# Patient Record
Sex: MALE | Race: WHITE | HISPANIC OR LATINO | Employment: FULL TIME | URBAN - METROPOLITAN AREA
[De-identification: names, ages, dates, MRNs, and addresses within clinical notes are randomized per-mention and may not be internally consistent; named-entity substitution may affect disease eponyms.]

---

## 2018-03-12 ENCOUNTER — HOSPITAL ENCOUNTER (OUTPATIENT)
Facility: HOSPITAL | Age: 33
Setting detail: OBSERVATION
Discharge: HOME | End: 2018-03-12
Attending: EMERGENCY MEDICINE | Admitting: INTERNAL MEDICINE
Payer: COMMERCIAL

## 2018-03-12 ENCOUNTER — APPOINTMENT (EMERGENCY)
Dept: RADIOLOGY | Facility: HOSPITAL | Age: 33
End: 2018-03-12
Payer: COMMERCIAL

## 2018-03-12 VITALS
HEART RATE: 80 BPM | RESPIRATION RATE: 19 BRPM | DIASTOLIC BLOOD PRESSURE: 60 MMHG | WEIGHT: 165 LBS | SYSTOLIC BLOOD PRESSURE: 115 MMHG | BODY MASS INDEX: 24.44 KG/M2 | TEMPERATURE: 98.2 F | HEIGHT: 69 IN | OXYGEN SATURATION: 98 %

## 2018-03-12 DIAGNOSIS — R07.9 CHEST PAIN: Primary | ICD-10-CM

## 2018-03-12 DIAGNOSIS — F19.10 DRUG ABUSE (HCC): ICD-10-CM

## 2018-03-12 DIAGNOSIS — D62 ACUTE BLOOD LOSS ANEMIA: ICD-10-CM

## 2018-03-12 DIAGNOSIS — K92.1 MELENA: ICD-10-CM

## 2018-03-12 DIAGNOSIS — R79.89 ELEVATED SERUM CREATININE: ICD-10-CM

## 2018-03-12 DIAGNOSIS — R06.02 SHORTNESS OF BREATH: ICD-10-CM

## 2018-03-12 DIAGNOSIS — R94.31 ABNORMAL EKG: ICD-10-CM

## 2018-03-12 PROBLEM — F15.10 METHAMPHETAMINE ABUSE (HCC): Status: ACTIVE | Noted: 2018-03-12

## 2018-03-12 PROBLEM — F11.10 HEROIN ABUSE (HCC): Status: ACTIVE | Noted: 2018-03-12

## 2018-03-12 PROBLEM — F41.9 ANXIETY: Status: ACTIVE | Noted: 2018-03-12

## 2018-03-12 PROBLEM — N17.9 ACUTE KIDNEY INJURY (HCC): Status: ACTIVE | Noted: 2018-03-12

## 2018-03-12 LAB
ALBUMIN SERPL BCP-MCNC: 4.4 G/DL (ref 3.5–5)
ALP SERPL-CCNC: 83 U/L (ref 46–116)
ALT SERPL W P-5'-P-CCNC: 39 U/L (ref 12–78)
AMPHETAMINES SERPL QL SCN: POSITIVE
ANION GAP SERPL CALCULATED.3IONS-SCNC: 7 MMOL/L (ref 4–13)
AST SERPL W P-5'-P-CCNC: 40 U/L (ref 5–45)
ATRIAL RATE: 93 BPM
ATRIAL RATE: 97 BPM
BARBITURATES UR QL: NEGATIVE
BASOPHILS # BLD AUTO: 0.02 THOUSANDS/ΜL (ref 0–0.1)
BASOPHILS NFR BLD AUTO: 0 % (ref 0–1)
BENZODIAZ UR QL: POSITIVE
BILIRUB SERPL-MCNC: 1.04 MG/DL (ref 0.2–1)
BUN SERPL-MCNC: 13 MG/DL (ref 5–25)
CALCIUM SERPL-MCNC: 10 MG/DL (ref 8.3–10.1)
CHLORIDE SERPL-SCNC: 97 MMOL/L (ref 100–108)
CHOLEST SERPL-MCNC: 205 MG/DL (ref 50–200)
CO2 SERPL-SCNC: 32 MMOL/L (ref 21–32)
COCAINE UR QL: POSITIVE
CREAT SERPL-MCNC: 1.31 MG/DL (ref 0.6–1.3)
EOSINOPHIL # BLD AUTO: 0.08 THOUSAND/ΜL (ref 0–0.61)
EOSINOPHIL NFR BLD AUTO: 1 % (ref 0–6)
ERYTHROCYTE [DISTWIDTH] IN BLOOD BY AUTOMATED COUNT: 12.5 % (ref 11.6–15.1)
EST. AVERAGE GLUCOSE BLD GHB EST-MCNC: 105 MG/DL
GFR SERPL CREATININE-BSD FRML MDRD: 72 ML/MIN/1.73SQ M
GLUCOSE SERPL-MCNC: 87 MG/DL (ref 65–140)
HBA1C MFR BLD: 5.3 % (ref 4.2–6.3)
HCT VFR BLD AUTO: 40.8 % (ref 36.5–49.3)
HDLC SERPL-MCNC: 37 MG/DL (ref 40–60)
HGB BLD-MCNC: 14.2 G/DL (ref 12–17)
LDLC SERPL CALC-MCNC: 142 MG/DL (ref 0–100)
LYMPHOCYTES # BLD AUTO: 1.38 THOUSANDS/ΜL (ref 0.6–4.47)
LYMPHOCYTES NFR BLD AUTO: 18 % (ref 14–44)
MCH RBC QN AUTO: 31.8 PG (ref 26.8–34.3)
MCHC RBC AUTO-ENTMCNC: 34.8 G/DL (ref 31.4–37.4)
MCV RBC AUTO: 91 FL (ref 82–98)
METHADONE UR QL: NEGATIVE
MONOCYTES # BLD AUTO: 0.82 THOUSAND/ΜL (ref 0.17–1.22)
MONOCYTES NFR BLD AUTO: 10 % (ref 4–12)
NEUTROPHILS # BLD AUTO: 5.57 THOUSANDS/ΜL (ref 1.85–7.62)
NEUTS SEG NFR BLD AUTO: 71 % (ref 43–75)
NRBC BLD AUTO-RTO: 0 /100 WBCS
OPIATES UR QL SCN: POSITIVE
P AXIS: 65 DEGREES
P AXIS: 69 DEGREES
PCP UR QL: NEGATIVE
PLATELET # BLD AUTO: 314 THOUSANDS/UL (ref 149–390)
PLATELET # BLD AUTO: 332 THOUSANDS/UL (ref 149–390)
PMV BLD AUTO: 8.8 FL (ref 8.9–12.7)
PMV BLD AUTO: 9.2 FL (ref 8.9–12.7)
POTASSIUM SERPL-SCNC: 4 MMOL/L (ref 3.5–5.3)
PR INTERVAL: 138 MS
PR INTERVAL: 146 MS
PROT SERPL-MCNC: 8.6 G/DL (ref 6.4–8.2)
QRS AXIS: 64 DEGREES
QRS AXIS: 72 DEGREES
QRSD INTERVAL: 92 MS
QRSD INTERVAL: 92 MS
QT INTERVAL: 322 MS
QT INTERVAL: 324 MS
QTC INTERVAL: 402 MS
QTC INTERVAL: 408 MS
RBC # BLD AUTO: 4.47 MILLION/UL (ref 3.88–5.62)
SODIUM SERPL-SCNC: 136 MMOL/L (ref 136–145)
T WAVE AXIS: -20 DEGREES
T WAVE AXIS: -3 DEGREES
THC UR QL: NEGATIVE
TRIGL SERPL-MCNC: 131 MG/DL
TROPONIN I SERPL-MCNC: <0.02 NG/ML
VENTRICULAR RATE: 93 BPM
VENTRICULAR RATE: 97 BPM
WBC # BLD AUTO: 7.89 THOUSAND/UL (ref 4.31–10.16)

## 2018-03-12 PROCEDURE — 83036 HEMOGLOBIN GLYCOSYLATED A1C: CPT | Performed by: INTERNAL MEDICINE

## 2018-03-12 PROCEDURE — 71046 X-RAY EXAM CHEST 2 VIEWS: CPT

## 2018-03-12 PROCEDURE — 93005 ELECTROCARDIOGRAM TRACING: CPT

## 2018-03-12 PROCEDURE — 99285 EMERGENCY DEPT VISIT HI MDM: CPT

## 2018-03-12 PROCEDURE — 80307 DRUG TEST PRSMV CHEM ANLYZR: CPT | Performed by: INTERNAL MEDICINE

## 2018-03-12 PROCEDURE — 80061 LIPID PANEL: CPT | Performed by: INTERNAL MEDICINE

## 2018-03-12 PROCEDURE — 93010 ELECTROCARDIOGRAM REPORT: CPT | Performed by: INTERNAL MEDICINE

## 2018-03-12 PROCEDURE — 85049 AUTOMATED PLATELET COUNT: CPT | Performed by: INTERNAL MEDICINE

## 2018-03-12 PROCEDURE — 80053 COMPREHEN METABOLIC PANEL: CPT | Performed by: EMERGENCY MEDICINE

## 2018-03-12 PROCEDURE — 84484 ASSAY OF TROPONIN QUANT: CPT | Performed by: INTERNAL MEDICINE

## 2018-03-12 PROCEDURE — 36415 COLL VENOUS BLD VENIPUNCTURE: CPT | Performed by: EMERGENCY MEDICINE

## 2018-03-12 PROCEDURE — 84484 ASSAY OF TROPONIN QUANT: CPT | Performed by: EMERGENCY MEDICINE

## 2018-03-12 PROCEDURE — 85025 COMPLETE CBC W/AUTO DIFF WBC: CPT | Performed by: EMERGENCY MEDICINE

## 2018-03-12 RX ORDER — CLONIDINE HYDROCHLORIDE 0.1 MG/1
0.1 TABLET ORAL DAILY
Qty: 10 TABLET | Refills: 0 | Status: SHIPPED | OUTPATIENT
Start: 2018-03-12 | End: 2018-03-12

## 2018-03-12 RX ORDER — LORAZEPAM 2 MG/ML
0.5 INJECTION INTRAMUSCULAR ONCE
Status: COMPLETED | OUTPATIENT
Start: 2018-03-12 | End: 2018-03-12

## 2018-03-12 RX ORDER — IBUPROFEN 400 MG/1
400 TABLET ORAL ONCE
Status: COMPLETED | OUTPATIENT
Start: 2018-03-12 | End: 2018-03-12

## 2018-03-12 RX ORDER — HEPARIN SODIUM 5000 [USP'U]/ML
5000 INJECTION, SOLUTION INTRAVENOUS; SUBCUTANEOUS EVERY 8 HOURS SCHEDULED
Status: DISCONTINUED | OUTPATIENT
Start: 2018-03-12 | End: 2018-03-12 | Stop reason: HOSPADM

## 2018-03-12 RX ORDER — CLONIDINE HYDROCHLORIDE 0.1 MG/1
0.1 TABLET ORAL EVERY 6 HOURS PRN
Status: DISCONTINUED | OUTPATIENT
Start: 2018-03-12 | End: 2018-03-12 | Stop reason: HOSPADM

## 2018-03-12 RX ORDER — ASPIRIN 81 MG/1
324 TABLET, CHEWABLE ORAL ONCE
Status: COMPLETED | OUTPATIENT
Start: 2018-03-12 | End: 2018-03-12

## 2018-03-12 RX ORDER — SODIUM CHLORIDE 9 MG/ML
100 INJECTION, SOLUTION INTRAVENOUS CONTINUOUS
Status: DISCONTINUED | OUTPATIENT
Start: 2018-03-12 | End: 2018-03-12

## 2018-03-12 RX ORDER — ASPIRIN 81 MG/1
324 TABLET, CHEWABLE ORAL ONCE
Status: DISCONTINUED | OUTPATIENT
Start: 2018-03-12 | End: 2018-03-12

## 2018-03-12 RX ORDER — CLONIDINE HYDROCHLORIDE 0.1 MG/1
0.1 TABLET ORAL DAILY
Qty: 7 TABLET | Refills: 0 | Status: SHIPPED | OUTPATIENT
Start: 2018-03-12 | End: 2018-03-19

## 2018-03-12 RX ORDER — LORAZEPAM 2 MG/ML
0.5 INJECTION INTRAMUSCULAR EVERY 8 HOURS PRN
Status: DISCONTINUED | OUTPATIENT
Start: 2018-03-12 | End: 2018-03-12 | Stop reason: HOSPADM

## 2018-03-12 RX ADMIN — CLONIDINE HYDROCHLORIDE 0.1 MG: 0.1 TABLET ORAL at 09:48

## 2018-03-12 RX ADMIN — SODIUM CHLORIDE 1000 ML: 0.9 INJECTION, SOLUTION INTRAVENOUS at 05:37

## 2018-03-12 RX ADMIN — LORAZEPAM 0.5 MG: 2 INJECTION INTRAMUSCULAR; INTRAVENOUS at 09:10

## 2018-03-12 RX ADMIN — ASPIRIN 81 MG 324 MG: 81 TABLET ORAL at 05:10

## 2018-03-12 RX ADMIN — IBUPROFEN 400 MG: 400 TABLET, FILM COATED ORAL at 12:00

## 2018-03-12 RX ADMIN — LORAZEPAM 0.5 MG: 2 INJECTION INTRAMUSCULAR; INTRAVENOUS at 06:39

## 2018-03-12 RX ADMIN — HEPARIN SODIUM 5000 UNITS: 5000 INJECTION, SOLUTION INTRAVENOUS; SUBCUTANEOUS at 09:10

## 2018-03-12 RX ADMIN — SODIUM CHLORIDE 100 ML/HR: 0.9 INJECTION, SOLUTION INTRAVENOUS at 09:13

## 2018-03-12 NOTE — DISCHARGE SUMMARY
[facility] Discharge Summary - Medical Lucie Lazo 33 y o  male MRN: [id number]    [address] [facility] [address] [facility] 7 Room / Bed: [facility] 731/[facility] 731-01 Encounter: [id number]    BRIEF OVERVIEW    Admitting Provider: Alma Erazo MD  Discharge Provider: Alma Erazo MD  Primary Care Physician at Discharge: none    Discharge To:  home    Admission Date: 3/12/2018     Discharge Date: 3/12/2018    Primary Discharge Diagnosis  Principal Problem:    Chest pain  Active Problems:    Acute kidney injury [facility])    Drug abuse    Heroin abuse    Anxiety    Methamphetamine abuse  Resolved Problems:    * No resolved hospital problems  *      Other Problems Addressed: none    Consulting Providers - none       Therapeutic Operative Procedures Performed - none    Diagnostic Procedures Performed - Chest x ray - no acute cardiopulmonary disease    Discharge Disposition: Home/Self Care  Discharged With Lines: no    Test Results Pending at Discharge: none    Outpatient Follow-Up - Patient was advised to schedule outpatient follow up with MARGARETH CHAVIS  [facility]  Follow up with consulting providers- none  Active Issues Requiring Follow-up - none    Code Status: Level 1 - Full Code     Medications   See after visit summary for reconciled discharge medications provided to patient and family      Your Medications   Your Medications     Morning Afternoon Evening Bedtime As Needed    al mag oxide-diphenhydramine-lidocaine viscous 1:1:1 suspension   Commonly known as: MAGIC MOUTHWASH   Swish and spit 10 mL every 6 (six) hours as needed for mouth pain or discomfort   Refills: 0           cloNIDine 0 1 mg tablet   Commonly known as: CATAPRES   Take 1 tablet (0 1 mg total) by mouth daily   Refills: 0   Next Dose Due: 3/13 8am               Allergies  Allergies   Allergen Reactions    Penicillins      Discharge Diet: regular diet  Activity restrictions: none    [address] Taasera is a 33 y o  male with history of heroin use who presented with chest pain  Patient reported using 3 bags of heroin including methamphetamines  Urine drug analysis was positive for heroin, cocaine and methamphetamines  He reports using marijuana infrequently, he used marijuana too  Patient was admitted for ACS rule out and HOST referral      Patient was admitted to medicine service  His problems were addressed as follows:    Chest pain - Patient has a history of heroin use with chest pain and shortness of breath beginning acutely this evening  EKG showed ST changes in inferior leads  All troponins came back negative  Patient was found to have elevated total cholesterol and LDL, A1C was normal         Acute kidney injury - Patient had a creatinine elevated to 1 31 with a BUN of 13  He was started on iv fluids hydration with good response      Heroin abuse - Patient reports using roughly 2-3 bags of heroin per day, denies any iv drug use  Case management referred patient for HOST program  Patient was prescribed clonidine 0 1mg daily for 7 days for withdrawal symptoms upon discharge  Methamphetamine abuse - Patient reports current methamphetamine use  Anxiety - Patient reports feelings of anxiety  Ativan 0 5mg prn for anxiety was ordered      Mouth/tongue pain - unknown etiology, normal physical exam  Patient was prescribed magic mouthwash upon discharge  Presenting Problem/History of Present Illness  Principal Problem:    Chest pain  Active Problems:    Acute kidney injury ([address] )    Drug abuse    Heroin abuse    Anxiety    Methamphetamine abuse  Resolved Problems:    * No resolved hospital problems  *      Other Pertinent Test Results     Discharge Condition: good    Discharge  Statement   I spent 30 minutes minutes discharging the patient  This time was spent on the day of discharge  I had direct contact with the patient on the day of discharge   Additional documentation is required if more than 30 minutes were spent on discharge  The care was coordinated with case management and nursing staff

## 2018-03-12 NOTE — SOCIAL WORK
Received a call from Dr Jaden Pike who states pt is medically stable for dc and requesting CM see to offer HOST  Met with pt to discuss the role of CM and to discuss any help pt may need prior to dc  Pt lives with his parents in a 3 story home with a 1st floor setup; 2 [address]  Pt performed ADL's indptly pta, no use of DME  No hx of C  Pt's pharmacy preference is [facility] [place]  CM offered HOST  Pt is agreeable to speaking with HOST  Pt is requesting HOST contact him at [phone]  HOST referral made  CM spoke to [facility] who states they're unable to visit pt tonight but they will contact pt via phone to conduct interview and discuss treatment  Dr Jaden Pike aware of same  CM was provided with scripts to check cost as pt does not have insurance coverage--Clonidine $5 58 and Magic Mouthwash $ 8 10 per Tony Mederos at [facility]; discount card used  Met with pt to discuss same  Pt is agreeable to cost and states she will fill scripts at his pharmacy as he does not have money with him  CM also provided pt with a discount card to take to his pharmacy  CM notified Dr Jaden Pike of same

## 2018-03-12 NOTE — ED NOTES
Pt admits to snorting 3bags of heroin daily and requests something if withdrawing in the hospital      Robert Candelaria, RN  03/12/18 0757

## 2018-03-12 NOTE — PLAN OF CARE
DISCHARGE PLANNING     Discharge to home or other facility with appropriate resources Progressing        Knowledge Deficit     Patient/family/caregiver demonstrates understanding of disease process, treatment plan, medications, and discharge instructions Progressing        SAFETY ADULT     Patient will remain free of falls Progressing     Maintain or return to baseline ADL function Progressing     Maintain or return mobility status to optimal level Progressing

## 2018-03-12 NOTE — PLAN OF CARE
DISCHARGE PLANNING     Discharge to home or other facility with appropriate resources Adequate for Discharge        Knowledge Deficit     Patient/family/caregiver demonstrates understanding of disease process, treatment plan, medications, and discharge instructions Adequate for Discharge        SAFETY ADULT     Patient will remain free of falls Adequate for Discharge     Maintain or return to baseline ADL function Adequate for Discharge     Maintain or return mobility status to optimal level Adequate for Discharge

## 2018-03-12 NOTE — PLAN OF CARE
Problem: DISCHARGE PLANNING - CARE MANAGEMENT  Goal: Discharge to post-acute care or home with appropriate resources  INTERVENTIONS:  - Conduct assessment to determine patient/family and health care team treatment goals, and need for post-acute services based on payer coverage, community resources, and patient preferences, and barriers to discharge  - Address psychosocial, clinical, and financial barriers to discharge as identified in assessment in conjunction with the patient/family and health care team  - Arrange appropriate level of post-acute services according to patient's   needs and preference and payer coverage in collaboration with the physician and health care team  - Communicate with and update the patient/family, physician, and health care team regarding progress on the discharge plan  - Arrange appropriate transportation to post-acute venues  - Plan for discharge to home    Outcome: Progressing

## 2018-03-12 NOTE — ED ATTENDING ATTESTATION
Hema Zheng MD, saw and evaluated the patient  I have discussed the patient with the resident/non-physician practitioner and agree with the resident's/non-physician practitioner's findings, Plan of Care, and MDM as documented in the resident's/non-physician practitioner's note, except where noted  All available labs and Radiology studies were reviewed  At this point I agree with the current assessment done in the Emergency Department  I have conducted an independent evaluation of this patient including a focused history of:    Emergency Department Note- Bob Chakraborty 33 y o  male MRN: [id number]    Unit/Bed#: [facility] 731-01 Encounter: [id number]    Bob Chakraborty is a 33 y o  male who presents with   Chief Complaint   Patient presents with    Chest Pain     pt c/o CP after using heroin  States he also used meth yesterday  Pt c/o SOB for EMS   Recreational Drug Use         History of Present Illness   HPI:  Bob Chakraborty is a 33 y o  male who presents for evaluation of:  Chest pain after snorting heroin earlier this evening  The patient also noted some dyspnea  The patient has no history of any cardiac disease  His chest pain has resolved prior to arrival in the emergency department  The patient denies associated fevers and chills  Review of Systems   Constitutional: Negative for fatigue and fever  Respiratory: Positive for chest tightness and shortness of breath  Cardiovascular: Positive for chest pain  Negative for palpitations  Neurological: Negative for weakness and headaches  All other systems reviewed and are negative  Historical Information   History reviewed  No pertinent past medical history  History reviewed  No pertinent surgical history    Social History   History   Alcohol Use    Yes     Comment: social     History   Drug Use    Types: Heroin, Methamphetamines     History   Smoking Status    Current Some Day Smoker   Smokeless Tobacco    Never Used     Family History: non-contributory    Meds/Allergies   all medications and allergies reviewed  Allergies   Allergen Reactions    Penicillins        Objective   First Vitals:   Blood Pressure: 146/90 (03/12/18 0406)  Pulse: (!) 107 (03/12/18 0406)  Temperature: (!) 97 4 °F (36 3 °C) (03/12/18 0406)  Temp Source: Tympanic (03/12/18 0406)  Respirations: 16 (03/12/18 0406)  Height: 5' 9" (175 3 cm) (03/12/18 0406)  Weight - Scale: 74 8 kg (165 lb) (03/12/18 0406)  SpO2: 97 % (03/12/18 0406)    Current Vitals:   Blood Pressure: 110/67 (03/12/18 1216)  Pulse: 73 (03/12/18 1216)  Temperature: 98 1 °F (36 7 °C) (03/12/18 1216)  Temp Source: Oral (03/12/18 1216)  Respirations: 18 (03/12/18 1216)  Height: 5' 9" (175 3 cm) (03/12/18 0406)  Weight - Scale: 74 8 kg (165 lb) (03/12/18 0406)  SpO2: 95 % (03/12/18 1216)      Intake/Output Summary (Last 24 hours) at 03/12/18 1439  Last data filed at 03/12/18 1219   Gross per 24 hour   Intake              120 ml   Output                0 ml   Net              120 ml       Invasive Devices     Peripheral Intravenous Line            Peripheral IV 03/12/18 Left;Upper Arm less than 1 day                Physical Exam   Constitutional: He is oriented to person, place, and time  He appears well-developed and well-nourished  HENT:   Head: Normocephalic and atraumatic  Eyes: Conjunctivae are normal  Pupils are equal, round, and reactive to light  Cardiovascular: Normal rate and regular rhythm  Pulmonary/Chest: Effort normal and breath sounds normal    The patient is noted to have multiple excoriations over his anterior chest wall   Abdominal: Soft  Bowel sounds are normal    Musculoskeletal: Normal range of motion  He exhibits no deformity  Neurological: He is alert and oriented to person, place, and time  Skin: Skin is warm and dry  Psychiatric: He has a normal mood and affect   His behavior is normal  Judgment and thought content normal    Nursing note and vitals reviewed  Medical Decision Makin  Acute chest pain:  Plan to obtain ECG and troponin to rule out acute coronary syndrome  To obtain chest x-ray to rule out pneumonia      Recent Results (from the past 36 hour(s))   ECG 12 lead    Collection Time: 18  4:26 AM   Result Value Ref Range    Ventricular Rate 97 BPM    Atrial Rate 97 BPM    GA Interval 146 ms    QRSD Interval 92 ms    QT Interval 322 ms    QTC Interval 408 ms    P Axis 65 degrees    QRS Axis 64 degrees    T Wave Axis -20 degrees   Comprehensive metabolic panel    Collection Time: 18  4:37 AM   Result Value Ref Range    Sodium 136 136 - 145 mmol/L    Potassium 4 0 3 5 - 5 3 mmol/L    Chloride 97 (L) 100 - 108 mmol/L    CO2 32 21 - 32 mmol/L    Anion Gap 7 4 - 13 mmol/L    BUN 13 5 - 25 mg/dL    Creatinine 1 31 (H) 0 60 - 1 30 mg/dL    Glucose 87 65 - 140 mg/dL    Calcium 10 0 8 3 - 10 1 mg/dL    AST 40 5 - 45 U/L    ALT 39 12 - 78 U/L    Alkaline Phosphatase 83 46 - 116 U/L    Total Protein 8 6 (H) 6 4 - 8 2 g/dL    Albumin 4 4 3 5 - 5 0 g/dL    Total Bilirubin 1 04 (H) 0 20 - 1 00 mg/dL    eGFR 72 ml/min/1 73sq m   CBC and differential    Collection Time: 18  4:37 AM   Result Value Ref Range    WBC 7 89 4 31 - 10 16 Thousand/uL    RBC 4 47 3 88 - 5 62 Million/uL    Hemoglobin 14 2 12 0 - 17 0 g/dL    Hematocrit 40 8 36 5 - 49 3 %    MCV 91 82 - 98 fL    MCH 31 8 26 8 - 34 3 pg    MCHC 34 8 31 4 - 37 4 g/dL    RDW 12 5 11 6 - 15 1 %    MPV 9 2 8 9 - 12 7 fL    Platelets 833 966 - 892 Thousands/uL    nRBC 0 /100 WBCs    Neutrophils Relative 71 43 - 75 %    Lymphocytes Relative 18 14 - 44 %    Monocytes Relative 10 4 - 12 %    Eosinophils Relative 1 0 - 6 %    Basophils Relative 0 0 - 1 %    Neutrophils Absolute 5 57 1 85 - 7 62 Thousands/µL    Lymphocytes Absolute 1 38 0 60 - 4 47 Thousands/µL    Monocytes Absolute 0 82 0 17 - 1 22 Thousand/µL    Eosinophils Absolute 0 08 0 00 - 0 61 Thousand/µL    Basophils Absolute 0 02 0 00 - 0 10 Thousands/µL   Troponin I    Collection Time: 03/12/18  4:37 AM   Result Value Ref Range    Troponin I <0 02 <=0 04 ng/mL   ECG 12 lead    Collection Time: 03/12/18  5:21 AM   Result Value Ref Range    Ventricular Rate 93 BPM    Atrial Rate 93 BPM    PA Interval 138 ms    QRSD Interval 92 ms    QT Interval 324 ms    QTC Interval 402 ms    P [place] 69 degrees    QRS Axis 72 degrees    T Wave Axis -3 degrees   Platelet count    Collection Time: 03/12/18  9:17 AM   Result Value Ref Range    Platelets 314 732 - 613 Thousands/uL    MPV 8 8 (L) 8 9 - 12 7 fL   Lipid Panel with Direct LDL reflex    Collection Time: 03/12/18  9:17 AM   Result Value Ref Range    Cholesterol 205 (H) 50 - 200 mg/dL    Triglycerides 131 <=150 mg/dL    HDL, Direct 37 (L) 40 - 60 mg/dL    LDL Calculated 142 (H) 0 - 100 mg/dL   Troponin I    Collection Time: 03/12/18  9:17 AM   Result Value Ref Range    Troponin I <0 02 <=0 04 ng/mL   Hemoglobin A1C w/ EAG Estimation    Collection Time: 03/12/18  9:17 AM   Result Value Ref Range    Hemoglobin A1C 5 3 4 2 - 6 3 %     mg/dl   Rapid drug screen, urine    Collection Time: 03/12/18  9:31 AM   Result Value Ref Range    Amph/Meth UR Positive (A) Negative    Barbiturate Ur Negative Negative    Benzodiazepine Urine Positive (A) Negative    Cocaine Urine Positive (A) Negative    Methadone Urine Negative Negative    Opiate Urine Positive (A) Negative    PCP Ur Negative Negative    THC Urine Negative Negative   Troponin I    Collection Time: 03/12/18 12:50 PM   Result Value Ref Range    Troponin I <0 02 <=0 04 ng/mL     XR chest 2 views   ED Interpretation   Interpreted by myself: no acute abn      Final Result      No acute cardiopulmonary disease  Workstation performed: [id number]               Portions of the record may have been created with voice recognition software   Occasional wrong word or "sound a like" substitutions may have occurred due to the inherent limitations of voice recognition software  Read the chart carefully and recognize, using context, where substitutions have occurred

## 2018-03-12 NOTE — H&P
H&P Exam - Hay Arauz 33 y o  male MRN: [id number]    Unit/Bed#: ED 09 Encounter: [id number]      Assessment/Plan     Assessment:    Chest pain  Patient has a history of heroin use with chest pain and shortness of breath beginning acutely this evening  Patient's 1st troponin was negative  His EKG was significant for inverted T-waves with no previous to compare  Chest x-ray was normal   There are no abnormalities in patient's electrolytes or CBC  Patient's heart score is 1   -trend troponins  -observation with telemetry monitoring  -lipid panel for risk stratification  -continue aspirin  -glucose was within normal limits will not order A1c  -urine drug screen    Heroin abuse  Patient reports using roughly 2-3 packs per day he denies any injection  Patient denies history of overdose he has never received Narcan before this   -clonidine patch for withdrawal symptoms  -Host referral  -cessation counseling    Acute kidney injury  Patient had a creatinine elevated to 1 31 with a BUN of 13  Denies any dysuria, hematuria  Patient does report dribbling postvoid  -urinalysis  -continue normal saline at 100 per hour    Anxiety  Patient reports feeling on culture upon anxious  Patient will receive 1 time dose of Ativan   -Ativan p r n  0 5 mg Q8    Methamphetamine abuse  Patient reports history of use  -HOST      History of Present Illness     HPI:  Hay Arauz is a 33 y o  male with history of heroin use who presents with chest pain  Patient reports using 2 bags of her when this morning and 1 bag this evening at roughly 11:00 p m  Ana Rm Patient developed chest pain after his evening heroin use  He describes this as a crushing chest pain which was 10/10 and nonradiating  Pain was located in the center of his chest that was not reproducible  He denies any recent trauma  This chest pain was accompanied by shortness of breath  Patient denied any nausea, vomiting, lightheadedness, blurry vision    Patient has never had an episode of this before  Patient did have some chest pain 1 night prior to admission but he was able to sleep through it  Patient uses roughly 2-3 bags of heroin per day, he denies injection  Patient also reports marijuana use yesterday reporting 2 hits  He reports using marijuana infrequently  Patient denies any significant past medical history  Does have a family history of diabetes  Patient had no other complaints besides decreased p o  intake for the last few days and a nosebleed which started this evening  The nosebleed has resolved  Patient does report dribbling after he voids which has been going on for years  He denies any dysuria, hematuria, frequency  Patient also reports anxiety  Patient denies any out alcohol use, he reports 1 cigarette per day smoking history for few months  Patient denies any SI or Hi  Patient also has a history of methamphetamine abuse  Emergency department patient's labs were significant for creatinine of 1 31, there is no baseline to compare this to  His troponin was negative  His EKG was significant for T-wave inversions there is no prior EKG to compare this to  Patient's chest x-ray was negative for pulmonary disease  Patient received Narcan by EMS as well as aspirin  Patient did not receive nitroglycerin  Patient received 1 L bolus in ED  Review of Systems   Constitutional: Positive for activity change, appetite change and fatigue  Negative for chills, diaphoresis, fever and unexpected weight change  Eyes: Negative for photophobia and visual disturbance  Respiratory: Positive for chest tightness and shortness of breath  Negative for apnea, cough, choking and wheezing  Cardiovascular: Positive for chest pain  Negative for palpitations and leg swelling  Gastrointestinal: Negative for abdominal distention, abdominal pain, blood in stool, constipation, diarrhea, nausea, rectal pain and vomiting     Genitourinary: Negative for difficulty urinating, dysuria and hematuria  Musculoskeletal: Negative for arthralgias, back pain, joint swelling and myalgias  Skin: Negative for color change  Neurological: Positive for weakness  Negative for dizziness, tremors, seizures, syncope, speech difficulty, light-headedness, numbness and headaches  Historical Information   History reviewed  No pertinent past medical history  History reviewed  No pertinent surgical history  Social History   History   Alcohol Use    Yes     Comment: social     History   Drug Use    Types: Heroin, Methamphetamines     History   Smoking Status    Current Some Day Smoker   Smokeless Tobacco    Never Used     Family History:   Family History   Problem Relation Age of Onset    Diabetes Brother     Heart disease Maternal Grandmother        Meds/Allergies   PTA meds:   None     Allergies   Allergen Reactions    Penicillins        Objective   Vitals: Blood pressure 114/65, pulse 96, temperature (!) 97 4 °F (36 3 °C), temperature source Tympanic, resp  rate 19, height 5' 9" (1 753 m), weight 74 8 kg (165 lb), SpO2 99 %  No intake or output data in the 24 hours ending 03/12/18 0624    Invasive Devices     Peripheral Intravenous Line            Peripheral IV 03/12/18 Left Antecubital less than 1 day                Physical Exam   Constitutional: He is oriented to person, place, and time  He appears well-developed and well-nourished  HENT:   Head: Normocephalic and atraumatic  Mouth/Throat: Oropharynx is clear and moist  No oropharyngeal exudate  Eyes: EOM are normal  Pupils are equal, round, and reactive to light  Right eye exhibits no discharge  Left eye exhibits no discharge  Right conjunctiva is injected  Left conjunctiva is injected  Neck: Normal range of motion  Neck supple  Cardiovascular: Normal rate, regular rhythm and normal heart sounds  Exam reveals no gallop and no friction rub  No murmur heard    Pulmonary/Chest: Breath sounds normal  No respiratory distress  He has no wheezes  He has no rales  He exhibits no tenderness  Abdominal: Soft  Bowel sounds are normal  He exhibits no distension  There is no tenderness  There is no rebound  Musculoskeletal: Normal range of motion  He exhibits no edema, tenderness or deformity  Neurological: He is alert and oriented to person, place, and time  He has normal strength  No cranial nerve deficit or sensory deficit  Skin: Skin is warm and dry  No rash noted  No erythema  Lab Results: I have personally reviewed pertinent reports  Imaging: I have personally reviewed pertinent reports  EKG, Pathology, and Other Studies: I have personally reviewed pertinent reports  Code Status: Level 1 - Full Code  Advance Directive and Living Will:      Power of :    POLST:      Counseling / Coordination of Care  Total floor / unit time spent today 30 minutes  Greater than 50% of total time was spent with the patient and / or family counseling and / or coordination of care  A description of the counseling / coordination of care: Dara Soulier

## 2018-03-12 NOTE — PROGRESS NOTES
[facility] Admission Note   Unit/Bed # @[facility]INK ([facility] [place])@ Encounter: [id number]  SOD Team A          Braden Parkinson 33 y o  male [id number]       Patient seen and examined  Reviewed H&P per Dr Tate Mediate  Agree with the assessment and plan as outlined in his H&P       Assessment/Plan:   Principal Problem:    Chest pain  Active Problems:    Acute kidney injury ([address] )    Drug abuse    Heroin abuse    Anxiety    Methamphetamine abuse         Disposition:  OBSERVATION    Expected LOS: <2 [address], DO  Internal Medicine, PGY-2

## 2018-03-12 NOTE — ED PROVIDER NOTES
History  Chief Complaint   Patient presents with    Chest Pain     pt c/o CP after using heroin  States he also used meth yesterday  Pt c/o SOB for EMS   Recreational Drug Use       80-year-old male presenting for evaluation of chest pain and shortness of breath  Patient reports that he snorted 1 bag of heroin around 11:00 p m  last night  He reports that he normally uses multiple bags of heroin daily  Patient reports that around 4:00 a m  this morning he had acute onset of chest pain and shortness of breath  He reports that he was talking on the phone with a friend at the onset of symptoms  He is unable to describe the chest pain, pain is localized to left side of his chest, does not radiate, no a/e factors  He describes the shortness of breath as difficulty catching his breath  Patient called EMS and symptoms gradually resolved without any intervention  Patient is not in any symptoms at this time  He has never had similar symptoms in the past   Patient denies any recent illness, fevers, chills, headache, cough, URI symptoms, abdominal pain, nausea vomiting, changes in stool  Current tobacco use  Denies alcohol use  Denies other drug use besides heroin  No significant family history for cardiac disease  A/P:  80-year-old male with CP/SOB, will get EKG to assess for ischemic changes/arrhythmia, CXR to rule out PTX/pulmonary edema, troponin to evaluate for ischemia, CBC to assess for leukocytosis/anemia, BMP to assess renal function/electrolytes            None       History reviewed  No pertinent past medical history  History reviewed  No pertinent surgical history  Family History   Problem Relation Age of Onset    Diabetes Brother     Heart disease Maternal Grandmother      I have reviewed and agree with the history as documented      Social History   Substance Use Topics    Smoking status: Current Some Day Smoker    Smokeless tobacco: Never Used    Alcohol use Yes      Comment: social Review of Systems   Constitutional: Negative for chills and fever  HENT: Positive for nosebleeds  Negative for ear pain, rhinorrhea and sore throat  Respiratory: Positive for shortness of breath  Negative for cough  Cardiovascular: Positive for chest pain  Negative for leg swelling  Gastrointestinal: Negative for abdominal pain, constipation, diarrhea, nausea and vomiting  Genitourinary: Negative for dysuria, frequency, hematuria and urgency  Musculoskeletal: Negative for back pain, myalgias and neck pain  Skin: Negative for color change and rash  Allergic/Immunologic: Negative for immunocompromised state  Neurological: Negative for dizziness, weakness, light-headedness, numbness and headaches  All other systems reviewed and are negative  Physical Exam  ED Triage Vitals [03/12/18 0406]   Temperature Pulse Respirations Blood Pressure SpO2   (!) 97 4 °F (36 3 °C) (!) 107 16 146/90 97 %      Temp Source Heart Rate Source Patient Position - Orthostatic VS BP Location FiO2 (%)   Tympanic Monitor Sitting Right arm --      Pain Score       5           Orthostatic Vital Signs  Vitals:    03/12/18 0939 03/12/18 1045 03/12/18 1216 03/12/18 1540   BP: 134/80 98/59 110/67 115/60   Pulse: 91 95 73 80   Patient Position - Orthostatic VS: Sitting  Lying Lying       Physical Exam   Constitutional: He is oriented to person, place, and time  He appears well-developed and well-nourished  HENT:   Head: Normocephalic and atraumatic  Mouth/Throat: Oropharynx is clear and moist    Eyes: Conjunctivae and EOM are normal    Neck: Normal range of motion  Neck supple  Cardiovascular: Normal rate, regular rhythm, normal heart sounds and intact distal pulses  Pulmonary/Chest: Effort normal and breath sounds normal  No respiratory distress  He has no wheezes  He has no rales  He exhibits no tenderness  Abdominal: Soft  He exhibits no distension  There is no tenderness  There is no rebound and no guarding  Musculoskeletal: He exhibits no edema or deformity  Neurological: He is alert and oriented to person, place, and time  He exhibits normal muscle tone  Coordination normal    Skin: Skin is warm and dry  No rash noted  Psychiatric: He has a normal mood and affect  Thought content normal    Nursing note and vitals reviewed  ED Medications  Medications   aspirin chewable tablet 324 mg (324 mg Oral Given 3/12/18 0510)   sodium chloride 0 9 % bolus 1,000 mL (0 mL Intravenous Stopped 3/12/18 0715)   LORazepam (ATIVAN) 2 mg/mL injection 0 5 mg (0 5 mg Intravenous Given 3/12/18 0639)   ibuprofen (MOTRIN) tablet 400 mg (400 mg Oral Given 3/12/18 1200)       Diagnostic Studies  Results Reviewed     Procedure Component Value Units Date/Time    Troponin I [[id number]]  (Normal) Collected:  03/12/18 1250    Lab Status:  Final result Specimen:  Blood from Arm, Right Updated:  03/12/18 1338     Troponin I <0 02 ng/mL     Narrative:         Siemens Chemistry analyzer 99% cutoff is > 0 04 ng/mL in network labs    o cTnI 99% cutoff is useful only when applied to patients in the clinical setting of myocardial ischemia  o cTnI 99% cutoff should be interpreted in the context of clinical history, ECG findings and possibly cardiac imaging to establish correct diagnosis  o cTnI 99% cutoff may be suggestive but clearly not indicative of a coronary event without the clinical setting of myocardial ischemia  Rapid drug screen, urine [[id number]]  (Abnormal) Collected:  03/12/18 0931    Lab Status:  Final result Specimen:  Urine from Urine, Other Updated:  03/12/18 1005     Amph/Meth UR Positive (A)     Barbiturate Ur Negative     Benzodiazepine Urine Positive (A)     Cocaine Urine Positive (A)     Methadone Urine Negative     Opiate Urine Positive (A)     PCP Ur Negative     THC Urine Negative    Narrative:         Presumptive report  If requested, specimen will be sent to reference lab for confirmation  FOR MEDICAL PURPOSES ONLY  IF CONFIRMATION NEEDED PLEASE CONTACT THE LAB WITHIN 5 DAYS  Drug Screen Cutoff Levels:  AMPHETAMINE/METHAMPHETAMINES  1000 ng/mL  BARBITURATES     200 ng/mL  BENZODIAZEPINES     200 ng/mL  COCAINE      300 ng/mL  METHADONE      300 ng/mL  OPIATES      300 ng/mL  PHENCYCLIDINE     25 ng/mL  THC       50 ng/mL    Hemoglobin A1C w/ EAG Estimation [[id number]]  (Normal) Collected:  03/12/18 0917    Lab Status:  Final result Specimen:  Blood from Arm, Left Updated:  03/12/18 0953     Hemoglobin A1C 5 3 %       mg/dl     Lipid Panel with Direct LDL reflex [[id number]]  (Abnormal) Collected:  03/12/18 0917    Lab Status:  Final result Specimen:  Blood from Arm, Left Updated:  03/12/18 0947     Cholesterol 205 (H) mg/dL      Triglycerides 131 mg/dL      HDL, Direct 37 (L) mg/dL      LDL Calculated 142 (H) mg/dL     Narrative:         Triglyceride:        Normal               <150 mg/dl        Borderline High     150-199 mg/dl        High               200-499 mg/dl        Very High           >499 mg/dl      Troponin I [[id number]]  (Normal) Collected:  03/12/18 0917    Lab Status:  Final result Specimen:  Blood from Arm, Left Updated:  03/12/18 0947     Troponin I <0 02 ng/mL     Narrative:         Siemens Chemistry analyzer 99% cutoff is > 0 04 ng/mL in network labs    o cTnI 99% cutoff is useful only when applied to patients in the clinical setting of myocardial ischemia  o cTnI 99% cutoff should be interpreted in the context of clinical history, ECG findings and possibly cardiac imaging to establish correct diagnosis  o cTnI 99% cutoff may be suggestive but clearly not indicative of a coronary event without the clinical setting of myocardial ischemia      Platelet count [[id number]]  (Abnormal) Collected:  03/12/18 0917    Lab Status:  Final result Specimen:  Blood from Arm, Left Updated:  03/12/18 0931     Platelets 097 Thousands/uL      MPV 8 8 (L) fL     Comprehensive metabolic panel [[id number]]  (Abnormal) Collected:  03/12/18 0437    Lab Status:  Final result Specimen:  Blood from Arm, Left Updated:  03/12/18 0514     Sodium 136 mmol/L      Potassium 4 0 mmol/L      Chloride 97 (L) mmol/L      CO2 32 mmol/L      Anion Gap 7 mmol/L      BUN 13 mg/dL      Creatinine 1 31 (H) mg/dL      Glucose 87 mg/dL      Calcium 10 0 mg/dL      AST 40 U/L      ALT 39 U/L      Alkaline Phosphatase 83 U/L      Total Protein 8 6 (H) g/dL      Albumin 4 4 g/dL      Total Bilirubin 1 04 (H) mg/dL      eGFR 72 ml/min/1 73sq m     Narrative:         National Kidney Disease Education Program recommendations are as follows:  GFR calculation is accurate only with a steady state creatinine  Chronic Kidney disease less than 60 ml/min/1 73 sq  meters  Kidney failure less than 15 ml/min/1 73 sq  meters  Troponin I [[id number]]  (Normal) Collected:  03/12/18 0437    Lab Status:  Final result Specimen:  Blood from Arm, Left Updated:  03/12/18 0512     Troponin I <0 02 ng/mL     Narrative:         Siemens Chemistry analyzer 99% cutoff is > 0 04 ng/mL in network labs    o cTnI 99% cutoff is useful only when applied to patients in the clinical setting of myocardial ischemia  o cTnI 99% cutoff should be interpreted in the context of clinical history, ECG findings and possibly cardiac imaging to establish correct diagnosis  o cTnI 99% cutoff may be suggestive but clearly not indicative of a coronary event without the clinical setting of myocardial ischemia      CBC and differential [[id number]]  (Normal) Collected:  03/12/18 0437    Lab Status:  Final result Specimen:  Blood from Arm, Left Updated:  03/12/18 0453     WBC 7 89 Thousand/uL      RBC 4 47 Million/uL      Hemoglobin 14 2 g/dL      Hematocrit 40 8 %      MCV 91 fL      MCH 31 8 pg      MCHC 34 8 g/dL      RDW 12 5 %      MPV 9 2 fL      Platelets 291 Thousands/uL      nRBC 0 /100 WBCs      Neutrophils Relative 71 %      Lymphocytes Relative 18 %      Monocytes Relative 10 %      Eosinophils Relative 1 %      Basophils Relative 0 %      Neutrophils Absolute 5 57 Thousands/µL      Lymphocytes Absolute 1 38 Thousands/µL      Monocytes Absolute 0 82 Thousand/µL      Eosinophils Absolute 0 08 Thousand/µL      Basophils Absolute 0 02 Thousands/µL                  XR chest 2 views   ED Interpretation by Kimber Morales DO (03/12 1253)   Interpreted by myself: no acute abn      Final Result by Myrna Graham MD (03/12 1345)      No acute cardiopulmonary disease  Workstation performed: [id number]               Procedures  ECG 12 Lead Documentation  Date/Time: 3/12/2018 4:55 AM  Performed by: Fouzia Marc  Authorized by: Giuseppe CHILDS     Indications / Diagnosis:  CP  ECG reviewed by me, the ED Provider: yes    Patient location:  ED  Previous ECG:     Previous ECG:  Unavailable  Rate:     ECG rate:  97  Rhythm:     Rhythm: sinus rhythm    Ectopy:     Ectopy: none    QRS:     QRS axis:  Normal  Conduction:     Conduction: normal    ST segments:     ST segments:  Non-specific    Depression:  AVF  T waves:     T waves: inverted      Inverted:  III and aVF          Phone Consults  ED Phone Contact    ED Course  ED Course as of Mar 13 0139   Mon Mar 12, 2018   0513 Paged SOD for admission    0516 No previous Creatinine: (!) 1 31   0523 Patient having return of CP, repeat EKG: NSR @ 93   T wave inversions III, avF, no change from previous                                MDM  Number of Diagnoses or Management Options  Abnormal EKG:   Acute blood loss anemia:   Chest pain:   Drug abuse:   Elevated serum creatinine:   Melena:   Shortness of breath:   Diagnosis management comments: 34 yo M with CP/SOB, found to have EKG changes and elevated creatinine, admitted to [facility] for further workup/management       Amount and/or Complexity of Data Reviewed  Clinical lab tests: ordered and reviewed  Tests in the radiology section of CPT®: ordered and reviewed  Tests in the medicine section of CPT®: ordered and reviewed      CritCare Time    Disposition  Final diagnoses:   Chest pain   Shortness of breath   Abnormal EKG   Elevated serum creatinine   Melena   Acute blood loss anemia   Drug abuse     Time reflects when diagnosis was documented in both MDM as applicable and the Disposition within this note     Time User Action Codes Description Comment    3/12/2018  5:22 AM Lurena Fray A Add [R07 9] Chest pain     3/12/2018  5:22 AM Lurena Fray A Add [R06 02] Shortness of breath     3/12/2018  5:23 AM Lurena Fray A Add [R94 31] Abnormal EKG     3/12/2018  5:23 AM Lurena Fray A Add [R79 89] Elevated serum creatinine     3/12/2018  1:50 PM Cephus Lat Add [K92 1] Melena     3/12/2018  1:50 PM Cephus Lat Add [D62] Acute blood loss anemia     3/12/2018  3:41 PM Cephus Lat Add [F19 10] Drug abuse       ED Disposition     ED Disposition Condition Comment    Admit  Case was discussed with SOD and the patient's admission status was agreed to be Admission Status: observation status to the service of Dr Arjun Ballard   Follow-up Information     Follow up With Specialties Details Why Contact Info    Robert Gooden PA-C Internal Medicine, Physician Assistant Schedule an appointment as soon as possible for a visit in 1 week(s)  [address] [place], [place] [address] [facility]  [phone]          Discharge Medication List as of 3/12/2018  4:00 PM      START taking these medications    Details   al mag oxide-diphenhydramine-lidocaine viscous (MAGIC MOUTHWASH) 1:1:1 suspension Swish and spit 10 mL every 6 (six) hours as needed for mouth pain or discomfort, Starting Mon 3/12/2018, Print         CONTINUE these medications which have CHANGED    Details   cloNIDine (CATAPRES) 0 1 mg tablet Take 1 tablet (0 1 mg total) by mouth daily, Starting Mon 3/12/2018, Print           No discharge procedures on file  ED Provider  Attending physically available and evaluated Tyrellleland Taborkenneth ALLEN managed the patient along with the ED Attending      Electronically Signed by         Jo-Ann Francis DO  03/13/18 8812

## 2018-03-19 ENCOUNTER — HOSPITAL ENCOUNTER (OUTPATIENT)
Facility: HOSPITAL | Age: 33
Setting detail: OBSERVATION
Discharge: HOME | End: 2018-03-19
Attending: INTERNAL MEDICINE | Admitting: INTERNAL MEDICINE
Payer: COMMERCIAL

## 2018-03-19 ENCOUNTER — APPOINTMENT (EMERGENCY)
Dept: RADIOLOGY | Facility: HOSPITAL | Age: 33
End: 2018-03-19
Payer: COMMERCIAL

## 2018-03-19 ENCOUNTER — HOSPITAL ENCOUNTER (OUTPATIENT)
Facility: HOSPITAL | Age: 33
Setting detail: OBSERVATION
Discharge: LEFT BEFORE DISCHARGE | End: 2018-03-19
Attending: EMERGENCY MEDICINE | Admitting: INTERNAL MEDICINE
Payer: COMMERCIAL

## 2018-03-19 VITALS
WEIGHT: 170 LBS | TEMPERATURE: 96.5 F | OXYGEN SATURATION: 97 % | DIASTOLIC BLOOD PRESSURE: 75 MMHG | RESPIRATION RATE: 20 BRPM | SYSTOLIC BLOOD PRESSURE: 138 MMHG | HEART RATE: 84 BPM | BODY MASS INDEX: 25.18 KG/M2 | HEIGHT: 69 IN

## 2018-03-19 VITALS
HEART RATE: 73 BPM | BODY MASS INDEX: 25.18 KG/M2 | WEIGHT: 170 LBS | SYSTOLIC BLOOD PRESSURE: 125 MMHG | HEIGHT: 69 IN | TEMPERATURE: 97.6 F | RESPIRATION RATE: 16 BRPM | DIASTOLIC BLOOD PRESSURE: 60 MMHG | OXYGEN SATURATION: 97 %

## 2018-03-19 DIAGNOSIS — R07.9 CHEST PAIN: Primary | ICD-10-CM

## 2018-03-19 DIAGNOSIS — F19.10 DRUG ABUSE (HCC): ICD-10-CM

## 2018-03-19 PROBLEM — E87.1 HYPONATREMIA: Status: ACTIVE | Noted: 2018-03-19

## 2018-03-19 PROBLEM — F17.200 TOBACCO DEPENDENCE: Chronic | Status: ACTIVE | Noted: 2018-03-19

## 2018-03-19 LAB
ALBUMIN SERPL BCP-MCNC: 4.4 G/DL (ref 3.5–5)
ALP SERPL-CCNC: 86 U/L (ref 46–116)
ALT SERPL W P-5'-P-CCNC: 32 U/L (ref 12–78)
ANION GAP SERPL CALCULATED.3IONS-SCNC: 4 MMOL/L (ref 4–13)
AST SERPL W P-5'-P-CCNC: 28 U/L (ref 5–45)
ATRIAL RATE: 72 BPM
ATRIAL RATE: 81 BPM
BASOPHILS # BLD AUTO: 0.02 THOUSANDS/ΜL (ref 0–0.1)
BASOPHILS NFR BLD AUTO: 0 % (ref 0–1)
BILIRUB SERPL-MCNC: 0.84 MG/DL (ref 0.2–1)
BUN SERPL-MCNC: 11 MG/DL (ref 5–25)
CALCIUM SERPL-MCNC: 9.6 MG/DL (ref 8.3–10.1)
CHLORIDE SERPL-SCNC: 99 MMOL/L (ref 100–108)
CO2 SERPL-SCNC: 32 MMOL/L (ref 21–32)
CREAT SERPL-MCNC: 1.05 MG/DL (ref 0.6–1.3)
EOSINOPHIL # BLD AUTO: 0.04 THOUSAND/ΜL (ref 0–0.61)
EOSINOPHIL NFR BLD AUTO: 0 % (ref 0–6)
ERYTHROCYTE [DISTWIDTH] IN BLOOD BY AUTOMATED COUNT: 12.2 % (ref 11.6–15.1)
GFR SERPL CREATININE-BSD FRML MDRD: 94 ML/MIN/1.73SQ M
GLUCOSE SERPL-MCNC: 105 MG/DL (ref 65–140)
HCT VFR BLD AUTO: 38.6 % (ref 36.5–49.3)
HGB BLD-MCNC: 13.5 G/DL (ref 12–17)
HIV 1+2 AB+HIV1 P24 AG SERPL QL IA: NORMAL
HIV1 P24 AG SER QL: NORMAL
LYMPHOCYTES # BLD AUTO: 0.81 THOUSANDS/ΜL (ref 0.6–4.47)
LYMPHOCYTES NFR BLD AUTO: 8 % (ref 14–44)
MCH RBC QN AUTO: 31.3 PG (ref 26.8–34.3)
MCHC RBC AUTO-ENTMCNC: 35 G/DL (ref 31.4–37.4)
MCV RBC AUTO: 89 FL (ref 82–98)
MONOCYTES # BLD AUTO: 0.65 THOUSAND/ΜL (ref 0.17–1.22)
MONOCYTES NFR BLD AUTO: 7 % (ref 4–12)
NEUTROPHILS # BLD AUTO: 8.15 THOUSANDS/ΜL (ref 1.85–7.62)
NEUTS SEG NFR BLD AUTO: 85 % (ref 43–75)
NRBC BLD AUTO-RTO: 0 /100 WBCS
P AXIS: 72 DEGREES
P AXIS: 74 DEGREES
PLATELET # BLD AUTO: 331 THOUSANDS/UL (ref 149–390)
PMV BLD AUTO: 8.9 FL (ref 8.9–12.7)
POTASSIUM SERPL-SCNC: 3.5 MMOL/L (ref 3.5–5.3)
PR INTERVAL: 136 MS
PR INTERVAL: 144 MS
PROT SERPL-MCNC: 8.1 G/DL (ref 6.4–8.2)
QRS AXIS: 52 DEGREES
QRS AXIS: 56 DEGREES
QRSD INTERVAL: 92 MS
QRSD INTERVAL: 94 MS
QT INTERVAL: 348 MS
QT INTERVAL: 374 MS
QTC INTERVAL: 404 MS
QTC INTERVAL: 409 MS
RBC # BLD AUTO: 4.32 MILLION/UL (ref 3.88–5.62)
SODIUM SERPL-SCNC: 135 MMOL/L (ref 136–145)
T WAVE AXIS: -14 DEGREES
T WAVE AXIS: 1 DEGREES
TROPONIN I SERPL-MCNC: <0.02 NG/ML
VENTRICULAR RATE: 72 BPM
VENTRICULAR RATE: 81 BPM
WBC # BLD AUTO: 9.69 THOUSAND/UL (ref 4.31–10.16)

## 2018-03-19 PROCEDURE — 87806 HIV AG W/HIV1&2 ANTB W/OPTIC: CPT | Performed by: INTERNAL MEDICINE

## 2018-03-19 PROCEDURE — 84484 ASSAY OF TROPONIN QUANT: CPT | Performed by: EMERGENCY MEDICINE

## 2018-03-19 PROCEDURE — 93010 ELECTROCARDIOGRAM REPORT: CPT | Performed by: INTERNAL MEDICINE

## 2018-03-19 PROCEDURE — 80053 COMPREHEN METABOLIC PANEL: CPT | Performed by: EMERGENCY MEDICINE

## 2018-03-19 PROCEDURE — 99235 HOSP IP/OBS SAME DATE MOD 70: CPT | Performed by: STUDENT IN AN ORGANIZED HEALTH CARE EDUCATION/TRAINING PROGRAM

## 2018-03-19 PROCEDURE — 71046 X-RAY EXAM CHEST 2 VIEWS: CPT

## 2018-03-19 PROCEDURE — 99285 EMERGENCY DEPT VISIT HI MDM: CPT

## 2018-03-19 PROCEDURE — 86704 HEP B CORE ANTIBODY TOTAL: CPT | Performed by: INTERNAL MEDICINE

## 2018-03-19 PROCEDURE — 93005 ELECTROCARDIOGRAM TRACING: CPT

## 2018-03-19 PROCEDURE — 86803 HEPATITIS C AB TEST: CPT | Performed by: INTERNAL MEDICINE

## 2018-03-19 PROCEDURE — 86705 HEP B CORE ANTIBODY IGM: CPT | Performed by: INTERNAL MEDICINE

## 2018-03-19 PROCEDURE — 85025 COMPLETE CBC W/AUTO DIFF WBC: CPT | Performed by: EMERGENCY MEDICINE

## 2018-03-19 PROCEDURE — 36415 COLL VENOUS BLD VENIPUNCTURE: CPT | Performed by: EMERGENCY MEDICINE

## 2018-03-19 PROCEDURE — 84484 ASSAY OF TROPONIN QUANT: CPT | Performed by: INTERNAL MEDICINE

## 2018-03-19 PROCEDURE — 99236 HOSP IP/OBS SAME DATE HI 85: CPT | Performed by: INTERNAL MEDICINE

## 2018-03-19 PROCEDURE — 87340 HEPATITIS B SURFACE AG IA: CPT | Performed by: INTERNAL MEDICINE

## 2018-03-19 RX ORDER — CLONIDINE HYDROCHLORIDE 0.1 MG/1
0.1 TABLET ORAL DAILY
Status: DISCONTINUED | OUTPATIENT
Start: 2018-03-19 | End: 2018-03-19

## 2018-03-19 RX ORDER — ACETAMINOPHEN 325 MG/1
650 TABLET ORAL ONCE
Status: COMPLETED | OUTPATIENT
Start: 2018-03-19 | End: 2018-03-19

## 2018-03-19 RX ORDER — ASPIRIN 81 MG/1
324 TABLET, CHEWABLE ORAL ONCE
Status: COMPLETED | OUTPATIENT
Start: 2018-03-19 | End: 2018-03-19

## 2018-03-19 RX ORDER — POTASSIUM CHLORIDE 20 MEQ/1
40 TABLET, EXTENDED RELEASE ORAL ONCE
Status: COMPLETED | OUTPATIENT
Start: 2018-03-19 | End: 2018-03-19

## 2018-03-19 RX ORDER — ACETAMINOPHEN 325 MG/1
650 TABLET ORAL EVERY 6 HOURS PRN
Status: DISCONTINUED | OUTPATIENT
Start: 2018-03-19 | End: 2018-03-19 | Stop reason: HOSPADM

## 2018-03-19 RX ORDER — CLONIDINE HYDROCHLORIDE 0.1 MG/1
0.1 TABLET ORAL DAILY
Qty: 10 TABLET | Refills: 0 | Status: SHIPPED | OUTPATIENT
Start: 2018-03-20 | End: 2019-04-03

## 2018-03-19 RX ORDER — ASPIRIN 81 MG/1
324 TABLET, CHEWABLE ORAL ONCE
Status: DISCONTINUED | OUTPATIENT
Start: 2018-03-19 | End: 2018-03-19

## 2018-03-19 RX ORDER — POTASSIUM CHLORIDE 20 MEQ/1
40 TABLET, EXTENDED RELEASE ORAL ONCE
Status: DISCONTINUED | OUTPATIENT
Start: 2018-03-19 | End: 2018-03-19 | Stop reason: HOSPADM

## 2018-03-19 RX ORDER — CLONIDINE HYDROCHLORIDE 0.1 MG/1
0.1 TABLET ORAL DAILY
Status: DISCONTINUED | OUTPATIENT
Start: 2018-03-19 | End: 2018-03-19 | Stop reason: HOSPADM

## 2018-03-19 RX ADMIN — POTASSIUM CHLORIDE 40 MEQ: 1500 TABLET, EXTENDED RELEASE ORAL at 07:09

## 2018-03-19 RX ADMIN — ASPIRIN 81 MG 324 MG: 81 TABLET ORAL at 04:16

## 2018-03-19 RX ADMIN — ACETAMINOPHEN 650 MG: 325 TABLET, FILM COATED ORAL at 06:29

## 2018-03-19 RX ADMIN — CLONIDINE HYDROCHLORIDE 0.1 MG: 0.1 TABLET ORAL at 08:42

## 2018-03-19 NOTE — SOCIAL WORK
CM met with patient and explained cm role  Pt alert and oriented  Pt reports he lives in 3 story home w/his parents w/2 jane  Pt denies DME, VNA, and rehab  Pt reports being independent PTA, reports support from family, he does not drive, and will transport home via public transportation or a ride if he can obtain one from friends  Pts pharmacy is [facility] in [place]  Pt reports recent (last night) hx of drugs/etoh  CM offered pt Host and patient refused, stating he doesn't need it and they won't help  CM reviewed d/c planning process including the following: identifying help at home, patient preference for d/c planning needs, Discharge Lounge, [facility] Meds to [facility] program, availability of treatment team to discuss questions or concerns patient and/or family may have regarding understanding medications and recognizing signs and symptoms once discharged  CM also encouraged patient to follow up with all recommended appointments after discharge  Patient advised of importance for patient and family to participate in managing patients medical well being

## 2018-03-19 NOTE — H&P
Pt has returned to the ED with chest pain  Pt recently left against medical advice at 5: 15 am  Please see H&P from previous encounter for more details

## 2018-03-19 NOTE — DISCHARGE SUMMARY
[facility] Discharge Summary - Medical Vikas Form 33 y o  male MRN: [id number]    [address] [facility]  Room / Bed: CRB/CRB Encounter: [id number]    BRIEF OVERVIEW    Admitting Provider: Rhiannon Isaac MD  Discharge Provider: Frederick Pritchard   Primary Care Physician at Discharge: Dr Lis Mares     Discharge To: Home     Admission Date: 3/19/2018     Discharge Date: 3/19/2018  5:19 AM    Primary Discharge Diagnosis  Principal Problem:    Chest pain  Active Problems:    Drug abuse    Heroin abuse    Anxiety    Methamphetamine abuse    Hyponatremia    Tobacco dependence  Resolved Problems:    * No resolved hospital problems  *      Other Problems Addressed: none     Consulting Providers: none       Therapeutic Operative Procedures Performed: none     Diagnostic Procedures Performed: none     Discharge Disposition: Left against medical advice or discontinued care  Discharged With Lines: no    Test Results Pending at Discharge: None     Outpatient Follow-Up  Please establish care at [facility]  Follow up with consulting providers: none   Active Issues Requiring Follow-up   Polysubstance abuse     Code Status: Level 1 - Full Code  Advance Directive and Living Will: <no information>  Power of :    POLST:      Medications   Clonidine     Allergies  Allergies   Allergen Reactions    Penicillins      Discharge Diet: regular diet  Activity restrictions: none    [address] [facility] [place] [facility]  This is a 20-year-old male with polysubstance dependence who was recently discharged from the hospital for chest pain  Patient reported back to the ED today around with complaint of chest pain  Patient reports that the chest pain started around 1:00 a m  this morning, he states the last time he used methamphetamine was 10 hours ago, he states that he has not used heroin since his prior admission    Patient reports that he was feeling anxiety and has chest pain in his left upper pac, nonradiating, continuous, without any associated symptoms like shortness of breath, diaphoresis, nausea, vomiting  He reports that he does get anxious at times, he reported being compliant with the medications he was discharged with  Patient reports that he has not gotten in touch with the host program but he is planning on making an appointment this morning  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  Patient reported that he was feeling anxious and that he would like something for anxiety, however on exam patient was not in distress, looked calm, heart rate was in 80s, did not appear anxious  It was explained to the patient that he is already getting clonidine and that he can get another medication called hydroxyzine, pt asked if he could get ativan since that is what he got last time and I explained to the patient that unfortunately right now I could not give him ativan for the anxiety  After this conversation pt reported that he would like to leave, I explained to the patient that he is already admitted to the hospital and he would be leaving against medical advice  I discussed with the patient that he did have some changes on his EKG and that it is safer for him if he stays for now and leave later in the morning after the work up is done but he reported that he still wanted to leave  AMA form was printed and risks including but not limited to death, sepsis, infection, arrhthymias, syncope, seizure were discussed, MARION CHAN was present at bedside as well  Pt reported that he will take his chances and signed the form  IV was removed before discharge  Dr Chris Hyatt and Dr Pal Orellana aware of this  Presenting Problem/History of Present Illness  Principal Problem:    Chest pain  Active Problems:    Drug abuse    Heroin abuse    Anxiety    Methamphetamine abuse    Hyponatremia    Tobacco dependence  Resolved Problems:    * No resolved hospital problems   *      Other Pertinent Test Results: none     Discharge Condition: stable    Discharge  Statement   I spent 30 minutes minutes discharging the patient  This time was spent on the day of discharge  I had direct contact with the patient on the day of discharge  Additional documentation is required if more than 30 minutes were spent on discharge

## 2018-03-19 NOTE — ED NOTES
Pt reported "I was in the hospital for a heroin Od, and was discharged about 3 days ago, I have had chest pains for a while now, and anxiety makes it worse, and rest makes it better"  Pt denied any use/abuse of drugs for the past 3 days     Imelda Hameed RN  03/19/18 4943

## 2018-03-19 NOTE — DISCHARGE SUMMARY
[facility] Discharge Summary - Medical Catherine Milian 33 y o  male MRN: [id number]    [address] [facility] Room / Bed: [place] [address]/[facility] 213-02 Encounter: [id number]    BRIEF OVERVIEW    Admitting Provider: Dara Singh MD  Discharge Provider: Dara Singh MD  Primary Care Physician at [address] [facility]    Admission Date: 3/19/2018     Discharge Date: 3/19/2018    [address] [place] [address] Ange Jimenez is a 33years old male who uses multiple recreational drugs and was recently admitted for similar presentation about a week ago  Patient states today he used methamphetamines about 10 hours prior to presentation and tonight had difficulty sleeping due to anxiety a persistent left-sided chest pain  Patient was admitted for ACS rule out of chest pain  Patient's EKG showed some nonspecific changes - flattening of T waves in leads V2,3, all troponins were negative  Telemetry monitoring without any abnormalities  Patient was admitted to Medicine service  His problems were addressed as follows:    1  Chest pain  - left-sided chest pain described as pressure without radiation and is associated with shortness of breath and anxiety  Patient states this is similar to pain he presented with 1 week ago but is actually less severe this time  All troponins negative, EKG with flattening of T-waves in leads V2 and V3, telemetry monitoring  Etiology of patient's chest pain is due to amphetamine use  Patient was advised on amphetamine cessation  2  Anxiety - patient states he has been anxious might since using meth and has been unable to sleep  Patient was started on clonidine 0 1mg daily for withdrawal symptoms  3  Methamphetamine use - patient states that the above-described symptoms only occurred once before that being during his prior the presentation 1 week ago which followed methamphetamine use  Counseling regarding drug cessation was provided      4  Maculopapular rash - unknown etiology, most likely self induced by scratching 2/2 to patient's anxiety  Patient with maculopapular rash with significant areas of excoriation across his chest        Presenting Problem/History of Present Illness  Principal Problem:    Chest pain  Active Problems:    Drug abuse    Heroin abuse    Anxiety    Methamphetamine abuse    Hyponatremia    Tobacco dependence  Resolved Problems:    * No resolved hospital problems  *      Diagnostic Procedures Performed  Imaging Studies: Chest x ray - no active cardiopulmonary disease    Therapeutic Procedures Performed - none    Test Results Pending at Discharge: - none     Medications     Medication List to be Continued at Discharge  Current Discharge Medication List        Current Discharge Medication List        Current Discharge Medication List      STOP taking these medications       cloNIDine (CATAPRES) 0 1 mg tablet Comments:   Reason for Stopping:               Allergies  Allergies   Allergen Reactions    Penicillins      Discharge Diet: regular diet  Activity restrictions: none  Discharge Condition: good  Discharged With Lines: no    Discharge Disposition: home    Outpatient Follow-Up - Patient was advised to schedule follow up appointment with [address] [place] [address]  Code Status: Level 1 - Full Code    Discharge  Statement   I spent 45 minutes minutes discharging the patient  This time was spent on the day of discharge  I had direct contact with the patient on the day of discharge  Additional documentation is required if more than 30 minutes were spent on discharge  Care was coordinated with nursing staff and case management

## 2018-03-19 NOTE — Clinical Note
Case was discussed with sod and the patient's admission status was agreed to be Admission Status: observation status to the service of Dr Krishna Brooks

## 2018-03-19 NOTE — CASE MANAGEMENT
Initial Clinical Review    Admission: Date/Time/Statement: 03/19/2018 @ 0616  Orders Placed This Encounter   Procedures    Place in Observation     Standing Status:   Standing     Number of Occurrences:   1     Order Specific Question:   Admitting Physician     Answer:   EVIE ROBLEDO [[id number]]     Order Specific Question:   Level of Care     Answer:   Med Surg [[id number]]     ED: Date/Time/Mode of Arrival:   ED Arrival Information     Expected Arrival Acuity Means of Arrival Escorted By Service Admission Type    - 3/19/2018 05:54 Urgent Walk-In Family Member General Medicine Urgent    Arrival Complaint    Chest Pain      Pt has returned to the ED with chest pain  Pt recently left against medical advice at 5: 15 am  Please see H&P from previous encounter for more details  Chief Complaint:   Chief Complaint   Patient presents with    Chest Pain     Pt just left [facility]a after being admitted for chest pain  Pt returned with chest pain and not feeling well  History of Illness:   Michael Casanova is a 33 y o  male who uses multiple recreational drugs and was recently admitted for similar presentation to today a week ago  Patient states today he used methamphetamines about 10 hours prior to presentation and tonight had difficulty sleeping due to anxiety a persistent left-sided chest pain  He says that the chest pain is a pressure, without radiation, and with the associated symptom of shortness of breath  Otherwise he denies abdominal pain, nausea, vomiting, or diarrhea  He says the symptoms of anxiety with chest pain and shortness of breath are similar to what he felt prior to his last presentation a week ago, but actually less severe today  He admits to using methamphetamines earlier today and states this is only the 2nd time he has used them, the 1st time being just prior to his admission a week ago for similar symptoms    He also has an erythematous maculopapular rash with eruptions in areas of excoriation across his chest   He says that this 1st appeared approximately 1 month ago, suddenly, and is nonpainful and not itchy  He is unsure exactly as to how, Y, or when it started  He says that at times the rub shins have bled  He notes no similar lesions elsewhere on his body  He cannot think of any triggers such as contacts, or other new detergents or washes  He states that he does not use injection drugs       ED Vital Signs:   ED Triage Vitals   Temperature Pulse Respirations Blood Pressure SpO2   18 0559 18 0559 18 0559 18 0559 18 0559   (!) 96 5 °F (35 8 °C) 90 18 136/73 99 %      Temp Source Heart Rate Source Patient Position - Orthostatic VS BP Location FiO2 (%)   18 0559 18 0721 18 0644 18 0644 --   Tympanic Monitor Sitting Right arm       Pain Score       18 0559       Worst Possible Pain        Wt Readings from Last 1 Encounters:   18 77 1 kg (170 lb)     Abnormal Labs/Diagnostic Test Results:   WBC Thousand/uL 9 69   RBC Million/uL 4 32   HEMOGLOBIN g/dL 13 5   HEMATOCRIT % 38 6     SODIUM mmol/L 135*   POTASSIUM mmol/L 3 5   CHLORIDE mmol/L 99*   CO2 mmol/L 32   ANION GAP mmol/L 4   BUN mg/dL 11   CREATININE mg/dL 1 05   GLUCOSE RANDOM mg/dL 105   CALCIUM mg/dL 9 6   AST U/L 28   ALT U/L 32   ALK PHOS U/L 86   TOTAL PROTEIN g/dL 8 1   BILIRUBIN TOTAL mg/dL 0 84   EGFR ml/min/1 73sq m 94     TROPONIN I ng/mL <0 02     Troponin I <0 02 ng/mL       Chest X:  No acute cardiopulmonary disease  EC, NSR, inverted T waved V2, V3    ED Treatment:   Medication Administration from 2018 0554 to 2018 6404       Date/Time Order Dose Route Action Action by Comments     2018 0629 acetaminophen (TYLENOL) tablet 650 mg 650 mg Oral Given Denise Marcus RN           Past Medical/Surgical History:    Active Ambulatory Problems     Diagnosis Date Noted    Acute kidney injury ([address] ) 2018    Chest pain 2018    Drug abuse 2018  Heroin abuse 03/12/2018    Anxiety 03/12/2018    Methamphetamine abuse 03/12/2018    Melena 03/12/2018    Acute blood loss anemia 03/12/2018    Hyponatremia 03/19/2018    Tobacco dependence 03/19/2018     No Additional Past Medical History       Admitting Diagnosis: Chest pain [R07 9]    Age/Sex: 33 y o  male    Assessment/Plan:   1  Chest pain              - left-sided chest pain described as pressure without radiation and is associated with shortness of breath and anxiety  Patient states this is similar to pain he presented with 1 week ago but is actually less severe this time  - initial troponin negative, will trend              - EKG with flattening of T-waves in leads V2 and V3, otherwise unremarkable              - aspirin              - will observe, follow troponins, repeat EKG  2  Anxiety              - patient states he has been anxious might since using meth and has been unable to sleep  - will give clonidine  3  Methamphetamine use              - patient states that the above-described symptoms only occurred once before that being during his prior the presentation 1 week ago which followed methamphetamine use  These are the only 2 times patient states he has ever used methamphetamines  He states that his friends use them regularly and do not have these symptoms so he does not understand why it happens to him              - encouraged patient to discontinue methamphetamine use as otherwise these events and hospital visits are likely to continue  4   Maculopapular rash              - patient with maculopapular rash with significant areas of excoriation across his chest   He states it appeared 1 month ago but does not hurt or itch, although appearance suggests he has been scratching it frequently              - unclear etiology, may be related to patient's multiple recreational drug use     VTE Pharmacologic Prophylaxis: Sequential compression device (Venodyne)    VTE Mechanical Prophylaxis: sequential compression device  Admission Status: OBSERVATION     Admission Orders:  Scheduled Meds:   Current Facility-Administered Medications:  acetaminophen 650 mg Oral Q6H PRN   cloNIDine 0 1 mg Oral Daily   nicotine 1 patch Transdermal Daily     Urine drug screen  TEL[facility]

## 2018-03-19 NOTE — ED NOTES
Pt  Decided to leave AMA  PIV was removed and pt  Discussed the risks with the admitting physician        Carissa Richards, MARION  03/19/18 8468

## 2018-03-19 NOTE — ED ATTENDING ATTESTATION
IBob DO, saw and evaluated the patient  I have discussed the patient with the resident/non-physician practitioner and agree with the resident's/non-physician practitioner's findings, Plan of Care, and MDM as documented in the resident's/non-physician practitioner's note, except where noted  All available labs and Radiology studies were reviewed  At this point I agree with the current assessment done in the Emergency Department  I have conducted an independent evaluation of this patient a history and physical is as follows:    21-year-old male presents with chest pain  Patient states it is left-sided chest pressure associated with shortness of breath and sweating at times  Was recently admitted for chest pain  Admits to using heroin but not recently  Used meth yesterday  On exam-no acute distress, heart regular, lungs clear, abdomen soft nontender, no lower extremity edema, no calf tenderness  Erythematous maculopapular rash on his chest with some areas of excoriation    Plan-cardiac evaluation, EKG shows T-wave inversion and flattening compared to old so will require admission    Critical Care Time  CritCare Time    Procedures

## 2018-03-19 NOTE — ED PROVIDER NOTES
History  Chief Complaint   Patient presents with    Chest Pain     Pt just left [facility] after being admitted for chest pain  Pt returned with chest pain and not feeling well  HPI  This is a 81-year-old male that presents today with chest pain  Patient was just evaluated for chest pain and admitted to SOD service for ACS rule out  Patient decided to leave AMA because he was not receiving any Ativan  While waiting in the waiting room patient decided to sign himself in because he was having chest pain again  He does understand he will not be receiving any benzos  Patient denies any new symptoms  Denies any new trauma  A repeat EKG was done which showed no change  Will admit patient obs  None       History reviewed  No pertinent past medical history  History reviewed  No pertinent surgical history  Family History   Problem Relation Age of Onset    Diabetes Brother     Heart disease Maternal Grandmother      I have reviewed and agree with the history as documented      Social History   Substance Use Topics    Smoking status: Current Some Day Smoker    Smokeless tobacco: Never Used    Alcohol use No      Comment: social        Review of Systems  REVIEW OF SYSTEMS  Constitutional:  Denies fever or chills   Eyes:  Denies change in visual acuity   HENT:  Denies nasal congestion or sore throat   Respiratory:  Denies cough or shortness of breath   Cardiovascular:  +chest pain  GI:  Denies abdominal pain, nausea, vomiting, bloody stools or diarrhea   :  Denies dysuria   Musculoskeletal:  Denies back pain or joint pain   Integument:  Denies rash   Neurologic:  Denies headache, focal weakness or sensory changes   Endocrine:  Denies polyuria or polydipsia   Lymphatic:  Denies swollen glands   Psychiatric:  Denies depression or anxiety     Physical Exam  ED Triage Vitals [03/19/18 0559]   Temperature Pulse Respirations Blood Pressure SpO2   (!) 96 5 °F (35 8 °C) 90 18 136/73 99 %      Temp Source Heart Rate Source Patient Position - Orthostatic VS BP Location FiO2 (%)   Tympanic -- -- -- --      Pain Score       Worst Possible Pain           Orthostatic Vital Signs  Vitals:    03/19/18 0559   BP: 136/73   Pulse: 90       Physical Exam  PHYSICAL EXAM    Constitutional:  Well developed, well nourished, no acute distress, non-toxic appearance    HEENT:  Atraumatic, PERRL, conjunctiva normal  Oropharynx moist, no pharyngeal exudates  Neck- normal range of motion, no tenderness, supple   Respiratory:  No respiratory distress, normal breath sounds, no rales, no wheezing   Cardiovascular:  Normal rate, normal rhythm, no murmurs, no gallops, no rubs   GI:  Soft, nondistended, normal bowel sounds, nontender, no organomegaly, no mass, no rebound, no guarding   :  No costovertebral angle tenderness   Musculoskeletal:  No edema, no tenderness, no deformities  Back- no tenderness  Integument:  Well hydrated, no rash   Lymphatic:  No lymphadenopathy noted   Neurologic:  Alert & oriented x 3, CN 2-12 normal, normal motor function, normal sensory function, no focal deficits noted   Psychiatric:  Speech and behavior appropriate     ED Medications  Medications   cloNIDine (CATAPRES) tablet 0 1 mg (not administered)   nicotine (NICODERM CQ) 7 mg/24hr TD 24 hr patch 1 patch (not administered)   acetaminophen (TYLENOL) tablet 650 mg (not administered)   potassium chloride (K-DUR,KLOR-CON) CR tablet 40 mEq (not administered)   acetaminophen (TYLENOL) tablet 650 mg (650 mg Oral Given 3/19/18 0629)       Diagnostic Studies  Results Reviewed     Procedure Component Value Units Date/Time    Chronic Hepatitis Panel [[id number]] Collected:  03/19/18 7129    Lab Status:   In process Specimen:  Blood from Arm, Right Updated:  03/19/18 0632    Troponin I [[id number]] Collected:  03/19/18 0627    Lab Status:  No result Specimen:  Blood from Arm, Right     Rapid HIV 1/2 AB-AG Combo [[id number]] Collected:  03/19/18 0608    Lab Status:  No result Specimen: Blood from Arm, Right     Rapid drug screen, urine [[id number]]     Lab Status:  No result Specimen:  Urine                  No orders to display         Procedures  Procedures      Phone Consults  ED Phone Contact    ED Course  ED Course                                MDM  CritCare Time    Disposition  Final diagnoses:   Chest pain     Time reflects when diagnosis was documented in both MDM as applicable and the Disposition within this note     Time User Action Codes Description Comment    3/19/2018  6:14 AM Laurence Cleveland U  8  [R07 9] Chest pain       ED Disposition     ED Disposition Condition Comment    Admit  Case was discussed with tawnya and the patient's admission status was agreed to be Admission Status: observation status to the service of Dr [facility]rb[facility]   Follow-up Information    None       Patient's Medications   Discharge Prescriptions    No medications on file     No discharge procedures on file  ED Provider  Attending physically available and evaluated Catherine Milian I managed the patient along with the ED Attending      Electronically Signed by         Yola Gann MD  03/21/18 7214

## 2018-03-19 NOTE — DISCHARGE INSTRUCTIONS
Amphetamine/Dextroamphetamine (By mouth)   Treats ADHD  Also treats narcolepsy  Brand Name(s): Adderall, Adderall XR   There may be other brand names for this medicine  When This Medicine Should Not Be Used: This medicine is not right for everyone  Do not use it if you had an allergic reaction to amphetamine, dextroamphetamine, or similar medicines, or if you have glaucoma, an overactive thyroid, or a history of drug abuse  How to Use This Medicine:   Long Acting Capsule, Tablet  · Take your medicine as directed  Your dose may need to be changed several times to find what works best for you  · Extended-release capsule:   ¨ Take the capsule in the morning  You may have trouble falling asleep at night if you take it in the afternoon or evening  ¨ Swallow the capsule whole  Do not crush, break, or chew it  ¨ If you cannot swallow the capsule, you may open it and sprinkle the contents over a spoonful of applesauce  Swallow the mixture right away without chewing  · Tablet: Take the tablet in the morning and early afternoon  You may have trouble falling asleep if you take it at night  · This medicine should come with a Medication Guide  Ask your pharmacist for a copy if you do not have one  · Missed dose: Take a dose as soon as you remember  If it is almost time for your next dose, wait until then and take a regular dose  Do not take extra medicine to make up for a missed dose  · Store the medicine in a closed container at room temperature, away from heat, moisture, and direct light  Drugs and Foods to Avoid:   Ask your doctor or pharmacist before using any other medicine, including over-the-counter medicines, vitamins, and herbal products  · Do not use this medicine if you are using or you have used an MAOI within the past 14 days  · Some foods and medicines can affect how this medicine works   Tell your doctor if you are using any of the following:   ¨ Acetazolamide, ammonium chloride, antacids, buspirone, chlorpromazine, ethosuximide, fentanyl, glutamic acid, guanethidine, haloperidol, hydrochlorothiazide, lithium, meperidine, methenamine, omeprazole, phenobarbital, phenytoin, propoxyphene, quinidine, reserpine, ritonavir, sodium acid phosphate, sodium bicarbonate, Ivana's wort, tramadol, or tryptophan supplement  ¨ Allergy medicine  ¨ Blood pressure medicine  ¨ Medicine to treat depression (including desipramine, fluoxetine, paroxetine, protriptyline)  ¨ Triptan medicine to treat migraine headache  · Fruit juice and vitamin C can affect how your body absorbs this medicine  Warnings While Using This Medicine:   · Tell your doctor if you are pregnant or breastfeeding, or if you have heart or blood vessel disease (such as arteriosclerosis), heart rhythm problems, high blood pressure, a history of heart attack, stroke, or seizures, or Tourette syndrome  Tell your doctor if you or anyone in your family has a history of depression, mental health problems, or drug or alcohol addiction  · This medicine may cause the following problems:   ¨ Serious heart or blood vessel problems, including heart attack and stroke  ¨ Unusual changes in behavior or thoughts  ¨ Peripheral vasculopathy (a blood circulation problem)  ¨ Delayed growth in children  ¨ Serotonin syndrome (when used with certain medicines)  · This medicine can be habit-forming  Do not use more than your prescribed dose  Call your doctor if you think your medicine is not working  · This medicine may make you dizzy or cause blurred vision  Do not drive or do anything that could be dangerous until you know how this medicine affects you  · Tell any doctor or dentist who treats you that you are using this medicine  This medicine may affect certain medical test results  · Keep all medicine out of the reach of children  Never share your medicine with anyone    Possible Side Effects While Using This Medicine:   Call your doctor right away if you notice any of these side effects:  · Allergic reaction: Itching or hives, swelling in your face or hands, swelling or tingling in your mouth or throat, chest tightness, trouble breathing  · Anxiety, fever, sweating, muscle spasms, twitching, nausea, vomiting, diarrhea, seeing or hearing things that are not there  · Blurred vision or vision changes  · Chest pain, trouble breathing, fainting  · Extreme energy or restlessness, confusion, agitation, unusual behavior  · Fast, pounding, or uneven heartbeat  · Numb, cold, pale, or painful fingers or toes, unexplained wounds on fingers or toes  · Seeing, hearing, or feeling things that are not there  · Seizures  If you notice these less serious side effects, talk with your doctor:   · Dry mouth, stomach pain  · Headache, dizziness  · Loss of appetite, weight loss  · Trouble sleeping  If you notice other side effects that you think are caused by this medicine, tell your doctor  Call your doctor for medical advice about side effects  You may report side effects to FDA at 3-774-FDA-2762  © 2017 [employer]  Information is for End User's use only and may not be sold, redistributed or otherwise used for commercial purposes  The above information is an  only  It is not intended as medical advice for individual conditions or treatments  Talk to your doctor, nurse or pharmacist before following any medical regimen to see if it is safe and effective for you

## 2018-03-19 NOTE — ED NOTES
ECG completed at 0250  Viewed and signed by Dr Berny Carranza, copy on chart       Nena Willis  03/19/18 0257

## 2018-03-19 NOTE — H&P
INTERNAL MEDICINE HISTORY AND PHYSICAL  CRB SOD Team A    NAME: Jaison Oliveros  AGE: 33 y o  SEX: male  : 1985   MRN: [id number]  ENCOUNTER: [id number]    DATE: 3/19/2018  TIME: 4:20 AM    Primary Care Physician: No primary care provider on file  Admitting Provider: Mae Sue MD    Chief complaint:  Anxiety    History of Present Illness     Meme Vora is a 33 y o  male who uses multiple recreational drugs and was recently admitted for similar presentation to today a week ago  Patient states today he used methamphetamines about 10 hours prior to presentation and tonight had difficulty sleeping due to anxiety a persistent left-sided chest pain  He says that the chest pain is a pressure, without radiation, and with the associated symptom of shortness of breath  Otherwise he denies abdominal pain, nausea, vomiting, or diarrhea  He says the symptoms of anxiety with chest pain and shortness of breath are similar to what he felt prior to his last presentation a week ago, but actually less severe today  He admits to using methamphetamines earlier today and states this is only the 2nd time he has used them, the 1st time being just prior to his admission a week ago for similar symptoms  He also has an erythematous maculopapular rash with eruptions in areas of excoriation across his chest   He says that this 1st appeared approximately 1 month ago, suddenly, and is nonpainful and not itchy  He is unsure exactly as to how, Y, or when it started  He says that at times the rub shins have bled  He notes no similar lesions elsewhere on his body  He cannot think of any triggers such as contacts, or other new detergents or washes  He states that he does not use injection drugs  Review of Systems   Review of Systems   Constitutional: Negative  HENT: Negative  Respiratory: Positive for shortness of breath  Cardiovascular: Positive for chest pain  Gastrointestinal: Negative  Genitourinary: Negative  Musculoskeletal: Negative  Skin:        Rash on his chest which appeared approximately 1 month ago  It is not painful or pruritic  Neurological: Negative  Psychiatric/Behavioral: The patient is nervous/anxious  All other systems reviewed and are negative  Past Medical History   No past medical history on file  Past Surgical History   No past surgical history on file  Social History     History   Alcohol Use No     Comment: social     History   Drug Use    Types: Methamphetamines     Comment: Pt states he recently quit heroin, used meth a few hours ago  History   Smoking Status    Current Some Day Smoker   Smokeless Tobacco    Never Used       Family History     Family History   Problem Relation Age of Onset    Diabetes Brother     Heart disease Maternal Grandmother        Medications Prior to Admission     Prior to Admission medications    Medication Sig Start Date End Date Taking? Authorizing Provider   leslie Vance oxide-diphenhydramine-lidocaine viscous (MAGIC MOUTHWASH) 1:1:1 suspension Swish and spit 10 mL every 6 (six) hours as needed for mouth pain or discomfort 3/12/18   Elijah Kowlaski MD   cloNIDine (CATAPRES) 0 1 mg tablet Take 1 tablet (0 1 mg total) by mouth daily 3/12/18   Elijah Kowalski MD       Allergies     Allergies   Allergen Reactions    Penicillins        Objective     Vitals:    03/19/18 0221 03/19/18 0223 03/19/18 0330   BP:  142/68 138/75   BP Location:   Left arm   Pulse: 75  84   Resp: 16  20   Temp: (!) 96 5 °F (35 8 °C)     TempSrc: Tympanic     SpO2: 98%  97%   Weight: 77 1 kg (170 lb)     Height: 5' 9" (1 753 m)       Body mass index is 25 1 kg/m²  No intake or output data in the 24 hours ending 03/19/18 0420  Invasive Devices     Peripheral Intravenous Line            Peripheral IV 03/19/18 Right Antecubital less than 1 day                Physical Exam  GENERAL: Appears well-developed and well-nourished    Appears in no acute distress   HEENT: Normocephalic and atraumatic  CARDIOVASCULAR: S1 and S2 are present  Regular rate and rhythm  No murmurs, rubs, or gallops  RESPIRATORY: CTA B/L, no rales, rhonci or wheezes  Normal respiratory expansion  ABDOMINAL: Bowel sounds present in all 4 quadrants, non-tender, soft, non-distended  No organomegaly, rebound, or guarding  EXTREMITIES: 2+ DP and PT pulses bilaterally; no cyanosis, clubbing, edema  ROM intact  SALAS x4   MUSCULOSKELETAL: No joint tenderness, deformity or swelling, full range of motion without pain  NEUROLOGIC: Patient is awake and alert  He answers questions and follows commands appropriately  He moves all extremities spontaneously  SKIN: Maculopapular chest rash with excoriation   PSYCHIATRIC: Anxious     Lab Results: I have personally reviewed pertinent reports      CBC:   Results from last 7 days  Lab Units 03/19/18  0317   WBC Thousand/uL 9 69   RBC Million/uL 4 32   HEMOGLOBIN g/dL 13 5   HEMATOCRIT % 38 6   MCV fL 89   MCH pg 31 3   MCHC g/dL 35 0   RDW % 12 2   MPV fL 8 9   PLATELETS Thousands/uL 331   NRBC AUTO /100 WBCs 0   NEUTROS PCT % 85*   LYMPHS PCT % 8*   MONOS PCT % 7   EOS PCT % 0   BASOS PCT % 0   NEUTROS ABS Thousands/µL 8 15*   LYMPHS ABS Thousands/µL 0 81   MONOS ABS Thousand/µL 0 65   EOS ABS Thousand/µL 0 04   , Chemistry Profile:   Results from last 7 days  Lab Units 03/19/18  0317   SODIUM mmol/L 135*   POTASSIUM mmol/L 3 5   CHLORIDE mmol/L 99*   CO2 mmol/L 32   ANION GAP mmol/L 4   BUN mg/dL 11   CREATININE mg/dL 1 05   GLUCOSE RANDOM mg/dL 105   CALCIUM mg/dL 9 6   AST U/L 28   ALT U/L 32   ALK PHOS U/L 86   TOTAL PROTEIN g/dL 8 1   BILIRUBIN TOTAL mg/dL 0 84   EGFR ml/min/1 73sq m 94   , Cardiac Studies:   Results from last 7 days  Lab Units 03/19/18  0317   TROPONIN I ng/mL <0 02       Imaging: I have personally reviewed pertinent films in PACS  Xr Chest 2 Views    Result Date: 3/12/2018  Narrative: CHEST INDICATION:   CP   Shortness of breath COMPARISON:  01/28/2007 EXAM PERFORMED/VIEWS:  XR CHEST PA & LATERAL  The frontal view was performed utilizing dual energy radiographic technique  Images: 4 FINDINGS: Cardiomediastinal silhouette appears unremarkable  The pulmonary vessels are normal  The lungs are clear  No pneumothorax or pleural effusion  Osseous structures appear within normal limits for patient age  Impression: No acute cardiopulmonary disease  Workstation performed: [id number]       Microbiology: cultures obtained in emergency department include none  Urinalysis:       Invalid input(s): URIBILINOGEN     Urine Micro:        EKG, Pathology, and Other Studies: I have personally reviewed pertinent reports  Medications given in Emergency Department     Medication Administration - last 24 hours from 03/18/2018 0420 to 03/19/2018 0420       Date/Time Order Dose Route Action Action by     03/19/2018 0416 aspirin chewable tablet 324 mg 324 mg Oral Given Goran Golden RN          Assessment and Plan   1  Chest pain   - left-sided chest pain described as pressure without radiation and is associated with shortness of breath and anxiety  Patient states this is similar to pain he presented with 1 week ago but is actually less severe this time  - initial troponin negative, will trend   - EKG with flattening of T-waves in leads V2 and V3, otherwise unremarkable   - aspirin   - will observe, follow troponins, repeat EKG  2  Anxiety   - patient states he has been anxious might since using meth and has been unable to sleep  - will give clonidine  3  Methamphetamine use   - patient states that the above-described symptoms only occurred once before that being during his prior the presentation 1 week ago which followed methamphetamine use  These are the only 2 times patient states he has ever used methamphetamines    He states that his friends use them regularly and do not have these symptoms so he does not understand why it happens to him   - encouraged patient to discontinue methamphetamine use as otherwise these events and hospital visits are likely to continue  4  Maculopapular rash   - patient with maculopapular rash with significant areas of excoriation across his chest   He states it appeared 1 month ago but does not hurt or itch, although appearance suggests he has been scratching it frequently   - unclear etiology, may be related to patient's multiple recreational drug use    Code Status: Level 1 - Full Code  VTE Pharmacologic Prophylaxis: Sequential compression device (Venodyne)    VTE Mechanical Prophylaxis: sequential compression device  Admission Status: OBSERVATION    Admission Time  I spent 1 hour admitting the patient  This involved direct patient contact where I performed a full history and physical, reviewing previous records, and reviewing laboratory and other diagnostic studies      Mariely Bustillos MD  Internal Medicine  PGY-1

## 2018-03-19 NOTE — ED ATTENDING ATTESTATION
I, [address], DO, saw and evaluated the patient  I have discussed the patient with the resident/non-physician practitioner and agree with the resident's/non-physician practitioner's findings, Plan of Care, and MDM as documented in the resident's/non-physician practitioner's note, except where noted  All available labs and Radiology studies were reviewed  At this point I agree with the current assessment done in the Emergency Department  I have conducted an independent evaluation of this patient a history and physical is as follows:    58-year-old male presents with chest pain  Patient just left the emergency department against medical advice returned secondary to continuing pain  Patient states he is willing to stay at this time  On exam-no acute distress, heart regular, no respiratory distress    Plan-EKG, consult SOD medicine for admission    Critical Care Time  CritCare Time    Procedures

## 2018-03-19 NOTE — ED PROVIDER NOTES
History  Chief Complaint   Patient presents with    Chest Pain     chest pain and trouble breathing for a couple days     This is a 78-year-old male that presents today with chest pain  Patient states today he has been having some chest pressure along with some shortness of breath  States he also has some anxiety  Patient was recently admitted here for chest pain which he had severe troponins and EKGs and was sent home  Patient states he took some methamphetamines yesterday  Denies any heroin use  Denies any cocaine years  Denies any radiation of the pain  No nausea vomiting  No fevers or chills  No chest trauma  78-year-old male that presents today with chest pain  EKG shows some flipped T-waves in V2 and V3    will admit patient for ACS workup  None       No past medical history on file  No past surgical history on file  Family History   Problem Relation Age of Onset    Diabetes Brother     Heart disease Maternal Grandmother      I have reviewed and agree with the history as documented  Social History   Substance Use Topics    Smoking status: Current Some Day Smoker    Smokeless tobacco: Never Used    Alcohol use No      Comment: social        Review of Systems   Constitutional: Negative  Negative for diaphoresis and fever  HENT: Negative  Respiratory: Positive for shortness of breath  Negative for cough and wheezing  Cardiovascular: Positive for chest pain  Negative for palpitations and leg swelling  Gastrointestinal: Negative for abdominal distention, abdominal pain, nausea and vomiting  Genitourinary: Negative  Musculoskeletal: Negative  Skin: Negative  Neurological: Negative  Psychiatric/Behavioral: Negative  All other systems reviewed and are negative        Physical Exam  ED Triage Vitals   Temperature Pulse Respirations Blood Pressure SpO2   03/19/18 0221 03/19/18 0221 03/19/18 0221 03/19/18 0223 03/19/18 0221   (!) 96 5 °F (35 8 °C) 75 16 142/68 98 %      Temp Source Heart Rate Source Patient Position - Orthostatic VS BP Location FiO2 (%)   03/19/18 0221 03/19/18 0330 03/19/18 0330 03/19/18 0330 --   Tympanic Monitor Sitting Left arm       Pain Score       03/19/18 0221       9           Orthostatic Vital Signs  Vitals:    03/19/18 0221 03/19/18 0223 03/19/18 0330   BP:  142/68 138/75   Pulse: 75  84   Patient Position - Orthostatic VS:   Sitting       Physical Exam   Constitutional: He is oriented to person, place, and time  He appears well-developed and well-nourished  No distress  HENT:   Head: Normocephalic and atraumatic  Nose: Nose normal    Mouth/Throat: Oropharynx is clear and moist    Eyes: Conjunctivae and EOM are normal  Pupils are equal, round, and reactive to light  Neck: Normal range of motion  Neck supple  Cardiovascular: Normal rate, regular rhythm and normal heart sounds  No murmur heard  Pulmonary/Chest: Effort normal and breath sounds normal  No respiratory distress  He has no wheezes  He has no rales  Abdominal: Soft  Bowel sounds are normal  He exhibits no distension  There is no tenderness  There is no rebound and no guarding  Musculoskeletal: Normal range of motion  He exhibits no edema, tenderness or deformity  Neurological: He is alert and oriented to person, place, and time  No cranial nerve deficit  Skin: Skin is warm and dry  Rash noted  He is not diaphoretic  No pallor  Rash on chest chronic   Psychiatric: He has a normal mood and affect  Vitals reviewed        ED Medications  Medications   aspirin chewable tablet 324 mg (324 mg Oral Given 3/19/18 0416)       Diagnostic Studies  Results Reviewed     Procedure Component Value Units Date/Time    Comprehensive metabolic panel [[id number]]  (Abnormal) Collected:  03/19/18 0317    Lab Status:  Final result Specimen:  Blood from Arm, Right Updated:  03/19/18 0346     Sodium 135 (L) mmol/L      Potassium 3 5 mmol/L      Chloride 99 (L) mmol/L      CO2 32 mmol/L Anion Gap 4 mmol/L      BUN 11 mg/dL      Creatinine 1 05 mg/dL      Glucose 105 mg/dL      Calcium 9 6 mg/dL      AST 28 U/L      ALT 32 U/L      Alkaline Phosphatase 86 U/L      Total Protein 8 1 g/dL      Albumin 4 4 g/dL      Total Bilirubin 0 84 mg/dL      eGFR 94 ml/min/1 73sq m     Narrative:         National Kidney Disease Education Program recommendations are as follows:  GFR calculation is accurate only with a steady state creatinine  Chronic Kidney disease less than 60 ml/min/1 73 sq  meters  Kidney failure less than 15 ml/min/1 73 sq  meters  Troponin I [[id number]]  (Normal) Collected:  03/19/18 0317    Lab Status:  Final result Specimen:  Blood from Arm, Right Updated:  03/19/18 0346     Troponin I <0 02 ng/mL     Narrative:         [employer] Chemistry analyzer 99% cutoff is > 0 04 ng/mL in network labs    o cTnI 99% cutoff is useful only when applied to patients in the clinical setting of myocardial ischemia  o cTnI 99% cutoff should be interpreted in the context of clinical history, ECG findings and possibly cardiac imaging to establish correct diagnosis  o cTnI 99% cutoff may be suggestive but clearly not indicative of a coronary event without the clinical setting of myocardial ischemia      CBC and differential [[id number]]  (Abnormal) Collected:  03/19/18 0317    Lab Status:  Final result Specimen:  Blood from Arm, Right Updated:  03/19/18 0327     WBC 9 69 Thousand/uL      RBC 4 32 Million/uL      Hemoglobin 13 5 g/dL      Hematocrit 38 6 %      MCV 89 fL      MCH 31 3 pg      MCHC 35 0 g/dL      RDW 12 2 %      MPV 8 9 fL      Platelets 380 Thousands/uL      nRBC 0 /100 WBCs      Neutrophils Relative 85 (H) %      Lymphocytes Relative 8 (L) %      Monocytes Relative 7 %      Eosinophils Relative 0 %      Basophils Relative 0 %      Neutrophils Absolute 8 15 (H) Thousands/µL      Lymphocytes Absolute 0 81 Thousands/µL      Monocytes Absolute 0 65 Thousand/µL      Eosinophils Absolute 0 04 Thousand/µL      Basophils Absolute 0 02 Thousands/µL                  XR chest 2 views   Final Result by Hawa Barrios MD (03/19 1007)      No acute cardiopulmonary disease  Workstation performed: [id number]               Procedures  ECG 12 Lead Documentation  Date/Time: 3/19/2018 5:00 AM  Performed by: Kari Borjas by: Sherly Ren     Indications / Diagnosis:  Chest pain  Patient location:  ED  Interpretation:     Interpretation: abnormal    Rate:     ECG rate:  84    ECG rate assessment: normal    Rhythm:     Rhythm: sinus rhythm    Ectopy:     Ectopy: none    QRS:     QRS axis:  Normal  Conduction:     Conduction: normal    ST segments:     ST segments:  Normal  T waves:     T waves: inverted      Inverted:  V2 and V3          Phone Consults  ED Phone Contact    ED Course  ED Course                                MDM  CritCare Time    Disposition  Final diagnoses:   Chest pain     Time reflects when diagnosis was documented in both MDM as applicable and the Disposition within this note     Time User Action Codes Description Comment    3/19/2018  3:51 AM Mel Plummer Add [R07 9] Chest pain       ED Disposition     ED Disposition Condition Comment    AMA  Case was discussed with sod and the patient's admission status was agreed to be Admission Status: observation status to the service of Dr Emiliano Clemons   Follow-up Information     Follow up With Specialties Details Why Contact Info    Lo Sanders PA-C Internal Medicine, Physician Assistant Follow up Please call and make an appointment at [place] [id number]   [address] [facility]  [phone]          Discharge Medication List as of 3/19/2018  5:19 AM      CONTINUE these medications which have NOT CHANGED    Details   al mag oxide-diphenhydramine-lidocaine viscous (MAGIC MOUTHWASH) 1:1:1 suspension Swish and spit 10 mL every 6 (six) hours as needed for mouth pain or discomfort, Starting Mon 3/12/2018, Print cloNIDine (CATAPRES) 0 1 mg tablet Take 1 tablet (0 1 mg total) by mouth daily, Starting Mon 3/12/2018, Print           No discharge procedures on file  ED Provider  Attending physically available and evaluated Vanessa Theojuliane ALLEN managed the patient along with the ED Attending      Electronically Signed by         Ember Friedman MD  03/21/18 7209

## 2018-03-19 NOTE — PROGRESS NOTES
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  I advised the patient that he can likely be discharged before that time If everything remains wnl since his last troponin is due at 9 am  Pt asked if he could have something for anxiety, pt was already suppose to be getting clonidine and I told the patient that yes we can also give him hydroxyzine for anxiety  Pt then asked if he can have ativan and I advised the patient and unfortunately I could not give him ativan for the anxiety  On exam patient did not look anxious, his HR was in 80's and he appeared calm and not in distress  After this conversation pt reported that he would like to leave now  I advised the patient that he has already been admitted to the hospital and that it would be medically safer for him to stay and complete the work up  I discussed with the patient that he did have some changes on his EKG and that it is safer for him if he stays for now and leaves later in the morning after the work up is done but he reported that he still wanted to leave  AMA form was printed and risks including but not limited to death, sepsis, infection, arrhthymias, syncope, seizure were discussed, RN DUSTIN was present  Pt reported that he will take his chances and signed the form  IV was removed before discharge  Dr Elyse Jennings and Dr Rachna Velasco aware of this

## 2018-03-19 NOTE — PROGRESS NOTES
[facility] Senior Admission Note   Unit/Bed # @DBLINK (MAKENNA,10325)@ Encounter: [id number]  SOD Team A          Manuel Lal 33 y o  male [id number]       Patient seen and examined  Reviewed H&P per Dr Aida Mitchell  Agree with the assessment and plan unless otherwise noted  Assessment/Plan: Principal Problem:    Chest pain  Active Problems:    Drug abuse    Heroin abuse    Anxiety    Methamphetamine abuse    Hyponatremia    Tobacco dependence     Plan:   -Admit observation   -Trend Troponin x 3  -Morning BMP for hyponatremia   -Telemetry monitoring   -EKG in am  -UDS   -Clonidine   -Nicotine patch   -Cessation counseling for drugs and tobacco dependence   -Potassium PO for hypokalemia   -Regular diet   -Will check HIV and Hep panel   -Consult case management for HOST referral     Disposition: Pt likely to be discharged later today if troponin remain negative  Pt is likely to return to the ED and be admitted again in <30 days 2/2 polysubstance abuse       Disposition:  OBSERVATION    Expected LOS: <2 9 Chary Barba DO

## 2018-03-20 LAB
HBV CORE AB SER QL: NORMAL
HBV CORE IGM SER QL: NORMAL
HBV SURFACE AG SER QL: NORMAL
HCV AB SER QL: NORMAL

## 2018-03-20 NOTE — CASE MANAGEMENT
Initial Clinical Review    Admission: Date/Time/Statement: 3/19/2018    Orders Placed This Encounter   Procedures    Place in Observation (expected length of stay for this patient is less than two midnights)     Standing Status:   Standing     Number of Occurrences:   1     Order Specific Question:   Admitting Physician     Answer:   EVIE ROBLEDO [[id number]]     Order Specific Question:   Level of Care     Answer:   Med Surg [[id number]]         ED: Date/Time/Mode of Arrival:   ED Arrival Information     Expected Arrival Acuity Means of Arrival Escorted By Service Admission Type    - 3/19/2018 02:15 Urgent Walk-In Self General Medicine Urgent    Arrival Complaint    Trouble Breathing          Chief Complaint:   Chief Complaint   Patient presents with    Chest Pain     chest pain and trouble breathing for a couple days       History of Illness: Pt has returned to the ED with chest pain  Pt recently left against medical advice at 5: 15 am  Please see H&P from previous encounter for more details  33 y o  male who uses multiple recreational drugs and was recently admitted for similar presentation to today a week ago  Patient states today he used methamphetamines about 10 hours prior to presentation and tonight had difficulty sleeping due to anxiety a persistent left-sided chest pain  He says that the chest pain is a pressure, without radiation, and with the associated symptom of shortness of breath  Otherwise he denies abdominal pain, nausea, vomiting, or diarrhea  He says the symptoms of anxiety with chest pain and shortness of breath are similar to what he felt prior to his last presentation a week ago, but actually less severe today    He admits to using methamphetamines earlier today and states this is only the 2nd time he has used them, the 1st time being just prior to his admission a week ago for similar symptoms      He also has an erythematous maculopapular rash with eruptions in areas of excoriation across his chest  He says that this 1st appeared approximately 1 month ago, suddenly, and is nonpainful and not itchy  He is unsure exactly as to how, Y, or when it started  He says that at times the rub shins have bled  He notes no similar lesions elsewhere on his body  He cannot think of any triggers such as contacts, or other new detergents or washes  He states that he does not use injection drugs      ED Vital Signs:   ED Triage Vitals   Temperature Pulse Respirations Blood Pressure SpO2   03/19/18 0221 03/19/18 0221 03/19/18 0221 03/19/18 0223 03/19/18 0221   (!) 96 5 °F (35 8 °C) 75 16 142/68 98 %      Temp Source Heart Rate Source Patient Position - Orthostatic VS BP Location FiO2 (%)   03/19/18 0221 03/19/18 0330 03/19/18 0330 03/19/18 0330 --   Tympanic Monitor Sitting Left arm       Pain Score       03/19/18 0221       9        Wt Readings from Last 1 Encounters:   03/19/18 77 1 kg (170 lb)       Vital Signs (abnormal):   /19/18 0221   96 5 °F (35 8 °C)  75  16  --  98 %          Abnormal Labs/Diagnostic Test Results:   Sodium 135 (L) 136 - 145 mmol/L      Potassium 3 5 3 5 - 5 3 mmol/L     Chloride 99 (L) 100 - 108 mmol/L     CO2 32 21 - 32 mmol/L          ED Treatment:   Medication Administration from 03/19/2018 0215 to 03/20/2018 1704       Date/Time Order Dose Route Action Action by Comments     03/19/2018 0416 aspirin chewable tablet 324 mg 324 mg Oral Given Alicia Brar RN           Past Medical/Surgical History: Active Ambulatory Problems     Diagnosis Date Noted    Acute kidney injury ([facility] [address] ) 03/12/2018    Chest pain 03/12/2018    Drug abuse 03/12/2018    Heroin abuse 03/12/2018    Anxiety 03/12/2018    Methamphetamine abuse 03/12/2018    Melena 03/12/2018    Acute blood loss anemia 03/12/2018     Resolved Ambulatory Problems     Diagnosis Date Noted    No Resolved Ambulatory Problems     No Additional Past Medical History       Admitting Diagnosis: Chest pain [R07 9]    Age/Sex: 33 y o  male    Assessment/Plan: Principle problem(s) necessitating todays visit:  Chest pain secondary to methamphetamine use/anxiety  Secondary diagnoses and management, as detailed in resident A&P, reviewed and accepted  Pertinent lab studies/radiographic results reviewed and discussed with resident  Serial EKGs without acute abnormalities save for persistent flattening of T-waves in leads V2 and V3  Initial cardiac enzymes negative  Key findings excoriated macular papular rash across the chest  Patient has recent admission for substance abuse with heroin  1  Chest pain              - left-sided chest pain described as pressure without radiation and is associated with shortness of breath and anxiety  Patient states this is similar to pain he presented with 1 week ago but is actually less severe this time  - initial troponin negative, will trend              - EKG with flattening of T-waves in leads V2 and V3, otherwise unremarkable              - aspirin              - will observe, follow troponins, repeat EKG  2  Anxiety              - patient states he has been anxious might since using meth and has been unable to sleep  - will give clonidine  3  Methamphetamine use              - patient states that the above-described symptoms only occurred once before that being during his prior the presentation 1 week ago which followed methamphetamine use  These are the only 2 times patient states he has ever used methamphetamines  He states that his friends use them regularly and do not have these symptoms so he does not understand why it happens to him              - encouraged patient to discontinue methamphetamine use as otherwise these events and hospital visits are likely to continue  4   Maculopapular rash              - patient with maculopapular rash with significant areas of excoriation across his chest   He states it appeared 1 month ago but does not hurt or itch, although appearance suggests he has been scratching it frequently              - unclear etiology, may be related to patient's multiple recreational drug use     Code Status: Level 1 - Full Code  VTE Pharmacologic Prophylaxis: Sequential compression device (Venodyne)    VTE Mechanical Prophylaxis: sequential compression device  Admission Status: OBSERVATION     Admission Time  I spent 1 hour admitting the patient  This involved direct patient contact where I performed a full history and physical, reviewing previous records, and reviewing laboratory and other diagnostic studies      Admission Orders:  Scheduled Meds:   Continuous Infusions:   No current facility-administered medications for this encounter     PRN Meds:

## 2018-12-21 ENCOUNTER — HOSPITAL ENCOUNTER (OUTPATIENT)
Facility: HOSPITAL | Age: 33
Setting detail: OBSERVATION
Discharge: HOME | End: 2018-12-22
Attending: EMERGENCY MEDICINE | Admitting: HOSPITALIST
Payer: COMMERCIAL

## 2018-12-21 DIAGNOSIS — T50.901A DRUG OVERDOSE: Primary | ICD-10-CM

## 2018-12-21 PROCEDURE — 99285 EMERGENCY DEPT VISIT HI MDM: CPT

## 2018-12-22 ENCOUNTER — APPOINTMENT (EMERGENCY)
Dept: RADIOLOGY | Facility: HOSPITAL | Age: 33
End: 2018-12-22
Payer: COMMERCIAL

## 2018-12-22 VITALS
HEART RATE: 78 BPM | DIASTOLIC BLOOD PRESSURE: 72 MMHG | HEIGHT: 69 IN | RESPIRATION RATE: 18 BRPM | TEMPERATURE: 98.6 F | BODY MASS INDEX: 24.44 KG/M2 | WEIGHT: 165 LBS | SYSTOLIC BLOOD PRESSURE: 111 MMHG | OXYGEN SATURATION: 100 %

## 2018-12-22 PROBLEM — D72.829 LEUKOCYTOSIS: Status: ACTIVE | Noted: 2018-12-22
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
## 2018-12-22 PROBLEM — T50.901A ACCIDENTAL DRUG OVERDOSE: Status: ACTIVE | Noted: 2018-12-22
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
## 2018-12-22 PROBLEM — T50.901A ACCIDENTAL DRUG OVERDOSE: Status: RESOLVED | Noted: 2018-12-22 | Resolved: 2018-12-22

## 2018-12-22 LAB
ALBUMIN SERPL BCP-MCNC: 3.9 G/DL (ref 3.5–5)
ALP SERPL-CCNC: 80 U/L (ref 46–116)
ALT SERPL W P-5'-P-CCNC: 32 U/L (ref 12–78)
AMPHETAMINES SERPL QL SCN: POSITIVE
ANION GAP SERPL CALCULATED.3IONS-SCNC: 6 MMOL/L (ref 4–13)
APAP SERPL-MCNC: <2 UG/ML (ref 10–30)
AST SERPL W P-5'-P-CCNC: 26 U/L (ref 5–45)
ATRIAL RATE: 68 BPM
BARBITURATES UR QL: NEGATIVE
BASOPHILS # BLD AUTO: 0.03 THOUSANDS/ΜL (ref 0–0.1)
BASOPHILS NFR BLD AUTO: 0 % (ref 0–1)
BENZODIAZ UR QL: NEGATIVE
BILIRUB SERPL-MCNC: 0.36 MG/DL (ref 0.2–1)
BUN SERPL-MCNC: 10 MG/DL (ref 5–25)
CALCIUM SERPL-MCNC: 9.1 MG/DL (ref 8.3–10.1)
CHLORIDE SERPL-SCNC: 102 MMOL/L (ref 100–108)
CO2 SERPL-SCNC: 32 MMOL/L (ref 21–32)
COCAINE UR QL: NEGATIVE
CREAT SERPL-MCNC: 1.13 MG/DL (ref 0.6–1.3)
EOSINOPHIL # BLD AUTO: 0.03 THOUSAND/ΜL (ref 0–0.61)
EOSINOPHIL NFR BLD AUTO: 0 % (ref 0–6)
ERYTHROCYTE [DISTWIDTH] IN BLOOD BY AUTOMATED COUNT: 12.2 % (ref 11.6–15.1)
ETHANOL EXG-MCNC: 0 MG/DL
ETHANOL SERPL-MCNC: <3 MG/DL (ref 0–3)
GFR SERPL CREATININE-BSD FRML MDRD: 86 ML/MIN/1.73SQ M
GLUCOSE P FAST SERPL-MCNC: 91 MG/DL (ref 65–99)
GLUCOSE SERPL-MCNC: 91 MG/DL (ref 65–140)
HCT VFR BLD AUTO: 36.7 % (ref 36.5–49.3)
HGB BLD-MCNC: 12.2 G/DL (ref 12–17)
IMM GRANULOCYTES # BLD AUTO: 0.04 THOUSAND/UL (ref 0–0.2)
IMM GRANULOCYTES NFR BLD AUTO: 0 % (ref 0–2)
LYMPHOCYTES # BLD AUTO: 1.04 THOUSANDS/ΜL (ref 0.6–4.47)
LYMPHOCYTES NFR BLD AUTO: 8 % (ref 14–44)
MAGNESIUM SERPL-MCNC: 2.9 MG/DL (ref 1.6–2.6)
MCH RBC QN AUTO: 30.8 PG (ref 26.8–34.3)
MCHC RBC AUTO-ENTMCNC: 33.2 G/DL (ref 31.4–37.4)
MCV RBC AUTO: 93 FL (ref 82–98)
METHADONE UR QL: NEGATIVE
MONOCYTES # BLD AUTO: 0.89 THOUSAND/ΜL (ref 0.17–1.22)
MONOCYTES NFR BLD AUTO: 7 % (ref 4–12)
NEUTROPHILS # BLD AUTO: 11.76 THOUSANDS/ΜL (ref 1.85–7.62)
NEUTS SEG NFR BLD AUTO: 85 % (ref 43–75)
NRBC BLD AUTO-RTO: 0 /100 WBCS
OPIATES UR QL SCN: POSITIVE
P AXIS: 73 DEGREES
PCP UR QL: NEGATIVE
PHOSPHATE SERPL-MCNC: 4.6 MG/DL (ref 2.7–4.5)
PLATELET # BLD AUTO: 401 THOUSANDS/UL (ref 149–390)
PMV BLD AUTO: 8.7 FL (ref 8.9–12.7)
POTASSIUM SERPL-SCNC: 3.6 MMOL/L (ref 3.5–5.3)
PR INTERVAL: 140 MS
PROT SERPL-MCNC: 7.4 G/DL (ref 6.4–8.2)
QRS AXIS: 62 DEGREES
QRSD INTERVAL: 86 MS
QT INTERVAL: 396 MS
QTC INTERVAL: 421 MS
RBC # BLD AUTO: 3.96 MILLION/UL (ref 3.88–5.62)
SALICYLATES SERPL-MCNC: <3 MG/DL (ref 3–20)
SODIUM SERPL-SCNC: 140 MMOL/L (ref 136–145)
T WAVE AXIS: 18 DEGREES
THC UR QL: NEGATIVE
VENTRICULAR RATE: 68 BPM
WBC # BLD AUTO: 13.79 THOUSAND/UL (ref 4.31–10.16)

## 2018-12-22 PROCEDURE — 82075 ASSAY OF BREATH ETHANOL: CPT | Performed by: EMERGENCY MEDICINE

## 2018-12-22 PROCEDURE — 72125 CT NECK SPINE W/O DYE: CPT

## 2018-12-22 PROCEDURE — 93005 ELECTROCARDIOGRAM TRACING: CPT

## 2018-12-22 PROCEDURE — 84100 ASSAY OF PHOSPHORUS: CPT | Performed by: INTERNAL MEDICINE

## 2018-12-22 PROCEDURE — 85025 COMPLETE CBC W/AUTO DIFF WBC: CPT | Performed by: INTERNAL MEDICINE

## 2018-12-22 PROCEDURE — 70450 CT HEAD/BRAIN W/O DYE: CPT

## 2018-12-22 PROCEDURE — 80307 DRUG TEST PRSMV CHEM ANLYZR: CPT | Performed by: INTERNAL MEDICINE

## 2018-12-22 PROCEDURE — 99220 PR INITIAL OBSERVATION CARE/DAY 70 MINUTES: CPT | Performed by: HOSPITALIST

## 2018-12-22 PROCEDURE — 80329 ANALGESICS NON-OPIOID 1 OR 2: CPT | Performed by: EMERGENCY MEDICINE

## 2018-12-22 PROCEDURE — 71046 X-RAY EXAM CHEST 2 VIEWS: CPT

## 2018-12-22 PROCEDURE — 80320 DRUG SCREEN QUANTALCOHOLS: CPT | Performed by: EMERGENCY MEDICINE

## 2018-12-22 PROCEDURE — 83735 ASSAY OF MAGNESIUM: CPT | Performed by: INTERNAL MEDICINE

## 2018-12-22 PROCEDURE — 80053 COMPREHEN METABOLIC PANEL: CPT | Performed by: INTERNAL MEDICINE

## 2018-12-22 PROCEDURE — 36415 COLL VENOUS BLD VENIPUNCTURE: CPT | Performed by: EMERGENCY MEDICINE

## 2018-12-22 PROCEDURE — 70486 CT MAXILLOFACIAL W/O DYE: CPT

## 2018-12-22 RX ORDER — SODIUM CHLORIDE 9 MG/ML
125 INJECTION, SOLUTION INTRAVENOUS CONTINUOUS
Status: DISCONTINUED | OUTPATIENT
Start: 2018-12-22 | End: 2018-12-22

## 2018-12-22 RX ORDER — NICOTINE 21 MG/24HR
1 PATCH, TRANSDERMAL 24 HOURS TRANSDERMAL DAILY
Status: DISCONTINUED | OUTPATIENT
Start: 2018-12-22 | End: 2018-12-22 | Stop reason: HOSPADM

## 2018-12-22 RX ADMIN — SODIUM CHLORIDE 125 ML/HR: 0.9 INJECTION, SOLUTION INTRAVENOUS at 04:00

## 2018-12-22 NOTE — DISCHARGE INSTRUCTIONS
Adult Overdose   WHAT YOU NEED TO KNOW:   An overdose occurs when you take more medicine than is safe to take  An overdose may be mild, or it may be a life-threatening emergency  You may feel drowsy, dizzy, or nauseated, depending on what medicine you took  No specific harm was found to your body as a result of your overdose  Your symptoms have decreased over the last 6 to 12 hours  DISCHARGE INSTRUCTIONS:   Call 911 if you or someone close to you has any of the following symptoms:   · Your face is very pale and clammy to the touch  · Your body is limp or you are unable to speak  · You cannot be awakened  · Your breathing is slower or faster than usual      · Your heart is beating slower than usual     · You feel confused or more tired than usual, or you are sweating more than normal     · Your speech is slurred  · Your fingernails or lips are blue or purple  Return to the emergency department if:   · You have severe nausea and vomiting  · You cannot have a bowel movement or urinate  · Your skin and the whites of your eyes turn yellow  Contact your healthcare provider if:   · You think your medicine is not working  · You have nausea, vomiting, diarrhea, or abdominal cramps  · You have questions or concerns about your medicine  Take your medicine as directed:  Contact your healthcare provider if you think your medicine is not helping or if you have side effects  Do not take more medicine that is prescribed  Keep your medicines in the original containers  Keep a list of the medicines, vitamins, and herbs you take  Include the amounts, and when and why you take them  Do not share your medicine with others  Prevent another overdose:   · Read labels carefully  Read the labels of all the medicines that you take  Never take more than the label says to take  If you have questions, ask your pharmacist or healthcare provider  · Do not drink alcohol    Alcohol increases your risk for another overdose  Alcohol can also hide important symptoms that you need to call your healthcare provider for  · Do not drive or operate machinery  until your healthcare provider says it is okay  These activities may be dangerous after an overdose  · Use caution if you take more than one medicine at a time  Mixing medicines or taking more than one medicine at a time can be dangerous  · Tell your family or friends what medicines you are taking  Talk with them about what to do if you have an overdose  Follow up with your healthcare provider as directed: You may need to see a counselor or psychiatrist  Write down your questions so you remember to ask them during your visits  © 2017 [facility] Information is for End User's use only and may not be sold, redistributed or otherwise used for commercial purposes  All illustrations and images included in CareNotes® are the copyrighted property of [employer]  or [employer]  The above information is an  only  It is not intended as medical advice for individual conditions or treatments  Talk to your doctor, nurse or pharmacist before following any medical regimen to see if it is safe and effective for you
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
## 2018-12-22 NOTE — PROGRESS NOTES
Pt ordered tele which we cannot do here on MS4  Pt access made aware  Trying to find bed for patient on another floor
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
## 2018-12-22 NOTE — PLAN OF CARE
Problem: Potential for Falls  Goal: Patient will remain free of falls  INTERVENTIONS:  - Assess patient frequently for physical needs  -  Identify cognitive and physical deficits and behaviors that affect risk of falls    -  [place] fall precautions as indicated by assessment   - Educate patient/family on patient safety including physical limitations  - Instruct patient to call for assistance with activity based on assessment  - Modify environment to reduce risk of injury  - Consider OT/PT consult to assist with strengthening/mobility   Outcome: Progressing      Problem: DISCHARGE PLANNING - CARE MANAGEMENT  Goal: Discharge to post-acute care or home with appropriate resources  INTERVENTIONS:  - Conduct assessment to determine patient/family and health care team treatment goals, and need for post-acute services based on payer coverage, community resources, and patient preferences, and barriers to discharge  - Address psychosocial, clinical, and financial barriers to discharge as identified in assessment in conjunction with the patient/family and health care team  - Arrange appropriate level of post-acute services according to patients   needs and preference and payer coverage in collaboration with the physician and health care team  - Communicate with and update the patient/family, physician, and health care team regarding progress on the discharge plan  - Arrange appropriate transportation to post-acute venues   Outcome: Progressing      Problem: PAIN - ADULT  Goal: Verbalizes/displays adequate comfort level or baseline comfort level  Interventions:  - Encourage patient to monitor pain and request assistance  - Assess pain using appropriate pain scale  - Administer analgesics based on type and severity of pain and evaluate response  - Implement non-pharmacological measures as appropriate and evaluate response  - Consider cultural and social influences on pain and pain management  - Notify physician/advanced practitioner if interventions unsuccessful or patient reports new pain  Outcome: Progressing      Problem: INFECTION - ADULT  Goal: Absence or prevention of progression during hospitalization  INTERVENTIONS:  - Assess and monitor for signs and symptoms of infection  - Monitor lab/diagnostic results  - Monitor all insertion sites, i e  indwelling lines, tubes, and drains  - Monitor endotracheal (as able) and nasal secretions for changes in amount and color  - [place] appropriate cooling/warming therapies per order  - Administer medications as ordered  - Instruct and encourage patient and family to use good hand hygiene technique  - Identify and instruct in appropriate isolation precautions for identified infection/condition  Outcome: Progressing    Goal: Absence of fever/infection during neutropenic period  INTERVENTIONS:  - Monitor WBC  - Implement neutropenic guidelines  Outcome: Progressing      Problem: SAFETY ADULT  Goal: Maintain or return to baseline ADL function  INTERVENTIONS:  -  Assess patient's ability to carry out ADLs; assess patient's baseline for ADL function and identify physical deficits which impact ability to perform ADLs (bathing, care of mouth/teeth, toileting, grooming, dressing, etc )  - Assess/evaluate cause of self-care deficits   - Assess range of motion  - Assess patient's mobility; develop plan if impaired  - Assess patient's need for assistive devices and provide as appropriate  - Encourage maximum independence but intervene and supervise when necessary  ¯ Involve family in performance of ADLs  ¯ Assess for home care needs following discharge   ¯ Request OT consult to assist with ADL evaluation and planning for discharge  ¯ Provide patient education as appropriate  Outcome: Progressing    Goal: Maintain or return mobility status to optimal level  INTERVENTIONS:  - Assess patient's baseline mobility status (ambulation, transfers, stairs, etc )    - Identify cognitive and physical deficits and behaviors that affect mobility  - Identify mobility aids required to assist with transfers and/or ambulation (gait belt, sit-to-stand, lift, walker, cane, etc )  - [place] fall precautions as indicated by assessment  - Record patient progress and toleration of activity level on Mobility SBAR; progress patient to next Phase/Stage  - Instruct patient to call for assistance with activity based on assessment  - Request Rehabilitation consult to assist with strengthening/weightbearing, etc   Outcome: Progressing      Problem: DISCHARGE PLANNING  Goal: Discharge to home or other facility with appropriate resources  INTERVENTIONS:  - Identify barriers to discharge w/patient and caregiver  - Arrange for needed discharge resources and transportation as appropriate  - Identify discharge learning needs (meds, wound care, etc )  - Arrange for interpretive services to assist at discharge as needed  - Refer to Case Management Department for coordinating discharge planning if the patient needs post-hospital services based on physician/advanced practitioner order or complex needs related to functional status, cognitive ability, or social support system  Outcome: Progressing

## 2018-12-22 NOTE — PROGRESS NOTES
Spoke with SOD-A, patient tolerated lunch and ambulated in [facility] for d/c home
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
## 2018-12-22 NOTE — ED NOTES
BAT = 0 [id number]       Denia Polo  12/22/18 0013
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
## 2019-04-03 ENCOUNTER — HOSPITAL ENCOUNTER (EMERGENCY)
Facility: HOSPITAL | Age: 34
Discharge: HOME | End: 2019-04-03
Attending: EMERGENCY MEDICINE | Admitting: EMERGENCY MEDICINE

## 2019-04-03 ENCOUNTER — APPOINTMENT (EMERGENCY)
Dept: RADIOLOGY | Facility: HOSPITAL | Age: 34
End: 2019-04-03

## 2019-04-03 VITALS
WEIGHT: 163.14 LBS | SYSTOLIC BLOOD PRESSURE: 96 MMHG | DIASTOLIC BLOOD PRESSURE: 56 MMHG | TEMPERATURE: 98.4 F | BODY MASS INDEX: 24.09 KG/M2 | HEART RATE: 61 BPM | OXYGEN SATURATION: 98 % | RESPIRATION RATE: 18 BRPM

## 2019-04-03 DIAGNOSIS — R55 NEAR SYNCOPE: Primary | ICD-10-CM

## 2019-04-03 LAB
ALBUMIN SERPL BCP-MCNC: 3.9 G/DL (ref 3.5–5)
ALP SERPL-CCNC: 67 U/L (ref 46–116)
ALT SERPL W P-5'-P-CCNC: 27 U/L (ref 12–78)
ANION GAP SERPL CALCULATED.3IONS-SCNC: 4 MMOL/L (ref 4–13)
AST SERPL W P-5'-P-CCNC: 22 U/L (ref 5–45)
ATRIAL RATE: 65 BPM
BASOPHILS # BLD AUTO: 0.01 THOUSANDS/ΜL (ref 0–0.1)
BASOPHILS NFR BLD AUTO: 0 % (ref 0–1)
BILIRUB SERPL-MCNC: 0.82 MG/DL (ref 0.2–1)
BUN SERPL-MCNC: 17 MG/DL (ref 5–25)
CALCIUM SERPL-MCNC: 8.8 MG/DL (ref 8.3–10.1)
CHLORIDE SERPL-SCNC: 102 MMOL/L (ref 100–108)
CO2 SERPL-SCNC: 29 MMOL/L (ref 21–32)
CREAT SERPL-MCNC: 1.51 MG/DL (ref 0.6–1.3)
EOSINOPHIL # BLD AUTO: 0 THOUSAND/ΜL (ref 0–0.61)
EOSINOPHIL NFR BLD AUTO: 0 % (ref 0–6)
ERYTHROCYTE [DISTWIDTH] IN BLOOD BY AUTOMATED COUNT: 12.7 % (ref 11.6–15.1)
GFR SERPL CREATININE-BSD FRML MDRD: 60 ML/MIN/1.73SQ M
GLUCOSE SERPL-MCNC: 125 MG/DL (ref 65–140)
HCT VFR BLD AUTO: 39.2 % (ref 36.5–49.3)
HGB BLD-MCNC: 12.7 G/DL (ref 12–17)
IMM GRANULOCYTES # BLD AUTO: 0.03 THOUSAND/UL (ref 0–0.2)
IMM GRANULOCYTES NFR BLD AUTO: 0 % (ref 0–2)
LYMPHOCYTES # BLD AUTO: 0.65 THOUSANDS/ΜL (ref 0.6–4.47)
LYMPHOCYTES NFR BLD AUTO: 8 % (ref 14–44)
MCH RBC QN AUTO: 30.5 PG (ref 26.8–34.3)
MCHC RBC AUTO-ENTMCNC: 32.4 G/DL (ref 31.4–37.4)
MCV RBC AUTO: 94 FL (ref 82–98)
MONOCYTES # BLD AUTO: 0.58 THOUSAND/ΜL (ref 0.17–1.22)
MONOCYTES NFR BLD AUTO: 7 % (ref 4–12)
NEUTROPHILS # BLD AUTO: 6.57 THOUSANDS/ΜL (ref 1.85–7.62)
NEUTS SEG NFR BLD AUTO: 85 % (ref 43–75)
NRBC BLD AUTO-RTO: 0 /100 WBCS
P AXIS: 68 DEGREES
PLATELET # BLD AUTO: 242 THOUSANDS/UL (ref 149–390)
PMV BLD AUTO: 9.2 FL (ref 8.9–12.7)
POTASSIUM SERPL-SCNC: 3.9 MMOL/L (ref 3.5–5.3)
PR INTERVAL: 150 MS
PROT SERPL-MCNC: 7.6 G/DL (ref 6.4–8.2)
QRS AXIS: 79 DEGREES
QRSD INTERVAL: 96 MS
QT INTERVAL: 392 MS
QTC INTERVAL: 407 MS
RBC # BLD AUTO: 4.16 MILLION/UL (ref 3.88–5.62)
SODIUM SERPL-SCNC: 135 MMOL/L (ref 136–145)
T WAVE AXIS: 7 DEGREES
TROPONIN I SERPL-MCNC: <0.02 NG/ML
VENTRICULAR RATE: 65 BPM
WBC # BLD AUTO: 7.84 THOUSAND/UL (ref 4.31–10.16)

## 2019-04-03 PROCEDURE — 85025 COMPLETE CBC W/AUTO DIFF WBC: CPT | Performed by: EMERGENCY MEDICINE

## 2019-04-03 PROCEDURE — 93010 ELECTROCARDIOGRAM REPORT: CPT | Performed by: INTERNAL MEDICINE

## 2019-04-03 PROCEDURE — 99283 EMERGENCY DEPT VISIT LOW MDM: CPT | Performed by: EMERGENCY MEDICINE

## 2019-04-03 PROCEDURE — 99285 EMERGENCY DEPT VISIT HI MDM: CPT

## 2019-04-03 PROCEDURE — 71046 X-RAY EXAM CHEST 2 VIEWS: CPT

## 2019-04-03 PROCEDURE — 93005 ELECTROCARDIOGRAM TRACING: CPT

## 2019-04-03 PROCEDURE — 84484 ASSAY OF TROPONIN QUANT: CPT | Performed by: EMERGENCY MEDICINE

## 2019-04-03 PROCEDURE — 80053 COMPREHEN METABOLIC PANEL: CPT | Performed by: EMERGENCY MEDICINE

## 2019-04-03 PROCEDURE — 36415 COLL VENOUS BLD VENIPUNCTURE: CPT

## 2019-04-03 RX ADMIN — SODIUM CHLORIDE 1000 ML: 0.9 INJECTION, SOLUTION INTRAVENOUS at 13:12

## 2019-08-30 ENCOUNTER — HOSPITAL ENCOUNTER (EMERGENCY)
Facility: HOSPITAL | Age: 34
Discharge: HOME | End: 2019-08-30
Attending: EMERGENCY MEDICINE
Payer: COMMERCIAL

## 2019-08-30 VITALS
SYSTOLIC BLOOD PRESSURE: 116 MMHG | BODY MASS INDEX: 24.16 KG/M2 | WEIGHT: 163.14 LBS | HEART RATE: 72 BPM | RESPIRATION RATE: 16 BRPM | OXYGEN SATURATION: 96 % | HEIGHT: 69 IN | DIASTOLIC BLOOD PRESSURE: 68 MMHG | TEMPERATURE: 98.2 F

## 2019-08-30 DIAGNOSIS — F19.10 DRUG ABUSE (HCC): Primary | ICD-10-CM

## 2019-08-30 DIAGNOSIS — F41.9 ANXIETY: ICD-10-CM

## 2019-08-30 PROCEDURE — 99282 EMERGENCY DEPT VISIT SF MDM: CPT

## 2019-08-30 PROCEDURE — 99284 EMERGENCY DEPT VISIT MOD MDM: CPT | Performed by: EMERGENCY MEDICINE

## 2019-08-30 NOTE — ED PROVIDER NOTES
History  Chief Complaint   Patient presents with    Addiction Problem     Pt using snorted heroin, last used 2 lines 1 hour ago, denies SI/HI  Here because his friends want hiim to get help     81-year-old male with history of heroin abuse presents today with anxiety and concern from family about using heroin today  Says he was at court hearing today, his  did not show, so he left and used heroin because he was very stressed  His sister dropped him off here to the ED because he was nodding off and she was concerned about overdose  Patient says he snorted 2 lines of heroin, as far as he knows it is the same as what he has used in the past   Has no other complaints other than anxiety, aware that he is nodding off  Denies chest pain, abdominal pain, shortness of breath, fever, chills, nausea, vomiting, diarrhea  Says he is not interested in rehab at this time  When the topic of narcan came up, sardonically asked if he could get a cupcake with it, says he "loves" getting narcan  None       Past Medical History:   Diagnosis Date    Anxiety        History reviewed  No pertinent surgical history  Family History   Problem Relation Age of Onset    Diabetes Brother     Heart disease Maternal Grandmother      I have reviewed and agree with the history as documented  Social History     Tobacco Use    Smoking status: Current Some Day Smoker    Smokeless tobacco: Never Used   Substance Use Topics    Alcohol use: Yes     Comment: social    Drug use: Yes     Types: Methamphetamines, Prescription, Heroin     Comment: pt states hes been clean since march 1st         Review of Systems   Constitutional: Negative for chills, fatigue and fever  HENT: Negative for ear pain, sinus pain and sore throat  Eyes: Negative for pain  Respiratory: Negative for shortness of breath  Cardiovascular: Negative for chest pain  Gastrointestinal: Negative for abdominal pain, diarrhea, nausea and vomiting  Genitourinary: Negative for difficulty urinating, dysuria and flank pain  Musculoskeletal: Negative for back pain and neck pain  Psychiatric/Behavioral: The patient is nervous/anxious  All other systems reviewed and are negative  Physical Exam  ED Triage Vitals [08/30/19 1129]   Temperature Pulse Respirations Blood Pressure SpO2   98 2 °F (36 8 °C) 77 16 140/86 96 %      Temp Source Heart Rate Source Patient Position - Orthostatic VS BP Location FiO2 (%)   Oral Monitor Sitting Left arm --      Pain Score       No Pain             Orthostatic Vital Signs  Vitals:    08/30/19 1145 08/30/19 1242 08/30/19 1356 08/30/19 1502   BP: 118/61 110/71  116/68   Pulse: 76 78 62 72   Patient Position - Orthostatic VS:  Sitting Lying Lying       Physical Exam   Constitutional: He appears well-developed  No distress  Unkempt, sleepy, nodding off   HENT:   Head: Normocephalic  Nose: Nose normal    Mouth/Throat: Oropharynx is clear and moist    Eyes: Conjunctivae and EOM are normal  Right eye exhibits no discharge  Left eye exhibits no discharge  No scleral icterus  Neck: Normal range of motion  Neck supple  Cardiovascular: Normal rate, regular rhythm and normal heart sounds  Exam reveals no gallop and no friction rub  No murmur heard  Pulmonary/Chest: Effort normal and breath sounds normal  No stridor  No respiratory distress  He has no wheezes  Decreased respiratory rate, apneic   Abdominal: Soft  Bowel sounds are normal  He exhibits no distension  There is no tenderness  There is no rebound and no guarding  Neurological: He is alert  No cranial nerve deficit  Skin: Skin is warm and dry  Rash noted  He is not diaphoretic  Scattered ulcerations versus excoriations over upper chest, face   Psychiatric: Thought content normal    Nursing note and vitals reviewed        ED Medications  Medications - No data to display    Diagnostic Studies  Results Reviewed     None                 No orders to display Procedures  Procedures        ED Course                               MDM  Number of Diagnoses or Management Options  Anxiety:   Drug abuse [facility]):   Diagnosis management comments: Keep patient on monitor, monitor for desaturation, expect to discharge with stabilization of oxygenation/ventilation  Has no interest in rehab  He is hoping to avoid Narcan  Patient later accompanied by cousin  She avoids eye contact with me, has no questions  Patient to be picked up by father at 3:30p  Asks for note excusing him from court  Said I can only document his time in the ED  Disposition  Final diagnoses:   Drug abuse [facility])   Anxiety     Time reflects when diagnosis was documented in both MDM as applicable and the Disposition within this note     Time User Action Codes Description Comment    8/30/2019  3:25 PM Corby Ying Add [R38 80] Drug abuse ([address] )     8/30/2019  3:26 PM Corby Ying Add [F41 9] Anxiety       ED Disposition     ED Disposition Condition Date/Time Comment    Discharge Stable Fri Aug 30, 2019  3:26 PM [facility] discharge to home/self care  Follow-up Information     Follow up With Specialties Details Why Contact Info Additional [address] Emergency Department Emergency Medicine Go to  If symptoms worsen [address]  [phone]  ED, [address] [place] [address]  Call  To find [phone]             There are no discharge medications for this patient  No discharge procedures on file  ED Provider  Attending physically available and evaluated [facility]  I managed the patient along with the ED Attending      Electronically Signed by         Marjorie Carranza MD  09/01/19 8615

## 2019-08-30 NOTE — ED ATTENDING ATTESTATION
I,Koko Atkins MD, saw and evaluated the patient  I have discussed the patient with the resident/non-physician practitioner and agree with the resident's/non-physician practitioner's findings, Plan of Care, and MDM as documented in the resident's/non-physician practitioner's note, except where noted  All available labs and Radiology studies were reviewed  I was present for key portions of any procedure(s) performed by the resident/non-physician practitioner and I was immediately available to provide assistance  At this point I agree with the current assessment done in the Emergency Department  I have conducted an independent evaluation of this patient including a focused history and a physical exam       31-year-old male, presenting to the emergency department for evaluation of heroin use  Patient states that he had been clean for the past 3 weeks, had a court appearance today which made him anxious, subsequently used 2 lines of heroin, and was brought to the emergency department by his sister because he was somnolent  Patient reports that he snorted the lines at approximately 10:00  Patient currently has no complaints  Patient denies headache, chest pain, shortness of breath, abdominal pain  Patient denies suicidal and homicidal ideation  The patient is resting comfortably on a stretcher in no acute respiratory distress  The patient appears nontoxic  HEENT reveals moist mucous membranes  Head is normocephalic and atraumatic  Conjunctiva and sclera are normal  Neck is nontender and supple with full range of motion to flexion, extension, lateral rotation  No meningismus appreciated  No masses are appreciated  Lungs are clear to auscultation bilaterally without any wheezes, rales or rhonchi  Heart is regular rate and rhythm without any murmurs, rubs or gallops  Abdomen is soft and nontender without any rebound or guarding  Extremities appear grossly normal without any significant arthropathy  Patient is awake, alert, and oriented x3  The patient has normal interaction  Motor is 5 out of 5  Patient will be observed in the emergency department and discharged  He currently does not request detox

## 2019-08-30 NOTE — ED RE-EVALUATION NOTE
Patient remains alert, speech is clear, he states his father is going to come pick him up from the emergency department, so the patient will be discharged  He is ambulating steadily through the emergency department       May Alba MD  08/30/19 9164

## 2020-06-03 ENCOUNTER — HOSPITAL ENCOUNTER (EMERGENCY)
Facility: HOSPITAL | Age: 35
Discharge: HOME | End: 2020-06-03
Attending: EMERGENCY MEDICINE | Admitting: EMERGENCY MEDICINE
Payer: COMMERCIAL

## 2020-06-03 VITALS
SYSTOLIC BLOOD PRESSURE: 133 MMHG | RESPIRATION RATE: 16 BRPM | HEART RATE: 108 BPM | TEMPERATURE: 98.3 F | DIASTOLIC BLOOD PRESSURE: 70 MMHG | OXYGEN SATURATION: 96 %

## 2020-06-03 DIAGNOSIS — F19.10 SUBSTANCE ABUSE (HCC): ICD-10-CM

## 2020-06-03 DIAGNOSIS — T50.901A OVERDOSE: Primary | ICD-10-CM

## 2020-06-03 PROCEDURE — 99282 EMERGENCY DEPT VISIT SF MDM: CPT | Performed by: EMERGENCY MEDICINE

## 2020-06-03 PROCEDURE — 99285 EMERGENCY DEPT VISIT HI MDM: CPT

## 2020-06-03 RX ORDER — NALOXONE HYDROCHLORIDE 1 MG/ML
2 INJECTION PARENTERAL ONCE
Status: COMPLETED | OUTPATIENT
Start: 2020-06-03 | End: 2020-06-03

## 2020-06-03 RX ORDER — NALOXONE HYDROCHLORIDE 1 MG/ML
2 INJECTION INTRAMUSCULAR; INTRAVENOUS; SUBCUTANEOUS ONCE
Qty: 2 ML | Refills: 0 | Status: SHIPPED | OUTPATIENT
Start: 2020-06-03 | End: 2020-06-03 | Stop reason: SDUPTHER

## 2020-06-03 RX ORDER — NALOXONE HYDROCHLORIDE 1 MG/ML
2 INJECTION INTRAMUSCULAR; INTRAVENOUS; SUBCUTANEOUS ONCE
Qty: 2 ML | Refills: 0 | Status: SHIPPED | OUTPATIENT
Start: 2020-06-03 | End: 2020-06-03

## 2020-07-24 ENCOUNTER — HOSPITAL ENCOUNTER (EMERGENCY)
Facility: HOSPITAL | Age: 35
Discharge: HOME | End: 2020-07-24
Attending: EMERGENCY MEDICINE | Admitting: EMERGENCY MEDICINE
Payer: COMMERCIAL

## 2020-07-24 VITALS
HEART RATE: 58 BPM | SYSTOLIC BLOOD PRESSURE: 113 MMHG | RESPIRATION RATE: 16 BRPM | OXYGEN SATURATION: 96 % | DIASTOLIC BLOOD PRESSURE: 54 MMHG

## 2020-07-24 DIAGNOSIS — T40.1X1A HEROIN OVERDOSE (HCC): Primary | ICD-10-CM

## 2020-07-24 PROCEDURE — 99284 EMERGENCY DEPT VISIT MOD MDM: CPT

## 2020-07-24 PROCEDURE — 93005 ELECTROCARDIOGRAM TRACING: CPT

## 2020-07-24 PROCEDURE — 99285 EMERGENCY DEPT VISIT HI MDM: CPT | Performed by: EMERGENCY MEDICINE

## 2020-07-24 RX ORDER — NALOXONE HYDROCHLORIDE 1 MG/ML
2 INJECTION PARENTERAL ONCE
Status: COMPLETED | OUTPATIENT
Start: 2020-07-24 | End: 2020-07-24

## 2020-07-24 NOTE — ED ATTENDING ATTESTATION
7/24/2020  IAngella MD, saw and evaluated the patient  I have discussed the patient with the resident/non-physician practitioner and agree with the resident's/non-physician practitioner's findings, Plan of Care, and MDM as documented in the resident's/non-physician practitioner's note, except where noted  All available labs and Radiology studies were reviewed  I was present for key portions of any procedure(s) performed by the resident/non-physician practitioner and I was immediately available to provide assistance  At this point I agree with the current assessment done in the Emergency Department  I have conducted an independent evaluation of this patient a history and physical is as follows:    ED Course     Patient presents for evaluation after using heroin today  Patient states that he believes he used a little bit too much  He denies any intentional homicidal or suicidal ideation  Patient was given 2 mg of Narcan intranasally prior to arrival   No complaints at this time  Exam: AAOx3, NAD, RRR, CTA, S/NT/ND, no motor/sensory deficits  A/P:  Accidental heroin overdose  Will observe in the emergency department to ensure that he does not have recurrence of symptoms when Narcan wears off      Critical Care Time  Procedures

## 2020-07-25 LAB
ATRIAL RATE: 70 BPM
ATRIAL RATE: 77 BPM
P AXIS: 71 DEGREES
P AXIS: 75 DEGREES
PR INTERVAL: 142 MS
PR INTERVAL: 144 MS
QRS AXIS: 64 DEGREES
QRS AXIS: 66 DEGREES
QRSD INTERVAL: 90 MS
QRSD INTERVAL: 94 MS
QT INTERVAL: 380 MS
QT INTERVAL: 400 MS
QTC INTERVAL: 430 MS
QTC INTERVAL: 432 MS
T WAVE AXIS: 10 DEGREES
T WAVE AXIS: 8 DEGREES
VENTRICULAR RATE: 70 BPM
VENTRICULAR RATE: 77 BPM

## 2020-07-25 PROCEDURE — 93010 ELECTROCARDIOGRAM REPORT: CPT | Performed by: INTERNAL MEDICINE

## 2020-07-25 NOTE — ED PROVIDER NOTES
History  Chief Complaint   Patient presents with    Overdose - Accidental     Pt presents via EMS s/p accidental heroin overdose  Pt states "I just used a little too much " Pt AxOx4 and sats stable at this time  HPI  81-year-old male presents to the ED after heroin overdose  EN route EMS gave him 2 mg Narcan via nasal route  He has not required any more Narcan and does not appear to be in acute withdrawal   Patient reports he accidentally took too much heroin today  He denies self-harm or suicide ideation  He denies any tremors, headache, spine pain, muscle aches, nausea, vomiting, abdominal pain, sweating  None       Past Medical History:   Diagnosis Date    Anxiety        History reviewed  No pertinent surgical history  Family History   Problem Relation Age of Onset    Diabetes Brother     Heart disease Maternal Grandmother      I have reviewed and agree with the history as documented  E-Cigarette/Vaping     E-Cigarette/Vaping Substances     Social History     Tobacco Use    Smoking status: Current Some Day Smoker    Smokeless tobacco: Never Used   Substance Use Topics    Alcohol use: Yes     Comment: social    Drug use: Yes     Types: Methamphetamines, Prescription, Heroin        Review of Systems   REVIEW OF SYSTEMS  Constitutional:  Denies fever, chills, fatigue or rash   HEENT:  Denies change in visual acuity  Denies nasal congestion or sore throat   Respiratory:  Denies cough or shortness of breath   Cardiovascular:  Denies chest pain or edema   GI:  Denies abdominal pain, nausea, vomiting, bloody stools or diarrhea   :  Denies dysuria, frequency, hesitancy      Musculoskeletal:  Denies back pain or joint pain   Neurologic:  Denies headache, focal weakness or sensory changes   Endocrine:  Denies polyuria or polydipsia   Lymphatic:  Denies swollen glands   Psychiatric:  Denies depression or anxiety       Physical Exam  ED Triage Vitals [07/24/20 1845]   Temp Pulse Respirations Blood Pressure SpO2   -- 62 14 108/60 96 %      Temp src Heart Rate Source Patient Position - Orthostatic VS BP Location FiO2 (%)   -- Monitor Lying Right arm --      Pain Score       --             Orthostatic Vital Signs  Vitals:    07/24/20 1845 07/24/20 1930   BP: 108/60 113/54   Pulse: 62 58   Patient Position - Orthostatic VS: Lying Lying       Physical Exam   PHYSICAL EXAM  Constitutional:  Well developed, well nourished, no acute distress, non-toxic appearance    HEENT:  Conjunctiva normal  Oropharynx moist  Respiratory:  No respiratory distress, normal breath sounds  Cardiovascular:  Normal rate, normal rhythm, no murmurs  GI:  Soft, nondistended, normal bowel sounds, nontender  :  No costovertebral angle tenderness   Musculoskeletal:  No edema, no tenderness, no deformities  Integument:  Well hydrated, no rash   Lymphatic:  No lymphadenopathy noted   Neurologic:  Alert & oriented, normal motor function, normal sensory function, no focal deficits noted   Psychiatric:  Speech and behavior appropriate     ED Medications  Medications   naloxone (FOR EMS ONLY) (NARCAN) 2 MG/2ML injection 4 mg (0 mg Does not apply Given to EMS 7/24/20 1941)       Diagnostic Studies  Results Reviewed     None                 No orders to display         Procedures  Procedures      ED Course       US AUDIT      Most Recent Value   Initial Alcohol Screen: US AUDIT-C    1  How often do you have a drink containing alcohol?  0 Filed at: 07/24/2020 1807   2  How many drinks containing alcohol do you have on a typical day you are drinking? 0 Filed at: 07/24/2020 1807   3a  Male UNDER 65: How often do you have five or more drinks on one occasion? 0 Filed at: 07/24/2020 1807   Audit-C Score  0 Filed at: 07/24/2020 1807                  EYAL/DAST-10      Most Recent Value   How many times in the past year have you    Used an illegal drug or used a prescription medication for non-medical reasons?   Never Filed at: 07/24/2020 1807 MDM  Number of Diagnoses or Management Options  Heroin overdose [facility]):   Diagnosis management comments: 71-year-old male presents to the ED after heroin overdose, EMS gave him 2 mg Narcan via nasal route  He has not required any more Narcan and does not appear to be in acute withdrawal   We clinically observed patient for few hours in the ED  We offered him crisis intervention and and encouraged him to seek a detox or inpatient programs  Patient declined  Patient was discharged in stable condition  Risk of Complications, Morbidity, and/or Mortality  Presenting problems: high  Diagnostic procedures: low  Management options: low    Patient Progress  Patient progress: improved        Disposition  Final diagnoses:   Heroin overdose ([address] )     Time reflects when diagnosis was documented in both MDM as applicable and the Disposition within this note     Time User Action Codes Description Comment    7/24/2020  8:23 PM Surinder Gary Add [T40 1X1A] Heroin overdose [facility])       ED Disposition     ED Disposition Condition Date/Time Comment    Discharge Stable Fri Jul 24, 2020  8:23 PM [facility] discharge to home/self care  Follow-up Information    None         There are no discharge medications for this patient  No discharge procedures on file  PDMP Review     None           ED Provider  Attending physically available and evaluated [facility]  I managed the patient along with the ED Attending      Electronically Signed by         Ruth Simons MD  07/24/20 2030

## 2020-07-28 ENCOUNTER — HOSPITAL ENCOUNTER (EMERGENCY)
Facility: HOSPITAL | Age: 35
Discharge: HOME | End: 2020-07-28
Attending: EMERGENCY MEDICINE | Admitting: EMERGENCY MEDICINE

## 2020-07-28 VITALS
TEMPERATURE: 99.5 F | OXYGEN SATURATION: 96 % | RESPIRATION RATE: 17 BRPM | DIASTOLIC BLOOD PRESSURE: 66 MMHG | HEART RATE: 91 BPM | SYSTOLIC BLOOD PRESSURE: 115 MMHG

## 2020-07-28 DIAGNOSIS — T50.901A OVERDOSE: Primary | ICD-10-CM

## 2020-07-28 PROCEDURE — 99284 EMERGENCY DEPT VISIT MOD MDM: CPT

## 2020-07-28 PROCEDURE — 99282 EMERGENCY DEPT VISIT SF MDM: CPT | Performed by: EMERGENCY MEDICINE

## 2020-07-28 NOTE — ED ATTENDING ATTESTATION
7/28/2020  Makenna Forrest DO, saw and evaluated the patient  I have discussed the patient with the resident/non-physician practitioner and agree with the resident's/non-physician practitioner's findings, Plan of Care, and MDM as documented in the resident's/non-physician practitioner's note, except where noted  All available labs and Radiology studies were reviewed  I was present for key portions of any procedure(s) performed by the resident/non-physician practitioner and I was immediately available to provide assistance  At this point I agree with the current assessment done in the Emergency Department  I have conducted an independent evaluation of this patient a history and physical is as follows:    50-year-old male presents emergency department after being given naloxone for opioid intoxication  He reports snorting heroin and ingesting kratom as a tea  Past Medical History:   Diagnosis Date    Anxiety     Drug abuse ([address] )      History reviewed  No pertinent surgical history  /72 (BP Location: Left arm)   Pulse (!) 116   Temp 99 5 °F (37 5 °C) (Oral)   Resp 16   SpO2 97%   Patient is somnolent but alert oriented x4  No respiratory distress  Mildly tachycardic  Abdomen soft nontender  Multiple skin lesions on chest   Extremities normal range of motion  Patient does not know where the skin lesions came from  He denies any IV drug use or skin-popping  He denies any ingestions  The intramuscular naloxone was given pre-hospital 1 hour ago  Will monitor for a total of 2 hours  Anticipate he will be able to be discharged but additional oxycodone will be given for any repeat respiratory depression at that point he would be admitted          ED Course         Critical Care Time  Procedures

## 2020-07-28 NOTE — DISCHARGE INSTRUCTIONS
You were seen in the hospital for acute intoxication due to Kratom and heroin  The EMS providers gave you 4 mg of Narcan before you arrived to the hospital, and we observed you after  You should follow up with the doctor who prescribes you the Suboxone

## 2020-07-28 NOTE — ED PROVIDER NOTES
History  Chief Complaint   Patient presents with    Overdose - Intentional     pt used Kratom and suboxone tonight, ran from police then had "unresponsive" episode, given Narcan 4mg IM awake upon arrival, in police custody     Geovanna Howard is a 35 yo man presenting after acute intoxication with Kratom and heroin, brought in by police and EMS  He was given 4 mg Narcan IM when the police found him, and according to police he "woke right up " He says he snorted the heroin and drank the Kratom, reports that he did not consume any other recreational substances today  He was taking Soboxone until 3 days ago, and states this episode of heroin usage was a slip up   He has no other complaints at the time of exam  He has no headache, vision changes, nausea, vomiting, chest pain, palpitations, SOB, cough, fevers, chills, sweats, diarrhea, incontinence, weakness, numbness  He has no obvious injuries  None       Past Medical History:   Diagnosis Date    Anxiety     Drug abuse ([facility] [address] )        History reviewed  No pertinent surgical history  Family History   Problem Relation Age of Onset    Diabetes Brother     Heart disease Maternal Grandmother      I have reviewed and agree with the history as documented  E-Cigarette/Vaping     E-Cigarette/Vaping Substances     Social History     Tobacco Use    Smoking status: Current Some Day Smoker    Smokeless tobacco: Never Used   Substance Use Topics    Alcohol use: Yes     Comment: social    Drug use: Yes     Types: Methamphetamines, Prescription, Heroin        Review of Systems   Constitutional: Negative for chills, diaphoresis and fever  HENT: Negative for congestion  Eyes: Negative for visual disturbance  Respiratory: Negative for cough, chest tightness, shortness of breath and wheezing  Cardiovascular: Negative for chest pain and palpitations  Gastrointestinal: Negative for abdominal distention, abdominal pain, diarrhea, nausea and vomiting  Genitourinary: Negative for difficulty urinating and dysuria  Musculoskeletal: Negative for arthralgias and myalgias  Neurological: Negative for dizziness, seizures, syncope, weakness, light-headedness, numbness and headaches  Psychiatric/Behavioral: Negative for agitation and confusion  All other systems reviewed and are negative  Physical Exam  ED Triage Vitals [07/28/20 0200]   Temperature Pulse Respirations Blood Pressure SpO2   99 5 °F (37 5 °C) (!) 116 16 134/72 97 %      Temp Source Heart Rate Source Patient Position - Orthostatic VS BP Location FiO2 (%)   Oral Monitor Lying Left arm --      Pain Score       --             Orthostatic Vital Signs  Vitals:    07/28/20 0200 07/28/20 0335   BP: 134/72 115/66   Pulse: (!) 116 91   Patient Position - Orthostatic VS: Lying Lying       Physical Exam   Constitutional: He is oriented to person, place, and time  No distress  HENT:   Head: Normocephalic and atraumatic  Eyes: Pupils are equal, round, and reactive to light  EOM are normal  No scleral icterus  Neck: Normal range of motion  Neck supple  Cardiovascular: Normal heart sounds and intact distal pulses  No murmur heard  Tachycardia, regular rhythm  Pulmonary/Chest: Effort normal and breath sounds normal  No respiratory distress  He has no wheezes  Abdominal: Soft  He exhibits no distension  There is no tenderness  Musculoskeletal: Normal range of motion  He exhibits no edema, tenderness or deformity  Neurological: He is alert and oriented to person, place, and time  No cranial nerve deficit or sensory deficit  He exhibits normal muscle tone  Normal strength and sensation in upper and lower extremities bilaterally  Skin: He is not diaphoretic  Numerous scabbed over wounds on patient's chest wall, cannot recall what they are from  No pus, non-tender  Erythema surrounds wounds  Psychiatric: He has a normal mood and affect   His speech is normal and behavior is normal  Judgment and thought content normal  He is not actively hallucinating  He is attentive  ED Medications  Medications - No data to display    Diagnostic Studies  Results Reviewed     None                 No orders to display         Procedures  Procedures      ED Course                                           MDM  Number of Diagnoses or Management Options  Overdose:   Diagnosis management comments: Patient found to be acutely intoxicated by police and EMS, given 4 mg IM Narcan en route  No other injuries appear to be present at the time of presentation  Patient has no other complaints  Will observe patient for 2 hours after IM Narcan  Patient ambulating and breathing normally after over 2 hours of observation  Respiratory rate is 15, oxygen saturation is 98%  Patient states that he can walk home from the hospital, it is about a 1 mile walk  Patient discharged  Instructed to follow up with Suboxone prescribing provider  Disposition  Final diagnoses:   Overdose     Time reflects when diagnosis was documented in both MDM as applicable and the Disposition within this note     Time User Action Codes Description Comment    7/28/2020  4:19 AM Lidia Hernandez 1615 Overdose       ED Disposition     ED Disposition Condition Date/Time Comment    Discharge Stable Tue Jul 28, 2020  4:18 AM Holly Portal discharge to home/self care  Follow-up Information     Follow up With Specialties Details Why Contact Info Additional [address] Emergency Department Emergency Medicine  If symptoms worsen [address]  [phone] [facility], [address] [place] [address], [address] [phone]          There are no discharge medications for this patient  No discharge procedures on file  PDMP Review     None           ED Provider  Attending physically available and evaluated Holly Portal   I managed the patient along with the ED Attending      Electronically Signed by         Lsysa Roblero MD  07/28/20 8354

## 2020-10-05 ENCOUNTER — OFFICE VISIT (OUTPATIENT)
Dept: URGENT CARE | Age: 35
End: 2020-10-05
Payer: COMMERCIAL

## 2020-10-05 VITALS
SYSTOLIC BLOOD PRESSURE: 108 MMHG | TEMPERATURE: 98.7 F | BODY MASS INDEX: 25.86 KG/M2 | RESPIRATION RATE: 18 BRPM | DIASTOLIC BLOOD PRESSURE: 67 MMHG | WEIGHT: 174.6 LBS | OXYGEN SATURATION: 98 % | HEIGHT: 69 IN

## 2020-10-05 DIAGNOSIS — L02.415 CUTANEOUS ABSCESS OF RIGHT KNEE: Primary | ICD-10-CM

## 2020-10-05 PROCEDURE — G0382 LEV 3 HOSP TYPE B ED VISIT: HCPCS | Performed by: PHYSICIAN ASSISTANT

## 2020-10-05 PROCEDURE — 99203 OFFICE O/P NEW LOW 30 MIN: CPT | Performed by: PHYSICIAN ASSISTANT

## 2020-10-05 PROCEDURE — 99283 EMERGENCY DEPT VISIT LOW MDM: CPT | Performed by: PHYSICIAN ASSISTANT

## 2020-10-05 RX ORDER — SULFAMETHOXAZOLE AND TRIMETHOPRIM 800; 160 MG/1; MG/1
1 TABLET ORAL EVERY 12 HOURS SCHEDULED
Qty: 20 TABLET | Refills: 0 | Status: SHIPPED | OUTPATIENT
Start: 2020-10-05 | End: 2020-10-15

## 2021-02-15 DIAGNOSIS — L02.415 CUTANEOUS ABSCESS OF RIGHT KNEE: ICD-10-CM

## 2021-02-16 RX ORDER — SULFAMETHOXAZOLE AND TRIMETHOPRIM 800; 160 MG/1; MG/1
TABLET ORAL
Qty: 20 TABLET | Refills: 0 | OUTPATIENT
Start: 2021-02-16

## 2021-12-06 ENCOUNTER — HOSPITAL ENCOUNTER (EMERGENCY)
Facility: HOSPITAL | Age: 36
Discharge: HOME | End: 2021-12-06
Attending: EMERGENCY MEDICINE
Payer: COMMERCIAL

## 2021-12-06 VITALS
RESPIRATION RATE: 20 BRPM | SYSTOLIC BLOOD PRESSURE: 122 MMHG | DIASTOLIC BLOOD PRESSURE: 69 MMHG | HEART RATE: 116 BPM | OXYGEN SATURATION: 99 % | TEMPERATURE: 98 F

## 2021-12-06 DIAGNOSIS — Z00.8 MEDICAL CLEARANCE FOR INCARCERATION: ICD-10-CM

## 2021-12-06 DIAGNOSIS — F19.10 SUBSTANCE ABUSE (HCC): Primary | ICD-10-CM

## 2021-12-06 PROCEDURE — 99283 EMERGENCY DEPT VISIT LOW MDM: CPT

## 2021-12-06 PROCEDURE — 99284 EMERGENCY DEPT VISIT MOD MDM: CPT | Performed by: STUDENT IN AN ORGANIZED HEALTH CARE EDUCATION/TRAINING PROGRAM

## 2022-01-01 PROCEDURE — 99283 EMERGENCY DEPT VISIT LOW MDM: CPT

## 2022-01-02 ENCOUNTER — HOSPITAL ENCOUNTER (EMERGENCY)
Facility: HOSPITAL | Age: 37
Discharge: HOME | End: 2022-01-02
Attending: EMERGENCY MEDICINE | Admitting: EMERGENCY MEDICINE
Payer: COMMERCIAL

## 2022-01-02 VITALS
BODY MASS INDEX: 24.34 KG/M2 | RESPIRATION RATE: 18 BRPM | OXYGEN SATURATION: 98 % | SYSTOLIC BLOOD PRESSURE: 103 MMHG | HEART RATE: 83 BPM | TEMPERATURE: 98.5 F | HEIGHT: 70 IN | DIASTOLIC BLOOD PRESSURE: 82 MMHG | WEIGHT: 170 LBS

## 2022-01-02 DIAGNOSIS — S61.219A FINGER LACERATION: ICD-10-CM

## 2022-01-02 DIAGNOSIS — L03.113 CELLULITIS OF RIGHT UPPER EXTREMITY: Primary | ICD-10-CM

## 2022-01-02 LAB
ALBUMIN SERPL BCP-MCNC: 3.1 G/DL (ref 3.5–5)
ALP SERPL-CCNC: 95 U/L (ref 46–116)
ALT SERPL W P-5'-P-CCNC: 45 U/L (ref 12–78)
ANION GAP SERPL CALCULATED.3IONS-SCNC: 4 MMOL/L (ref 4–13)
AST SERPL W P-5'-P-CCNC: 42 U/L (ref 5–45)
ATRIAL RATE: 73 BPM
BASOPHILS # BLD AUTO: 0.07 THOUSANDS/ΜL (ref 0–0.1)
BASOPHILS NFR BLD AUTO: 1 % (ref 0–1)
BILIRUB SERPL-MCNC: 0.39 MG/DL (ref 0.2–1)
BUN SERPL-MCNC: 12 MG/DL (ref 5–25)
CALCIUM ALBUM COR SERPL-MCNC: 10 MG/DL (ref 8.3–10.1)
CALCIUM SERPL-MCNC: 9.3 MG/DL (ref 8.3–10.1)
CHLORIDE SERPL-SCNC: 100 MMOL/L (ref 100–108)
CO2 SERPL-SCNC: 32 MMOL/L (ref 21–32)
CREAT SERPL-MCNC: 1.02 MG/DL (ref 0.6–1.3)
EOSINOPHIL # BLD AUTO: 0.28 THOUSAND/ΜL (ref 0–0.61)
EOSINOPHIL NFR BLD AUTO: 3 % (ref 0–6)
ERYTHROCYTE [DISTWIDTH] IN BLOOD BY AUTOMATED COUNT: 12.4 % (ref 11.6–15.1)
FLUAV RNA RESP QL NAA+PROBE: NEGATIVE
FLUBV RNA RESP QL NAA+PROBE: NEGATIVE
GFR SERPL CREATININE-BSD FRML MDRD: 94 ML/MIN/1.73SQ M
GLUCOSE SERPL-MCNC: 94 MG/DL (ref 65–140)
HCT VFR BLD AUTO: 34.5 % (ref 36.5–49.3)
HGB BLD-MCNC: 11.5 G/DL (ref 12–17)
IMM GRANULOCYTES # BLD AUTO: 0.04 THOUSAND/UL (ref 0–0.2)
IMM GRANULOCYTES NFR BLD AUTO: 0 % (ref 0–2)
LYMPHOCYTES # BLD AUTO: 1.32 THOUSANDS/ΜL (ref 0.6–4.47)
LYMPHOCYTES NFR BLD AUTO: 13 % (ref 14–44)
MCH RBC QN AUTO: 29.4 PG (ref 26.8–34.3)
MCHC RBC AUTO-ENTMCNC: 33.3 G/DL (ref 31.4–37.4)
MCV RBC AUTO: 88 FL (ref 82–98)
MONOCYTES # BLD AUTO: 0.91 THOUSAND/ΜL (ref 0.17–1.22)
MONOCYTES NFR BLD AUTO: 9 % (ref 4–12)
NEUTROPHILS # BLD AUTO: 7.82 THOUSANDS/ΜL (ref 1.85–7.62)
NEUTS SEG NFR BLD AUTO: 74 % (ref 43–75)
NRBC BLD AUTO-RTO: 0 /100 WBCS
P AXIS: 66 DEGREES
PLATELET # BLD AUTO: 489 THOUSANDS/UL (ref 149–390)
PMV BLD AUTO: 8.9 FL (ref 8.9–12.7)
POTASSIUM SERPL-SCNC: 4.1 MMOL/L (ref 3.5–5.3)
PR INTERVAL: 128 MS
PROT SERPL-MCNC: 7.3 G/DL (ref 6.4–8.2)
QRS AXIS: 64 DEGREES
QRSD INTERVAL: 86 MS
QT INTERVAL: 368 MS
QTC INTERVAL: 405 MS
RBC # BLD AUTO: 3.91 MILLION/UL (ref 3.88–5.62)
RSV RNA RESP QL NAA+PROBE: NEGATIVE
SARS-COV-2 RNA RESP QL NAA+PROBE: POSITIVE
SODIUM SERPL-SCNC: 136 MMOL/L (ref 136–145)
T WAVE AXIS: -1 DEGREES
VENTRICULAR RATE: 73 BPM
WBC # BLD AUTO: 10.44 THOUSAND/UL (ref 4.31–10.16)

## 2022-01-02 PROCEDURE — 85025 COMPLETE CBC W/AUTO DIFF WBC: CPT

## 2022-01-02 PROCEDURE — 0241U HB NFCT DS VIR RESP RNA 4 TRGT: CPT

## 2022-01-02 PROCEDURE — 96360 HYDRATION IV INFUSION INIT: CPT

## 2022-01-02 PROCEDURE — 93005 ELECTROCARDIOGRAM TRACING: CPT

## 2022-01-02 PROCEDURE — 36415 COLL VENOUS BLD VENIPUNCTURE: CPT

## 2022-01-02 PROCEDURE — 93010 ELECTROCARDIOGRAM REPORT: CPT | Performed by: INTERNAL MEDICINE

## 2022-01-02 PROCEDURE — 99284 EMERGENCY DEPT VISIT MOD MDM: CPT | Performed by: EMERGENCY MEDICINE

## 2022-01-02 PROCEDURE — 80053 COMPREHEN METABOLIC PANEL: CPT

## 2022-01-02 PROCEDURE — 87040 BLOOD CULTURE FOR BACTERIA: CPT

## 2022-01-02 RX ORDER — SULFAMETHOXAZOLE AND TRIMETHOPRIM 800; 160 MG/1; MG/1
1 TABLET ORAL 2 TIMES DAILY
Qty: 28 TABLET | Refills: 0 | Status: SHIPPED | OUTPATIENT
Start: 2022-01-02 | End: 2022-01-16

## 2022-01-02 RX ORDER — SULFAMETHOXAZOLE AND TRIMETHOPRIM 800; 160 MG/1; MG/1
1 TABLET ORAL ONCE
Status: COMPLETED | OUTPATIENT
Start: 2022-01-02 | End: 2022-01-02

## 2022-01-02 RX ADMIN — SODIUM CHLORIDE 1000 ML: 0.9 INJECTION, SOLUTION INTRAVENOUS at 03:52

## 2022-01-02 RX ADMIN — SULFAMETHOXAZOLE AND TRIMETHOPRIM 1 TABLET: 800; 160 TABLET ORAL at 05:35

## 2022-01-02 NOTE — DISCHARGE INSTRUCTIONS
Your labs today do not show any signs of severe, elevated WBC  Wound cultures and blood cultures are pending and results will be available  Use soap and water a few times a day to clean the wound  No peroxide  Take antibiotic as prescribed  Make sure to complete antibiotic course  Please follow up with wound care clinic as directed, as well as your family doctor  Return to the ED if you develop fever, chills, inability to move the R arm, chest pain, shortness of breath, pus discharge from the wound  Take Tylenol and Motrin at home for pain control  Refrain from abusing drugs  Please consider rehab for drug abuse even though you are not interested in pursuing this today

## 2022-01-02 NOTE — ED ATTENDING ATTESTATION
1/1/2022  Elvin Frances DO, saw and evaluated the patient  I have discussed the patient with the resident/non-physician practitioner and agree with the resident's/non-physician practitioner's findings, Plan of Care, and MDM as documented in the resident's/non-physician practitioner's note, except where noted  All available labs and Radiology studies were reviewed  I was present for key portions of any procedure(s) performed by the resident/non-physician practitioner and I was immediately available to provide assistance  At this point I agree with the current assessment done in the Emergency Department  I have conducted an independent evaluation of this patient a history and physical is as follows:    28 yo male presents for evaluation of laceration to L index fingertip which occurred about 2 days ago after he was cleaning out his car and cut it on a razor blade  He seems to have gone to [facility] to have it addressed and they were going to close it but he couldn't tolerate the pain during irrigation and didn't want to stay for digital block or closure  Dr there offered him skin closure using glue but pt declined  He states pain is severe now, non radiating, worse with palpation  No other a/e factors  Denies fever  Additionally, he c/o "MRSA infection" in his R shoulder  Shirt removed revealing a large area of infected tissue with central necrosis and purulent discharge  He denies trauma to the area, states it doesn't hurt much  He has FROM of R shoulder  There is no fluctuance around the area  There is a large area of surrounding erythema  He claims this has been here for 3 days and started as a "pimple" that he popped      L index finger:            R shoulder:          Imp: plan:        ED Course         Critical Care Time  Procedures

## 2022-01-02 NOTE — ED PROVIDER NOTES
History  Chief Complaint   Patient presents with    Wound Check     Pt c/o cut to his left index finger on a blade and also what he believes is a MRSA infection on his right shoulder  Patient is a 29year old male with hx of drug and substance abuse including heroin, crystal meth, presenting to the ED for evaluation of L index finger laceration sustained 2 days ago, as well as an infection of his R shoulder  Two days ago, patient states he was cleaning out his car and accidentally sliced his L index finger (fingerpad) on a razor blade that was inside the car  Patient presented to ED at [address] [place] [address] [facility] and received tetanus shot  While physician was preparing to suture the wound, patient would not tolerate numbing with lidocaine and then expressed desire to leave the ED/would not let physician continue  Patient was discharged  He continues to complain of pain in the L index finger  Regarding his R shoulder, patient states he popped a boil on top of his R shoulder 3 days ago  Since then, patient notes increased pain, swelling, redness, and skin changes at the site, with pain on movement of the R arm  Patient denies fevers, chills, inability to move the R shoulder, headache, dizziness, numbness and tingling, abdominal pain, nausea, vomiting, diarrhea  Patient admits to using crystal meth earlier in the evening as well as snorting heroin at 6pm yesterday  None       Past Medical History:   Diagnosis Date    Anxiety     [facility]     [facility]        History reviewed  No pertinent surgical history  Family History   Problem Relation Age of Onset    Diabetes Brother     Heart disease Maternal Grandmother      I have reviewed and agree with the history as documented      E-Cigarette/Vaping     E-Cigarette/Vaping Substances     Social History     Tobacco Use    Smoking status: Current Some Day Smoker     Types: Cigarettes    Smokeless tobacco: Never Used    Tobacco comment: occationally   Substance Use Topics    Alcohol use: Yes     Comment: social    Drug use: Yes     Types: Methamphetamines, Prescription, Heroin     Comment: "Last used 16 hours ago"        Review of Systems   Constitutional: Negative for chills and fever  HENT: Negative for congestion, ear pain, postnasal drip and sore throat  Eyes: Negative for pain and visual disturbance  Respiratory: Negative for cough and shortness of breath  Cardiovascular: Negative for chest pain and palpitations  Gastrointestinal: Negative for abdominal pain, diarrhea, nausea and vomiting  Genitourinary: Negative for difficulty urinating, dysuria, flank pain, hematuria and urgency  Musculoskeletal: Negative for arthralgias, back pain, myalgias and neck pain  Skin: Positive for wound (R anterior shoulder, fingerpad of L index finger)  Negative for color change and rash  Neurological: Negative for dizziness, seizures, syncope and headaches  All other systems reviewed and are negative  Physical Exam  ED Triage Vitals [01/02/22 0018]   Temperature Pulse Respirations Blood Pressure SpO2   98 5 °F (36 9 °C) 94 18 103/82 94 %      Temp Source Heart Rate Source Patient Position - Orthostatic VS BP Location FiO2 (%)   Oral Monitor Sitting Left arm --      Pain Score       8             Orthostatic Vital Signs  Vitals:    01/02/22 0018 01/02/22 0412   BP: 103/82    Pulse: 94 83   Patient Position - Orthostatic VS: Sitting        Physical Exam  Vitals and nursing note reviewed  Constitutional:       General: He is not in acute distress  Appearance: Normal appearance  He is well-developed and normal weight  He is not ill-appearing or diaphoretic  Comments: Patient is alert and oriented, with spastic myoclonic jerks and restlessness  HENT:      Head: Normocephalic and atraumatic  Right Ear: External ear normal       Left Ear: External ear normal       Nose: Nose normal  No congestion        Mouth/Throat: Mouth: Mucous membranes are moist       Pharynx: Oropharynx is clear  Eyes:      General: No scleral icterus  Extraocular Movements: Extraocular movements intact  Conjunctiva/sclera: Conjunctivae normal       Pupils: Pupils are equal, round, and reactive to light  Cardiovascular:      Rate and Rhythm: Normal rate and regular rhythm  Pulses: Normal pulses  Heart sounds: Normal heart sounds  No murmur heard  Pulmonary:      Effort: Pulmonary effort is normal  No respiratory distress  Breath sounds: Normal breath sounds  No wheezing, rhonchi or rales  Abdominal:      General: Abdomen is flat  Bowel sounds are normal       Palpations: Abdomen is soft  Tenderness: There is no abdominal tenderness  Comments: Multiple areas of excoriation in various stages of healing consistent with skin picking   Musculoskeletal:         General: No deformity  Cervical back: Neck supple  No tenderness  Right lower leg: No edema  Left lower leg: No edema  Skin:     General: Skin is warm  Capillary Refill: Capillary refill takes less than 2 seconds  Comments: 2 cm healing laceration of the fingerpad of the L index finger, no cellulitic changes or evidence of infection    R anterior shoulder - (please see clinical images) - area of induration with central necrosis, fibrinous stranding without fluctuance  There is surrounding erythema  No active drainage or bleeding  Neurological:      General: No focal deficit present  Mental Status: He is alert and oriented to person, place, and time     Psychiatric:         Mood and Affect: Mood normal           ED Medications  Medications   sodium chloride 0 9 % bolus 1,000 mL (0 mL Intravenous Stopped 1/2/22 0517)   sulfamethoxazole-trimethoprim (BACTRIM DS) 800-160 mg per tablet 1 tablet (1 tablet Oral Given 1/2/22 9386)       Diagnostic Studies  Results Reviewed     Procedure Component Value Units Date/Time    Blood culture #1 [[id number]] Collected: 01/02/22 0352    Lab Status: Preliminary result Specimen: Blood from Arm, Left Updated: 01/02/22 0801     Blood Culture Received in Microbiology Lab  Culture in Progress  Blood culture #2 [[id number]] Collected: 01/02/22 0352    Lab Status: Preliminary result Specimen: Blood from Arm, Left Updated: 01/02/22 0801     Blood Culture Received in Microbiology Lab  Culture in Progress      COVID/FLU/RSV - 2 hour TAT [[id number]]  (Abnormal) Collected: 01/02/22 0352    Lab Status: Final result Specimen: Nares from Nose Updated: 01/02/22 0603     SARS-CoV-2 Positive     INFLUENZA A PCR Negative     INFLUENZA B PCR Negative     RSV PCR Negative    Narrative:           Comprehensive metabolic panel [[id number]]  (Abnormal) Collected: 01/02/22 0352    Lab Status: Final result Specimen: Blood from Arm, Left Updated: 01/02/22 0428     Sodium 136 mmol/L      Potassium 4 1 mmol/L      Chloride 100 mmol/L      CO2 32 mmol/L      ANION GAP 4 mmol/L      BUN 12 mg/dL      Creatinine 1 02 mg/dL      Glucose 94 mg/dL      Calcium 9 3 mg/dL      Corrected Calcium 10 0 mg/dL      AST 42 U/L      ALT 45 U/L      Alkaline Phosphatase 95 U/L      Total Protein 7 3 g/dL      Albumin 3 1 g/dL      Total Bilirubin 0 39 mg/dL      eGFR 94 ml/min/1 73sq m     Narrative:      [facility] guidelines for Chronic Kidney Disease (CKD):     Stage 1 with normal or high GFR (GFR > 90 mL/min/1 73 square meters)    Stage 2 Mild CKD (GFR = 60-89 mL/min/1 73 square meters)    Stage 3A Moderate CKD (GFR = 45-59 mL/min/1 73 square meters)    Stage 3B Moderate CKD (GFR = 30-44 mL/min/1 73 square meters)    Stage 4 Severe CKD (GFR = 15-29 mL/min/1 73 square meters)    Stage 5 End Stage CKD (GFR <15 mL/min/1 73 square meters)  Note: GFR calculation is accurate only with a steady state creatinine    CBC and differential [[id number]]  (Abnormal) Collected: 01/02/22 0352    Lab Status: Final result Specimen: Blood from Arm, Left Updated: 01/02/22 0404     WBC 10 44 Thousand/uL      RBC 3 91 Million/uL      Hemoglobin 11 5 g/dL      Hematocrit 34 5 %      MCV 88 fL      MCH 29 4 pg      MCHC 33 3 g/dL      RDW 12 4 %      MPV 8 9 fL      Platelets 248 Thousands/uL      nRBC 0 /100 WBCs      Neutrophils Relative 74 %      Immat GRANS % 0 %      Lymphocytes Relative 13 %      Monocytes Relative 9 %      Eosinophils Relative 3 %      Basophils Relative 1 %      Neutrophils Absolute 7 82 Thousands/µL      Immature Grans Absolute 0 04 Thousand/uL      Lymphocytes Absolute 1 32 Thousands/µL      Monocytes Absolute 0 91 Thousand/µL      Eosinophils Absolute 0 28 Thousand/µL      Basophils Absolute 0 07 Thousands/µL     Wound culture and Gram stain [[id number]]     Lab Status: No result Specimen: Wound                  No orders to display         Procedures  Procedures      ED Course        Labs drawn and IV access established  Will order blood culture, as well as basic labs for leukocytosis  Advised patient that cannot suture the wound on his finger as it is >24 hrs old  Does not appear to be infected at this time  His tendon function of the finger is intact  Discussed with patient that he may need admission if his labs are abnormal for treatment of the wound on his shoulder  COVID swab ordered for admission, though patient is asymptomatic for respiratory issues  Diagnostics reviewed  Case reviewed with Dr Nick Vargas, surgery, who advises following with wound clinic and treatment with Bactrim  Discussed plan with patient  He agrees to treatment with Bactrim x2 weeks and will follow up with wound clinic today, as well as his PMD  Patient given strict return precautions  After patient discharged, his COVID resulted positive   Patient notified via phone and given return precautions specific to COVID 19 infection, including severe shortness of breath, wheezing, chest pain, productive cough         MDM    Disposition  Final diagnoses:   Cellulitis of right upper extremity   Finger laceration     Time reflects when diagnosis was documented in both MDM as applicable and the Disposition within this note     Time User Action Codes Description Comment    1/2/2022  5:24 AM Suzette Heimlich Add [Q81 [id number]] Cellulitis of right upper extremity     1/2/2022  5:24 AM Suzette Heimlich Add [[id number]] Finger laceration     1/2/2022  5:27 AM Suzette Heimlich Modify [B72 [id number]] Cellulitis of right upper extremity     1/2/2022  5:27 AM Suzette Heimlich Modify [X05 [id number]] Finger laceration       ED Disposition     ED Disposition Condition Date/Time Comment    Discharge Stable Sun Jan 2, 2022  5:24 AM Tina Oliveros discharge to home/self care  Follow-up Information     Follow up With Specialties Details Why Contact Info Additional Information    Anamaria Sharpe MD Family Medicine Schedule an appointment as soon as possible for a visit   [address]  [address] [facility] Wound Care Schedule an appointment as soon as possible for a visit   [address] [place] [address] [address] [place] [address], [address] [place] [address]          Discharge Medication List as of 1/2/2022  5:27 AM      START taking these medications    Details   sulfamethoxazole-trimethoprim (BACTRIM DS) 800-160 mg per tablet Take 1 tablet by mouth 2 (two) times a day for 14 days smx-tmp DS (BACTRIM) 800-160 mg tabs (1tab q12 D10), Starting Sun 1/2/2022, Until Sun 1/16/2022, Normal           No discharge procedures on file  PDMP Review     None           ED Provider  Attending physically available and evaluated Holly Portal  I managed the patient along with the ED Attending      Electronically Signed by         Thao Newberry DO  01/02/22 2242

## 2022-01-07 LAB
BACTERIA BLD CULT: NORMAL
BACTERIA BLD CULT: NORMAL

## 2022-08-12 ENCOUNTER — HOSPITAL ENCOUNTER (EMERGENCY)
Facility: HOSPITAL | Age: 37
Discharge: HOME | End: 2022-08-13
Attending: EMERGENCY MEDICINE
Payer: COMMERCIAL

## 2022-08-12 VITALS
OXYGEN SATURATION: 94 % | TEMPERATURE: 98.3 F | HEIGHT: 70 IN | SYSTOLIC BLOOD PRESSURE: 122 MMHG | BODY MASS INDEX: 24.34 KG/M2 | WEIGHT: 170 LBS | RESPIRATION RATE: 14 BRPM | DIASTOLIC BLOOD PRESSURE: 70 MMHG | HEART RATE: 70 BPM

## 2022-08-12 DIAGNOSIS — T50.901A ACCIDENTAL OVERDOSE, INITIAL ENCOUNTER: Primary | ICD-10-CM

## 2022-08-12 PROCEDURE — 99284 EMERGENCY DEPT VISIT MOD MDM: CPT

## 2022-08-12 PROCEDURE — 99282 EMERGENCY DEPT VISIT SF MDM: CPT | Performed by: EMERGENCY MEDICINE

## 2022-08-12 PROCEDURE — 93005 ELECTROCARDIOGRAM TRACING: CPT

## 2022-08-12 RX ORDER — NALOXONE HYDROCHLORIDE 1 MG/ML
INJECTION INTRAMUSCULAR; INTRAVENOUS; SUBCUTANEOUS
Status: DISCONTINUED
Start: 2022-08-12 | End: 2022-08-12 | Stop reason: WASHOUT

## 2022-08-12 RX ORDER — NALOXONE HYDROCHLORIDE 0.4 MG/ML
INJECTION, SOLUTION INTRAMUSCULAR; INTRAVENOUS; SUBCUTANEOUS
Status: DISCONTINUED
Start: 2022-08-12 | End: 2022-08-13 | Stop reason: HOSPADM

## 2022-08-12 RX ADMIN — NALOXONE HYDROCHLORIDE 4 MG: 4 SPRAY NASAL at 22:01

## 2022-08-12 NOTE — ED PROVIDER NOTES
History  Chief Complaint   Patient presents with    Overdose - Accidental     Pt brought in by EMS and police  Police state patient was running in and out of traffic and praying to a bush  Pt admits to using kratom  Patient is a 81-year-old male, past medical history of drug abuse, who presents to the emergency department with police after ingesting kratom  Per police, patient was found wandering on the side of the road  When they approached him, he began praying to a hsu and was acting strange  EMS was subsequently called, and patient was brought to the emergency department for further evaluation  Patient admits to drinking kratom  He states he took it as a pre workout supplement  He denies any other drug use  He denies any shortness of breath, chest pain, nausea vomiting, abdominal pain, or any other new or concerning symptoms  He has no other complaints at this time  Of note, patient is very sleepy and requires repetitive verbal stimuli to keep awake  Further history limited secondary to this  History provided by:  Patient and police  History limited by:  Mental status change   used: No        None       Past Medical History:   Diagnosis Date    Anxiety     [facility]     [facility] abuse ([facility] )        History reviewed  No pertinent surgical history  Family History   Problem Relation Age of Onset    Diabetes Brother     Heart disease Maternal Grandmother      I have reviewed and agree with the history as documented      E-Cigarette/Vaping     E-Cigarette/Vaping Substances     Social History     Tobacco Use    Smoking status: Current Some Day Smoker     Types: Cigarettes    Smokeless tobacco: Never Used    Tobacco comment: occationally   Substance Use Topics    Alcohol use: Yes     Comment: social    Drug use: Yes     Types: Methamphetamines, Prescription, Heroin     Comment: "Last used 16 hours ago"        Review of Systems   Unable to perform ROS: Mental status change       Physical Exam  ED Triage Vitals [08/12/22 1951]   Temperature Pulse Respirations Blood Pressure SpO2   98 3 °F (36 8 °C) 80 14 111/59 94 %      Temp Source Heart Rate Source Patient Position - Orthostatic VS BP Location FiO2 (%)   Oral Monitor Lying Right arm --      Pain Score       --             Orthostatic Vital Signs  Vitals:    08/12/22 1951 08/12/22 2157   BP: 111/59 122/70   Pulse: 80 70   Patient Position - Orthostatic VS: Lying Lying       Physical Exam  Vitals and nursing note reviewed  Constitutional:       General: He is not in acute distress  Appearance: He is well-developed  He is not diaphoretic  Comments: Patient very sleepy and will fall asleep while speaking to him  Arouses to verbal stimuli  No obvious external signs of trauma  HENT:      Head: Normocephalic and atraumatic  Right Ear: External ear normal       Left Ear: External ear normal       Nose: Nose normal    Eyes:      General: Lids are normal  No scleral icterus  Pupils:      Right eye: Pupil is round  Left eye: Pupil is round  Comments: Pinpoint pupils bilaterally  Cardiovascular:      Rate and Rhythm: Normal rate and regular rhythm  Heart sounds: Normal heart sounds  No murmur heard  No friction rub  No gallop  Pulmonary:      Effort: Bradypnea present  No respiratory distress  Breath sounds: Normal breath sounds  No wheezing or rales  Abdominal:      Palpations: Abdomen is soft  Tenderness: There is no abdominal tenderness  There is no guarding or rebound  Musculoskeletal:         General: No deformity  Normal range of motion  Cervical back: Normal range of motion and neck supple  Right lower leg: No edema  Left lower leg: No edema  Skin:     General: Skin is warm and dry  Neurological:      General: No focal deficit present  Cranial Nerves: No facial asymmetry  Comments: Patient very sleepy but arousable    No obvious focal neurologic deficits  ED Medications  Medications   naloxone nasal- Given to patient by provider at discharge  (NARCAN) 4 mg/0 1 mL nasal spray 4 mg (4 mg Does not apply Given by Other 8/12/22 2201)       Diagnostic Studies  Results Reviewed     None                 No orders to display         Procedures  Procedures      ED Course  ED Course as of 08/13/22 1315   Fri Aug 12, 2022   2100 Patient signed out to Dr Gail Chatman  Plan to observe until awake  Currently, patient is requiring 2 L nasal cannula  No Narcan has been administered at this point  Remains easily arousable to verbal stimuli  SBIRT 22yo+    Flowsheet Row Most Recent Value   SBIRT (25 yo +)    In order to provide better care to our patients, we are screening all of our patients for alcohol and drug use  Would it be okay to ask you these screening questions? No Filed at: 08/12/2022 2019                [facility]  Number of Diagnoses or Management Options  Accidental overdose, initial encounter  Diagnosis management comments: Patient is a 29 y o  male who presents to the ED after ingesting kratom  Patient with no complaints, however very sleepy with bradypnea on exam     Clinical impression is kratom ingestion  Will plan to observe for several hours until awake  Will give Narcan as needed  Supplemental O2 p r n  Portions of the record may have been created with voice recognition software  Occasional wrong word or "sound a like" substitutions may have occurred due to the inherent limitations of voice recognition software  Read the chart carefully and recognize, using context, where substitutions have occurred        Risk of Complications, Morbidity, and/or Mortality  Presenting problems: high    Patient Progress  Patient progress: stable      Disposition  Final diagnoses:   Accidental overdose, initial encounter     Time reflects when diagnosis was documented in both MDM as applicable and the Disposition within this note     Time User Action Codes Description Comment    8/13/2022  1:21 AM Rogelio Ash Add [T50 208U] Accidental overdose, initial encounter       ED Disposition     ED Disposition   Discharge    Condition   Stable    Date/Time   Sat Aug 13, 2022  1:21 AM    Comment   Luis Oliveros discharge to home/self care  Follow-up Information     Follow up With Specialties Details Why Contact Info Additional Information    [facility] Toxicology Medication-Assisted Treatment [facility] Toxicology Call   [address] [facility] [address]  [facility] Toxicology Medication-Assisted Treatment [address], [facility] [address], [place], [place], [address], [phone]          There are no discharge medications for this patient  PDMP Review     None           ED Provider  Attending physically available and evaluated Jenny Leblanc I managed the patient along with the ED Attending      Electronically Signed by         Lemuel Fox DO  08/13/22 3606

## 2022-08-13 NOTE — DISCHARGE INSTRUCTIONS
Thank you for coming to the ED for your care  We have requested the suboxone clinic give you a call for restarting this medication  The number is also provided  Please return to the ED with any further concerns

## 2022-08-13 NOTE — ED NOTES
Patient is awake and ambulated to the bathroom with no assistance        Eugenia Viramontes RN  08/13/22 7255

## 2022-08-14 LAB
ATRIAL RATE: 75 BPM
P AXIS: 62 DEGREES
PR INTERVAL: 134 MS
QRS AXIS: 50 DEGREES
QRSD INTERVAL: 88 MS
QT INTERVAL: 366 MS
QTC INTERVAL: 408 MS
T WAVE AXIS: 12 DEGREES
VENTRICULAR RATE: 75 BPM

## 2022-08-14 PROCEDURE — 93010 ELECTROCARDIOGRAM REPORT: CPT | Performed by: INTERNAL MEDICINE

## 2022-08-15 NOTE — ED ATTENDING ATTESTATION
8/12/2022  IHarriet MD, saw and evaluated the patient  I have discussed the patient with the resident/non-physician practitioner and agree with the resident's/non-physician practitioner's findings, Plan of Care, and MDM as documented in the resident's/non-physician practitioner's note, except where noted  All available labs and Radiology studies were reviewed  I was present for key portions of any procedure(s) performed by the resident/non-physician practitioner and I was immediately available to provide assistance  At this point I agree with the current assessment done in the Emergency Department  I have conducted an independent evaluation of this patient a history and physical is as follows:    Agree with resident history and exam      Drug use (reports Kratom only), sleepy on exam, though maintaining airway and awakens to verbal stimuli  Plan for observation until clinically sober      ED Course         Critical Care Time  Procedures

## 2022-11-22 ENCOUNTER — HOSPITAL ENCOUNTER (EMERGENCY)
Facility: HOSPITAL | Age: 37
Discharge: LEFT BEFORE DISCHARGE | End: 2022-11-22
Attending: EMERGENCY MEDICINE

## 2022-11-22 VITALS
OXYGEN SATURATION: 99 % | RESPIRATION RATE: 18 BRPM | BODY MASS INDEX: 24.84 KG/M2 | TEMPERATURE: 97.7 F | DIASTOLIC BLOOD PRESSURE: 86 MMHG | WEIGHT: 170.64 LBS | HEART RATE: 116 BPM | SYSTOLIC BLOOD PRESSURE: 135 MMHG

## 2022-11-22 DIAGNOSIS — F11.10 OPIOID ABUSE (HCC): Primary | ICD-10-CM

## 2022-11-22 LAB
AMPHETAMINES SERPL QL SCN: POSITIVE
BARBITURATES UR QL: NEGATIVE
BENZODIAZ UR QL: NEGATIVE
COCAINE UR QL: POSITIVE
ETHANOL EXG-MCNC: 0 MG/DL
METHADONE UR QL: NEGATIVE
OPIATES UR QL SCN: NEGATIVE
OXYCODONE+OXYMORPHONE UR QL SCN: NEGATIVE
PCP UR QL: NEGATIVE
SARS-COV-2 RNA RESP QL NAA+PROBE: NEGATIVE
THC UR QL: NEGATIVE

## 2022-11-22 NOTE — ED PROVIDER NOTES
History  No chief complaint on file  30 yo male with a history of anxiety and polysubstance abuse presents to the ED requesting placement in a rehab facility  The patient says he has been snorting 5-6 bags or fentanyl daily since June  He says he is "tired of using" and would like to go to rehab  Last use today around noon --> he snorted 2 bags  He denies any co-ingestions  The patient was on Suboxone earlier this year but stopped taking it in June prior to his relapse  No SI, HI, of hallucinations  No current signs of withdrawal  No other specific complaints  None       Past Medical History:   Diagnosis Date   • Anxiety    • Drug abuse ([facility] )    • Substance abuse ([facility] )        History reviewed  No pertinent surgical history  Family History   Problem Relation Age of Onset   • Diabetes Brother    • Heart disease Maternal Grandmother      I have reviewed and agree with the history as documented  E-Cigarette/Vaping     E-Cigarette/Vaping Substances     Social History     Tobacco Use   • Smoking status: Every Day     Packs/day: 0 25     Types: Cigarettes   • Smokeless tobacco: Never   • Tobacco comments:     occationally   Substance Use Topics   • Alcohol use: Not Currently     Comment: social   • Drug use: Yes     Types: Methamphetamines, Fentanyl     Comment: last used Fentanyl ~2hrs ago; Meth 0300       Review of Systems   Constitutional: Negative for chills and fever  HENT: Negative for sore throat  Respiratory: Negative for cough and shortness of breath  Cardiovascular: Negative for chest pain and palpitations  Gastrointestinal: Negative for abdominal pain, diarrhea, nausea and vomiting  Endocrine: Negative for cold intolerance and heat intolerance  Genitourinary: Negative for dysuria and flank pain  Musculoskeletal: Negative for back pain  Skin: Negative for rash  Allergic/Immunologic: Negative for immunocompromised state  Neurological: Negative for headaches     Hematological: Negative for adenopathy  Psychiatric/Behavioral: Negative for suicidal ideas  The patient is not nervous/anxious  Physical Exam  Physical Exam  Constitutional:       General: He is not in acute distress  Appearance: He is well-developed and well-nourished  HENT:      Head: Normocephalic and atraumatic  Eyes:      Extraocular Movements: EOM normal       Pupils: Pupils are equal, round, and reactive to light  Cardiovascular:      Rate and Rhythm: Regular rhythm  Tachycardia present  Pulmonary:      Effort: Pulmonary effort is normal  No respiratory distress  Breath sounds: Normal breath sounds  Abdominal:      General: There is no distension  Palpations: Abdomen is soft  Tenderness: There is no abdominal tenderness  Musculoskeletal:         General: No edema  Normal range of motion  Cervical back: Normal range of motion and neck supple  Skin:     General: Skin is warm and dry  Neurological:      Mental Status: He is alert and oriented to person, place, and time     Psychiatric:         Mood and Affect: Mood and affect normal          Vital Signs  ED Triage Vitals [11/22/22 1530]   Temperature Pulse Respirations Blood Pressure SpO2   97 7 °F (36 5 °C) (!) 116 18 135/86 99 %      Temp Source Heart Rate Source Patient Position - Orthostatic VS BP Location FiO2 (%)   Oral Monitor Sitting Left arm --      Pain Score       --           Vitals:    11/22/22 1530   BP: 135/86   Pulse: (!) 116   Patient Position - Orthostatic VS: Sitting         Visual Acuity      ED Medications  Medications - No data to display    Diagnostic Studies  Results Reviewed     Procedure Component Value Units Date/Time    Rapid drug screen, urine [[id number]]  (Abnormal) Collected: 11/22/22 1637    Lab Status: Final result Specimen: Urine, Clean Catch Updated: 11/22/22 6756     Amph/Meth UR Positive     Barbiturate Ur Negative     Benzodiazepine Urine Negative     Cocaine Urine Positive     Methadone Urine Negative     Opiate Urine Negative     PCP Ur Negative     THC Urine Negative     Oxycodone Urine Negative    Narrative:      Presumptive report  If requested, specimen will be sent to reference lab for confirmation  FOR MEDICAL PURPOSES ONLY  IF CONFIRMATION NEEDED PLEASE CONTACT THE LAB WITHIN 5 DAYS  Drug Screen Cutoff Levels:  AMPHETAMINE/METHAMPHETAMINES  1000 ng/mL  BARBITURATES     200 ng/mL  BENZODIAZEPINES     200 ng/mL  COCAINE      300 ng/mL  METHADONE      300 ng/mL  OPIATES      300 ng/mL  PHENCYCLIDINE     25 ng/mL  THC       50 ng/mL  OXYCODONE      100 ng/mL    COVID only [[id number]]  (Normal) Collected: 11/22/22 1637    Lab Status: Final result Specimen: Nares from Nose Updated: 11/22/22 1719     SARS-CoV-2 Negative    Narrative:      FOR PEDIATRIC PATIENTS - copy/paste COVID Guidelines URL to browser: [email or web address]    SARS-CoV-2 assay is a Nucleic Acid Amplification assay intended for the  qualitative detection of nucleic acid from SARS-CoV-2 in nasopharyngeal  swabs  Results are for the presumptive identification of SARS-CoV-2 RNA  Positive results are indicative of infection with SARS-CoV-2, the virus  causing COVID-19, but do not rule out bacterial infection or co-infection  with other viruses  Laboratories within the [place] and its  territories are required to report all positive results to the appropriate  public health authorities  Negative results do not preclude SARS-CoV-2  infection and should not be used as the sole basis for treatment or other  patient management decisions  Negative results must be combined with  clinical observations, patient history, and epidemiological information  This test has not been FDA cleared or approved  This test has been authorized by FDA under an Emergency Use Authorization  (EUA)   This test is only authorized for the duration of time the  declaration that circumstances exist justifying the authorization of the  emergency use of an in vitro diagnostic tests for detection of SARS-CoV-2  virus and/or diagnosis of COVID-19 infection under section 564(b)(1) of  the Act, 21 U  S C  563WLO-4(V)(1), unless the authorization is terminated  or revoked sooner  The test has been validated but independent review by FDA  and CLIA is pending  Test performed using [employer] Gene[employer]: This RT-PCR assay targets N2,  a region unique to SARS-CoV-2  A conserved region in the E-gene was chosen  for pan-Sarbecovirus detection which includes SARS-CoV-2  According to CMS-2020-01-R, this platform meets the definition of high-throughput technology  POCT alcohol breath test [[id number]]  (Normal) Resulted: 11/22/22 1640    Lab Status: Final result Updated: 11/22/22 1640     EXTBreath Alcohol 0 0                 No orders to display              Procedures  Procedures         ED Course                                             MDM  Number of Diagnoses or Management Options  Opioid abuse ([facility] )  Diagnosis management comments: The patient is well appearing with stable vital signs and a benign exam  He has no current complaints and is just asking for help getting placed in an outpatient rehab facility  Case discussed with Crisis  Plan for UDS, breath alcohol test, COVID-19 swab, and HOST referral  Will continue to monitor in the ED  17:45 Unable to locate the patient in the ED  HOST called twice but RN could not find him  Security witnessed the patient leave the ED with a steady gait and all of his belongings         Amount and/or Complexity of Data Reviewed  Clinical lab tests: ordered and reviewed    Patient Progress  Patient progress: stable      Disposition  Final diagnoses:   Opioid abuse ([facility] )     Time reflects when diagnosis was documented in both MDM as applicable and the Disposition within this note     Time User Action Codes Description Comment    11/22/2022  4:18 PM Leann Porras [F11 10] Opioid abuse [facility])       ED Disposition     ED Disposition   Left from Room after Provider Exam    Condition   --    Date/Time   Tue Nov 22, 2022  5:48 PM    Comment   --         Follow-up Information    None         Patient's Medications    No medications on file       No discharge procedures on file      PDMP Review     None          ED Provider  Electronically Signed by           Yamileth Wick MD  11/22/22 0706

## 2022-11-23 ENCOUNTER — HOSPITAL ENCOUNTER (INPATIENT)
Facility: HOSPITAL | Age: 37
LOS: 3 days | Discharge: HOME | End: 2022-11-26
Attending: EMERGENCY MEDICINE | Admitting: EMERGENCY MEDICINE

## 2022-11-23 DIAGNOSIS — F11.93 OPIOID WITHDRAWAL (HCC): ICD-10-CM

## 2022-11-23 DIAGNOSIS — F11.20 OPIOID USE DISORDER, SEVERE, DEPENDENCE (HCC): ICD-10-CM

## 2022-11-23 DIAGNOSIS — F15.10 METHAMPHETAMINE ABUSE (HCC): ICD-10-CM

## 2022-11-23 DIAGNOSIS — F11.10 HEROIN ABUSE (HCC): Primary | ICD-10-CM

## 2022-11-23 DIAGNOSIS — R31.29 MICROSCOPIC HEMATURIA: ICD-10-CM

## 2022-11-23 PROBLEM — F15.20 METHAMPHETAMINE USE DISORDER, SEVERE (HCC): Status: ACTIVE | Noted: 2018-03-12

## 2022-11-23 LAB
ALBUMIN SERPL BCP-MCNC: 4.6 G/DL (ref 3.5–5)
ALP SERPL-CCNC: 112 U/L (ref 43–122)
ALT SERPL W P-5'-P-CCNC: 40 U/L
AMPHETAMINES SERPL QL SCN: POSITIVE
ANION GAP SERPL CALCULATED.3IONS-SCNC: 8 MMOL/L (ref 5–14)
AST SERPL W P-5'-P-CCNC: 42 U/L (ref 17–59)
BACTERIA UR QL AUTO: ABNORMAL /HPF
BARBITURATES UR QL: NEGATIVE
BASOPHILS # BLD AUTO: 0.09 THOUSANDS/ÂΜL (ref 0–0.1)
BASOPHILS NFR BLD AUTO: 1 % (ref 0–1)
BENZODIAZ UR QL: NEGATIVE
BILIRUB SERPL-MCNC: 1.65 MG/DL (ref 0.2–1)
BILIRUB UR QL STRIP: NEGATIVE
BUN SERPL-MCNC: 23 MG/DL (ref 5–25)
CALCIUM SERPL-MCNC: 10.4 MG/DL (ref 8.4–10.2)
CAOX CRY URNS QL MICRO: ABNORMAL /HPF
CHLORIDE SERPL-SCNC: 93 MMOL/L (ref 96–108)
CLARITY UR: ABNORMAL
CO2 SERPL-SCNC: 34 MMOL/L (ref 21–32)
COCAINE UR QL: POSITIVE
COLOR UR: YELLOW
CREAT SERPL-MCNC: 1.19 MG/DL (ref 0.7–1.5)
EOSINOPHIL # BLD AUTO: 0.05 THOUSAND/ÂΜL (ref 0–0.61)
EOSINOPHIL NFR BLD AUTO: 0 % (ref 0–6)
ERYTHROCYTE [DISTWIDTH] IN BLOOD BY AUTOMATED COUNT: 12.6 % (ref 11.6–15.1)
ETHANOL SERPL-MCNC: <10 MG/DL (ref 0–10)
FLUAV RNA RESP QL NAA+PROBE: NEGATIVE
FLUBV RNA RESP QL NAA+PROBE: NEGATIVE
GFR SERPL CREATININE-BSD FRML MDRD: 78 ML/MIN/1.73SQ M
GLUCOSE SERPL-MCNC: 118 MG/DL (ref 70–99)
GLUCOSE UR STRIP-MCNC: NEGATIVE MG/DL
HCT VFR BLD AUTO: 39.9 % (ref 36.5–49.3)
HGB BLD-MCNC: 12.6 G/DL (ref 12–17)
HGB UR QL STRIP.AUTO: 10
IMM GRANULOCYTES # BLD AUTO: 0.08 THOUSAND/UL (ref 0–0.2)
IMM GRANULOCYTES NFR BLD AUTO: 0 % (ref 0–2)
KETONES UR STRIP-MCNC: ABNORMAL MG/DL
LEUKOCYTE ESTERASE UR QL STRIP: 25
LYMPHOCYTES # BLD AUTO: 1.98 THOUSANDS/ÂΜL (ref 0.6–4.47)
LYMPHOCYTES NFR BLD AUTO: 11 % (ref 14–44)
MAGNESIUM SERPL-MCNC: 1.8 MG/DL (ref 1.6–2.3)
MCH RBC QN AUTO: 28 PG (ref 26.8–34.3)
MCHC RBC AUTO-ENTMCNC: 31.6 G/DL (ref 31.4–37.4)
MCV RBC AUTO: 89 FL (ref 82–98)
METHADONE UR QL: NEGATIVE
MONOCYTES # BLD AUTO: 1.09 THOUSAND/ÂΜL (ref 0.17–1.22)
MONOCYTES NFR BLD AUTO: 6 % (ref 4–12)
MUCOUS THREADS UR QL AUTO: ABNORMAL
NEUTROPHILS # BLD AUTO: 14.65 THOUSANDS/ÂΜL (ref 1.85–7.62)
NEUTS SEG NFR BLD AUTO: 82 % (ref 43–75)
NITRITE UR QL STRIP: NEGATIVE
NON-SQ EPI CELLS URNS QL MICRO: ABNORMAL /HPF
NRBC BLD AUTO-RTO: 0 /100 WBCS
OPIATES UR QL SCN: NEGATIVE
OXYCODONE+OXYMORPHONE UR QL SCN: NEGATIVE
PCP UR QL: NEGATIVE
PH UR STRIP.AUTO: 5 [PH]
PLATELET # BLD AUTO: 541 THOUSANDS/UL (ref 149–390)
PMV BLD AUTO: 8.9 FL (ref 8.9–12.7)
POTASSIUM SERPL-SCNC: 4.4 MMOL/L (ref 3.5–5.3)
PROT SERPL-MCNC: 9.7 G/DL (ref 6.4–8.4)
PROT UR STRIP-MCNC: ABNORMAL MG/DL
RBC # BLD AUTO: 4.5 MILLION/UL (ref 3.88–5.62)
RBC #/AREA URNS AUTO: ABNORMAL /HPF
RSV RNA RESP QL NAA+PROBE: NEGATIVE
SARS-COV-2 RNA RESP QL NAA+PROBE: NEGATIVE
SODIUM SERPL-SCNC: 135 MMOL/L (ref 135–147)
SP GR UR STRIP.AUTO: 1.02 (ref 1–1.04)
THC UR QL: NEGATIVE
UROBILINOGEN UA: 1 MG/DL
WBC # BLD AUTO: 17.94 THOUSAND/UL (ref 4.31–10.16)
WBC #/AREA URNS AUTO: ABNORMAL /HPF

## 2022-11-23 PROCEDURE — HZ2ZZZZ DETOXIFICATION SERVICES FOR SUBSTANCE ABUSE TREATMENT: ICD-10-PCS | Performed by: EMERGENCY MEDICINE

## 2022-11-23 RX ORDER — CLONAZEPAM 1 MG/1
2 TABLET ORAL EVERY 6 HOURS PRN
Status: DISCONTINUED | OUTPATIENT
Start: 2022-11-23 | End: 2022-11-26 | Stop reason: HOSPADM

## 2022-11-23 RX ORDER — NICOTINE 21 MG/24HR
1 PATCH, TRANSDERMAL 24 HOURS TRANSDERMAL DAILY
Status: DISCONTINUED | OUTPATIENT
Start: 2022-11-24 | End: 2022-11-26 | Stop reason: HOSPADM

## 2022-11-23 RX ORDER — BUPRENORPHINE 20 UG/H
20 PATCH TRANSDERMAL
Status: DISCONTINUED | OUTPATIENT
Start: 2022-11-23 | End: 2022-11-26 | Stop reason: HOSPADM

## 2022-11-23 RX ORDER — TRAZODONE HYDROCHLORIDE 50 MG/1
50 TABLET ORAL
Status: DISCONTINUED | OUTPATIENT
Start: 2022-11-23 | End: 2022-11-26 | Stop reason: HOSPADM

## 2022-11-23 RX ORDER — ACETAMINOPHEN 325 MG/1
650 TABLET ORAL EVERY 6 HOURS PRN
Status: DISCONTINUED | OUTPATIENT
Start: 2022-11-23 | End: 2022-11-26 | Stop reason: HOSPADM

## 2022-11-23 RX ORDER — ONDANSETRON 2 MG/ML
4 INJECTION INTRAMUSCULAR; INTRAVENOUS EVERY 6 HOURS PRN
Status: DISCONTINUED | OUTPATIENT
Start: 2022-11-23 | End: 2022-11-26 | Stop reason: HOSPADM

## 2022-11-23 RX ORDER — CLONIDINE HYDROCHLORIDE 0.1 MG/1
0.1 TABLET ORAL EVERY 6 HOURS PRN
Status: DISCONTINUED | OUTPATIENT
Start: 2022-11-23 | End: 2022-11-26 | Stop reason: HOSPADM

## 2022-11-23 RX ORDER — LORAZEPAM 1 MG/1
2 TABLET ORAL ONCE
Status: COMPLETED | OUTPATIENT
Start: 2022-11-23 | End: 2022-11-23

## 2022-11-23 RX ORDER — ENOXAPARIN SODIUM 100 MG/ML
40 INJECTION SUBCUTANEOUS DAILY
Status: DISCONTINUED | OUTPATIENT
Start: 2022-11-24 | End: 2022-11-26 | Stop reason: HOSPADM

## 2022-11-23 RX ORDER — GABAPENTIN 300 MG/1
300 CAPSULE ORAL EVERY 8 HOURS PRN
Status: DISCONTINUED | OUTPATIENT
Start: 2022-11-23 | End: 2022-11-26 | Stop reason: HOSPADM

## 2022-11-23 RX ADMIN — LORAZEPAM 2 MG: 1 TABLET ORAL at 20:57

## 2022-11-24 ENCOUNTER — APPOINTMENT (INPATIENT)
Dept: RADIOLOGY | Facility: HOSPITAL | Age: 37
End: 2022-11-24

## 2022-11-24 PROBLEM — F19.20 POLYSUBSTANCE DEPENDENCE INCLUDING OPIOID TYPE DRUG WITH COMPLICATION, CONTINUOUS USE (HCC): Status: ACTIVE | Noted: 2018-03-12

## 2022-11-24 PROBLEM — R89.9 ABNORMAL LABORATORY TEST RESULT: Status: ACTIVE | Noted: 2022-11-24

## 2022-11-24 PROBLEM — F11.93 OPIOID WITHDRAWAL (HCC): Status: ACTIVE | Noted: 2022-11-24

## 2022-11-24 PROBLEM — D72.829 LEUKOCYTOSIS: Status: RESOLVED | Noted: 2018-12-22 | Resolved: 2022-11-24

## 2022-11-24 LAB
ALBUMIN SERPL BCP-MCNC: 4 G/DL (ref 3.5–5)
ALP SERPL-CCNC: 89 U/L (ref 43–122)
ALT SERPL W P-5'-P-CCNC: 30 U/L
ANION GAP SERPL CALCULATED.3IONS-SCNC: 5 MMOL/L (ref 5–14)
AST SERPL W P-5'-P-CCNC: 33 U/L (ref 17–59)
ATRIAL RATE: 72 BPM
ATRIAL RATE: 80 BPM
BILIRUB SERPL-MCNC: 0.95 MG/DL (ref 0.2–1)
BUN SERPL-MCNC: 19 MG/DL (ref 5–25)
CALCIUM SERPL-MCNC: 9.4 MG/DL (ref 8.4–10.2)
CHLORIDE SERPL-SCNC: 95 MMOL/L (ref 96–108)
CO2 SERPL-SCNC: 34 MMOL/L (ref 21–32)
CREAT SERPL-MCNC: 0.95 MG/DL (ref 0.7–1.5)
ERYTHROCYTE [DISTWIDTH] IN BLOOD BY AUTOMATED COUNT: 12.4 % (ref 11.6–15.1)
GFR SERPL CREATININE-BSD FRML MDRD: 103 ML/MIN/1.73SQ M
GLUCOSE SERPL-MCNC: 112 MG/DL (ref 70–99)
HCT VFR BLD AUTO: 34.9 % (ref 36.5–49.3)
HGB BLD-MCNC: 10.9 G/DL (ref 12–17)
HIV 1+2 AB+HIV1 P24 AG SERPL QL IA: NORMAL
HIV1 P24 AG SER QL: NORMAL
MAGNESIUM SERPL-MCNC: 2 MG/DL (ref 1.6–2.3)
MCH RBC QN AUTO: 28 PG (ref 26.8–34.3)
MCHC RBC AUTO-ENTMCNC: 31.2 G/DL (ref 31.4–37.4)
MCV RBC AUTO: 90 FL (ref 82–98)
P AXIS: 119 DEGREES
P AXIS: 56 DEGREES
PLATELET # BLD AUTO: 461 THOUSANDS/UL (ref 149–390)
PMV BLD AUTO: 8.7 FL (ref 8.9–12.7)
POTASSIUM SERPL-SCNC: 4.1 MMOL/L (ref 3.5–5.3)
PR INTERVAL: 132 MS
PR INTERVAL: 134 MS
PROT SERPL-MCNC: 7.9 G/DL (ref 6.4–8.4)
QRS AXIS: 141 DEGREES
QRS AXIS: 49 DEGREES
QRSD INTERVAL: 86 MS
QRSD INTERVAL: 94 MS
QT INTERVAL: 400 MS
QT INTERVAL: 418 MS
QTC INTERVAL: 457 MS
QTC INTERVAL: 461 MS
RBC # BLD AUTO: 3.89 MILLION/UL (ref 3.88–5.62)
SODIUM SERPL-SCNC: 134 MMOL/L (ref 135–147)
T WAVE AXIS: 181 DEGREES
T WAVE AXIS: 23 DEGREES
VENTRICULAR RATE: 72 BPM
VENTRICULAR RATE: 80 BPM
WBC # BLD AUTO: 8.36 THOUSAND/UL (ref 4.31–10.16)

## 2022-11-24 RX ORDER — SODIUM CHLORIDE 9 MG/ML
100 INJECTION, SOLUTION INTRAVENOUS CONTINUOUS
Status: DISCONTINUED | OUTPATIENT
Start: 2022-11-24 | End: 2022-11-25

## 2022-11-24 RX ADMIN — CLONIDINE HYDROCHLORIDE 0.1 MG: 0.1 TABLET ORAL at 22:37

## 2022-11-24 RX ADMIN — CLONIDINE HYDROCHLORIDE 0.1 MG: 0.1 TABLET ORAL at 15:20

## 2022-11-24 RX ADMIN — TRAZODONE HYDROCHLORIDE 50 MG: 50 TABLET ORAL at 22:48

## 2022-11-24 RX ADMIN — SODIUM CHLORIDE 100 ML/HR: 0.9 INJECTION, SOLUTION INTRAVENOUS at 01:48

## 2022-11-24 RX ADMIN — MULTIPLE VITAMINS W/ MINERALS TAB 1 TABLET: TAB ORAL at 08:01

## 2022-11-24 RX ADMIN — ENOXAPARIN SODIUM 40 MG: 100 INJECTION SUBCUTANEOUS at 08:00

## 2022-11-24 RX ADMIN — GABAPENTIN 300 MG: 300 CAPSULE ORAL at 15:18

## 2022-11-24 RX ADMIN — BUPRENORPHINE 20 MCG: 20 PATCH, EXTENDED RELEASE TRANSDERMAL at 00:30

## 2022-11-24 RX ADMIN — GABAPENTIN 300 MG: 300 CAPSULE ORAL at 22:48

## 2022-11-24 RX ADMIN — NICOTINE 1 PATCH: 14 PATCH, EXTENDED RELEASE TRANSDERMAL at 08:01

## 2022-11-24 NOTE — H&P
HISTORY & PHYSICAL EXAM  DEPARTMENT OF MEDICAL TOXICOLOGY  LEVEL 4 MEDICAL DETOX UNIT  Poppy Meeks 29 y o  male MRN: [id number]  Unit/Bed#: 5T [facility] 508-01 Encounter: [id number]      Reason for Admission/Principal Problem: Opioid withdrawal, opioid use disorder  Admitting Provider: DARLENE Stafford  Attending Provider: Tutu Morin MD   11/23/2022  7:03 PM      * Opioid withdrawal [facility])  Assessment & Plan  Presents requesting assistance with buprenorphine induction   Reports last use 11/23 9am, however presentation consistent with recent use PTA  Currently not exhibiting symptoms of opioid withdrawal  Placed Butrans 20 mcg/hr patch with anticipated buprenorphine slow induction regimen 11/24 evening or when exhibiting moderate withdrawal symptoms   Symptomatic and supportive care  Continuous pulse oximetry monitoring    Opioid use disorder, severe, dependence ([facility] )  Assessment & Plan  Endorses longstanding history of opioid use disorder with co-occurring polysubstance use  Uses 8-10 daily via insufflation, denies h/o IVDU  Reports history of prior maintenance on Suboxone   Screen for hepatitis/HIV  Case management consulted for assistance with disposition planning - pt expresses interest in outpatient follow up       Polysubstance dependence including opioid type drug with complication, continuous use ([facility] )  Assessment & Plan  · Endorses almost daily use of methamphetamine and crack cocaine  · Reports ongoing insomnia and anxiety  Denies chest pain, palpitations  · EKG reviewed: 72 bpm, NSR, non specific ST segment changes in lateral leads   QTC 457ms   · Encourage cessation       Leukocytosis  Assessment & Plan  · WBC 17 94 upon arrival to ED   · Likely reactive to opioid withdrawal, multiple occurrences upon review of past admissions   · Afebrile, denies recent illness, fever, chills, cough, sob  · Negative FLU/RSV/COVID  · Will send UA w reflex, and CXR   · Continue to monitor CBC and symptoms    Tobacco War Room informed pt off Tele / Placed to MRI table and monitor as well as O2 Sat and O2 3L N/C / pt arrived on O2    dependence  Assessment & Plan  Pt reports daily tobacco use   Offer NRT   Encourage cessation    Anxiety  Assessment & Plan  · History of untreated anxiety and PTSD  · Co-occurring substance use and mental health disorder   · Currently unable to articulate his interest in pursuing treatment however would benefit from ongoing outpatient treatment   · Will discuss and encourage outpatient management          VTE Prophylaxis: Enoxaparin (Lovenox)  / sequential compression device   Code Status: Level 1      Anticipated Length of Stay:  Patient will be admitted on an Inpatient basis with an anticipated length of stay of  2  midnights  Justification for Hospital Stay: Medically monitored opioid withdrawal and buprenorphine induction     For any questions or concerns, please Tiger Text the advanced practitioner in the role of [facility] On Call      This patient qualifies for Level IV medically managed intensive inpatient services under the criteria set by the American Society of Addiction Medicine, including dimensions 1-3  The patient is in withdrawal (or is intoxicated with high risk of withdrawal), with severe and unstable medical and/or psychiatric (dual diagnosis) problems, requiring requires 24-hour medical and nursing care and the full resources of a [address] patient to medical detox unit and continue supportive care and stabilization of acute opioid withdrawal per medical toxicology/detox medication assisted treatment pathway  Monitor opioid severity via Clinical Opioid Withdrawal Scale (COWS) Q4 hours and administer buprenorphine/naloxone 8mg/2mg when COWS >8, or when greater than 24 hours have elapsed from most recent opioid use (excluding long-acting opioids, such as methadone)   Continue to monitor opioid severity Q30-60 minutes after first dose and administer additional buprenorphine 2-4mg every 30-60 minutes until COWS < 8 for two consecutive hours  (Max dose 32 mg)  Adjunctive medications administered as needed:  Clonidine 0 1 mg PO Q6 hours PRN anxiety or palpitations    Gabapentin 300mg PO Q8 hours PRN anxiety    Ibuprofen 600 mg PO Q6 hours PRN pain    Acetaminophen 1000mg PO Q8 hours PRN pain    Ondansetron 4 mg PO Q6 hours PRN N/V    Nicotine patch 7, 14, 21 mg  PRN nicotine withdrawal   Trazodone 50 mg PO QHS PRN sleep    Loperamide 4 mg PO PRN diarrhea up to 16 mg/day       The risks, benefits and mechanism of buprenorphine/naloxone were discussed and patient agreed to treatment  Case management consultation will take place to assist with coordination of subsequent treatment after discharge  Administer daily multivitamin  Evaluate and treat for coexisting substance use, such as nicotine  Discuss risk factors for infectious disease, such as history of intravenous drug abuse, and offer hepatitis and HIV screening if indicated  Hx and PE limited by: not limited, alert and oriented     HPI: Hue Gar is a 29y o  year old male who presents requesting assistance with opioid withdrawal and induction on to buprenorphine  He reports using 8-10 bags of heroin/fentanyl daily via insufflation and almost daily use of methamphetamine  He reports a past history of being on Suboxone maintenance for approximately 2 months in June of 2022 prior to returning to daily use  He denies any current withdrawal symptoms with exception to insomnia and anxiety  Opioids currently used: heroin and fentanyl  Route of use: insufflation  Date/Time of Last Opioid Use: 11/23 9AM  Current Signs/Symptoms of Opioid Withdrawal: restlessness and anxiety    COWS score:   Clinical Opiate Withdrawal Scale  Pulse: 83    Methadone & Buprenorphine History  History of prior treatment for opioid dependence? yes  Currently on Methadone Maintenance? no  History of prior treatment with Suboxone?  yes  Currently taking Suboxone? no  History of using Suboxone without having a prescription? yes  History of IVDA? no  Co-existing substance use? yes    Review of PDMP: yes    Social History     Substance and Sexual Activity   Alcohol Use Not Currently    Comment: social     Social History     Substance and Sexual Activity   Drug Use Yes   • Types: Methamphetamines, Fentanyl    Comment: last used Fentanyl ~2hrs ago; Meth 0300     Social History     Tobacco Use   Smoking Status Every Day   • Packs/day: 0 25   • Types: Cigarettes   Smokeless Tobacco Never   Tobacco Comments    occationally       Review of Systems   Constitutional: Negative for chills, diaphoresis, fatigue, fever and unexpected weight change  HENT: Negative for rhinorrhea  Respiratory: Negative for cough, shortness of breath and wheezing  Cardiovascular: Negative for chest pain and palpitations  Gastrointestinal: Negative for abdominal pain, diarrhea and nausea  Musculoskeletal: Negative for myalgias  Neurological: Negative for tremors and headaches  Psychiatric/Behavioral: Positive for sleep disturbance  Negative for agitation, behavioral problems, hallucinations and self-injury  The patient is nervous/anxious  Historical Information   Past Medical History:   Diagnosis Date   • Anxiety    • Drug abuse ([facility] )    • Substance abuse ([place] [address] )      History reviewed  No pertinent surgical history    Family History   Problem Relation Age of Onset   • Diabetes Brother    • Heart disease Maternal Grandmother      Social History   Marital Status: Single   Patient Pre-hospital Living Situation: stable  Patient Pre-hospital Level of Mobility: independent   Patient Pre-hospital Diet Restrictions: none    Allergies   Allergen Reactions   • Penicillins        Prior to Admission medications    Not on File       Current Facility-Administered Medications   Medication Dose Route Frequency   • acetaminophen (TYLENOL) tablet 650 mg  650 mg Oral Q6H PRN   • clonazePAM (KlonoPIN) tablet 2 mg  2 mg Oral Q6H PRN   • cloNIDine (CATAPRES) tablet 0 1 mg  0 1 mg Oral Q6H PRN   • enoxaparin (LOVENOX) subcutaneous injection 40 mg  40 mg Subcutaneous Daily   • gabapentin (NEURONTIN) capsule 300 mg  300 mg Oral Q8H PRN   • multivitamin-minerals (CENTRUM) tablet 1 tablet  1 tablet Oral Daily   • nicotine (NICODERM CQ) 14 mg/24hr TD 24 hr patch 1 patch  1 patch Transdermal Daily   • ondansetron (ZOFRAN) injection 4 mg  4 mg Intravenous Q6H PRN   • sodium chloride 0 9 % infusion  100 mL/hr Intravenous Continuous   • transdermal buprenorphine (BUTRANS) 20 mcg/hr TD patch 20 mcg  20 mcg Transdermal Q7 Days   • traZODone (DESYREL) tablet 50 mg  50 mg Oral HS PRN       Continuous Infusions:sodium chloride, 100 mL/hr, Last Rate: 100 mL/hr (11/24/22 0148)             Objective       Intake/Output Summary (Last 24 hours) at 11/24/2022 0543  Last data filed at 11/24/2022 0148  Gross per 24 hour   Intake 950 ml   Output --   Net 950 ml       Invasive Devices:   Peripheral IV 11/23/22 Right Antecubital (Active)       Vitals   Vitals:    11/23/22 1902 11/23/22 2225 11/23/22 2235   BP: 111/89 103/58 100/56   TempSrc: Oral  Temporal   Pulse: 89 77 83   Resp: 18 16 18   Patient Position - Orthostatic VS: Lying Sitting Sitting   Temp: 98 °F (36 7 °C)  97 9 °F (36 6 °C)       Physical Exam  Vitals and nursing note reviewed  Constitutional:       General: He is not in acute distress  Appearance: He is not ill-appearing or toxic-appearing  HENT:      Head: Normocephalic and atraumatic  Nose: No rhinorrhea  Eyes:      Extraocular Movements: Extraocular movements intact  Conjunctiva/sclera: Conjunctivae normal       Pupils: Pupils are equal, round, and reactive to light  Cardiovascular:      Rate and Rhythm: Normal rate and regular rhythm  Pulses: Normal pulses  Heart sounds: Normal heart sounds  No murmur heard  Pulmonary:      Effort: Pulmonary effort is normal  No respiratory distress        Breath sounds: Normal breath sounds  Abdominal:      General: Bowel sounds are normal       Palpations: Abdomen is soft  Tenderness: There is no abdominal tenderness  Musculoskeletal:         General: No swelling  Normal range of motion  Cervical back: Normal range of motion  Skin:     General: Skin is warm and dry  Psychiatric:         Attention and Perception: Perception normal  He is inattentive  He does not perceive auditory or visual hallucinations  Mood and Affect: Mood is anxious  Behavior: Behavior is not agitated  Behavior is cooperative  Thought Content: Thought content normal       Comments: - Delayed processing of thoughts prior to answering   - Exhibits impaired time perception          DATA    EKG, Pathology, and Other Studies: I have personally reviewed pertinent reports  EK bpm, NSR, non specific ST segment changes in lateral leads, QTC 457ms     Lab Results: I have personally reviewed pertinent reports          CBC ETOH     Lab Results   Component Value Date    WBC 17 94 (H) 2022    RBC 4 50 2022    HGB 12 6 2022    HCT 39 9 2022    MCV 89 2022    MCH 28 0 2022    MCHC 31 6 2022    RDW 12 6 2022     (H) 2022    MPV 8 9 2022      No results found for: LACTICACID   CMP UA         Component Value Date/Time    K 4 4 2022 2010    CL 93 (L) 2022 2010    CO2 34 (H) 2022 2010    BUN 23 2022 2010    CREATININE 1 19 2022 2010         Component Value Date/Time    CALCIUM 10 4 (H) 2022 2010    ALKPHOS 112 2022 2010    AST 42 2022 2010    ALT 40 2022 2010      Lab Results   Component Value Date    CLARITYU Slightly Cloudy (A) 2022    COLORU Yellow 2022    SPECGRAV 1 025 2022    PHUR 5 0 2022    GLUCOSEU Negative 2022    KETONESU 5 (Trace) (A) 2022    BLOODU 10 0 (A) 2022    PROTEIN UA 30 (1+) (A) 2022    NITRITE Negative 11/23/2022    BILIRUBINUR Negative 11/23/2022    UROBILINOGEN 1 0 11/23/2022    LEUKOCYTESUR 25 0 (A) 11/23/2022    WBCUA 2-4 11/23/2022    RBCUA None Seen 11/23/2022    BACTERIA Occasional 11/23/2022    EPIS Occasional 11/23/2022        Liver Function Test: ASA     Lab Results   Component Value Date    TBILI 1 65 (H) 11/23/2022    ALKPHOS 112 11/23/2022    AST 42 11/23/2022    ALT 40 11/23/2022    TP 9 7 (H) 11/23/2022    ALB 4 6 11/23/2022      Lab Results   Component Value Date    SALICYLATE <3 (L) 69/25/9887      Troponin APAP     Lab Results   Component Value Date    TROPONINI <0 02 04/03/2019      Lab Results   Component Value Date    ACTMNPHEN <2 (L) 12/22/2018      VBG HCG     No results found for: PHVEN, IRE1LBB, PO2VEN, NSA7HSM, BEVEN, H1ZIGYDLF, S0BGCXY   No results found for: HCGQUANT   ABG Urine Drug Screen     No results found for: PHART, DIH8RTC, PO2ART, RVH5ZTW, BEART, S3LQJRTEO, O2HGB, SOURC, LIBRADO, VTAC, ACRATE, INSPIREDAIR, PEEP   Lab Results   Component Value Date    AMPMETHUR Positive (A) 11/23/2022    BARBTUR Negative 11/23/2022    BDZUR Negative 11/23/2022    COCAINEUR Positive (A) 11/23/2022    METHADONEUR Negative 11/23/2022    OPIATEUR Negative 11/23/2022    PCPUR Negative 11/23/2022    THCUR Negative 11/23/2022    OXYCODONEUR Negative 11/23/2022      Lactate INR     No results found for: LACTICACID   No results found for: INR   PTT Protime     No results found for: PTT   No results found for: PROTIME   Hepatitis HIV     Lab Results   Component Value Date    HEPBSAG Non-reactive 03/19/2018    HEPCAB Non-reactive 03/19/2018      Lab Results   Component Value Date    [id number] Non-Reactive 03/19/2018    [id number] Non-Reactive 03/19/2018            Imaging Studies: I have personally reviewed pertinent reports  Counseling / Coordination of Care  Total floor / unit time spent today 35 minutes   Greater than 50% of total time was spent with the patient and / or family counseling and / or coordination of care  Minutes of critical care time NA  -Critical care time was exclusive of separately billable procedures and teaching time    -Critical care was necessary to treat or prevent imminent or life-threatening deterioration of the following condition: CNS failure/compromise, toxidrome (opioid withdrawal),    -Critical care time was spent personally by me on the following activities as well as the above as per the course and rest of chart: obtaining history from patient/surrogate, development of a treatment plan, discussions with referring provider(s), evaluation of patient's response to the treatment, examination of the patient, performing  treatments and interventions, re-evaluation of the patient's condition, review of old charts, ordering/interpreting laboratory studies, ordering/interpreting of radiographic studies  ** Please Note: This note has been constructed using a voice recognition system   **

## 2022-11-24 NOTE — ASSESSMENT & PLAN NOTE
· UA remarkable for multiple abnormalities that could bc c/w contamination, renal dysfunction especially in presence of sympathomimetic drug use, infection, malignancy, etc    · Continues to have no urinary symptoms  · Urine culture negative  · STD screen negative    · Encourage outpatient urology evaluation

## 2022-11-24 NOTE — ED NOTES
Pt given turkey sandwich, chips, apple sauce, jello and water        Ben Adams, MARION  11/23/22 1943

## 2022-11-24 NOTE — ED NOTES
Pt was observed by the RN rocking back and forth and pacing  This RN offered the pt something to help with their anxiety and the pt agreed       Rolan Cohen RN  11/23/22 2059

## 2022-11-24 NOTE — PLAN OF CARE
Problem: Potential for Falls  Goal: Patient will remain free of falls  Description: INTERVENTIONS:  - Educate patient/family on patient safety including physical limitations  - Instruct patient to call for assistance with activity   - Consult OT/PT to assist with strengthening/mobility   - Keep Call bell within reach  - Keep bed low and locked with side rails adjusted as appropriate  - Keep care items and personal belongings within reach  - Initiate and maintain comfort rounds  - Make Fall Risk Sign visible to staff  - Apply yellow socks and bracelet for high fall risk patients  - Consider moving patient to room near nurses station  Outcome: Progressing     Problem: SUBSTANCE USE/ABUSE  Goal: By discharge, will develop insight into their chemical dependency and sustain motivation to continue in recovery  Description: INTERVENTIONS:  - Attends all daily group sessions and scheduled AA groups  - Actively practices coping skills through participation in the therapeutic community and adherence to program rules  - Reviews and completes assignments from individual treatment plan  - Assist patient development of understanding of their personal cycle of addiction and relapse triggers  Outcome: Progressing  Goal: By discharge, patient will have ongoing treatment plan addressing chemical dependency  Description: INTERVENTIONS:  - Assist patient with resources and/or appointments for ongoing recovery based living  Outcome: Progressing     Problem: INFECTION - ADULT  Goal: Absence or prevention of progression during hospitalization  Description: INTERVENTIONS:  - Assess and monitor for signs and symptoms of infection  - Monitor lab/diagnostic results  - Monitor all insertion sites, i e  indwelling lines, tubes, and drains  - Monitor endotracheal if appropriate and nasal secretions for changes in amount and color  - [place] appropriate cooling/warming therapies per order  - Administer medications as ordered  - Instruct and encourage patient and family to use good hand hygiene technique  - Identify and instruct in appropriate isolation precautions for identified infection/condition  Outcome: Progressing  Goal: Absence of fever/infection during neutropenic period  Description: INTERVENTIONS:  - Monitor WBC    Outcome: Progressing

## 2022-11-24 NOTE — PLAN OF CARE
Problem: SUBSTANCE USE/ABUSE  Goal: By discharge, will develop insight into their chemical dependency and sustain motivation to continue in recovery  Description: INTERVENTIONS:  - Attends all daily group sessions and scheduled AA groups  - Actively practices coping skills through participation in the therapeutic community and adherence to program rules  - Reviews and completes assignments from individual treatment plan  - Assist patient development of understanding of their personal cycle of addiction and relapse triggers  Outcome: Progressing  Goal: By discharge, patient will have ongoing treatment plan addressing chemical dependency  Description: INTERVENTIONS:  - Assist patient with resources and/or appointments for ongoing recovery based living  Outcome: Progressing     Problem: SAFETY ADULT  Goal: Patient will remain free of falls  Description: INTERVENTIONS:  - Educate patient/family on patient safety including physical limitations  - Instruct patient to call for assistance with activity   - Consult OT/PT to assist with strengthening/mobility   - Keep Call bell within reach  - Keep bed low and locked with side rails adjusted as appropriate  - Keep care items and personal belongings within reach  - Initiate and maintain comfort rounds  - Make Fall Risk Sign visible to staff  - Offer Toileting every 2 Hours, in advance of need  - Apply yellow socks and bracelet for high fall risk patients  - Consider moving patient to room near nurses station  Outcome: Progressing  Goal: Maintain or return to baseline ADL function  Description: INTERVENTIONS:  -  Assess patient's ability to carry out ADLs; assess patient's baseline for ADL function and identify physical deficits which impact ability to perform ADLs (bathing, care of mouth/teeth, toileting, grooming, dressing, etc )  - Assess/evaluate cause of self-care deficits   - Assess range of motion  - Assess patient's mobility; develop plan if impaired  - Assess patient's need for assistive devices and provide as appropriate  - Encourage maximum independence but intervene and supervise when necessary  - Involve family in performance of ADLs  - Assess for home care needs following discharge   - Consider OT consult to assist with ADL evaluation and planning for discharge  - Provide patient education as appropriate  Outcome: Progressing  Goal: Maintains/Returns to pre admission functional level  Description: INTERVENTIONS:  - Perform BMAT or MOVE assessment daily    - Set and communicate daily mobility goal to care team and patient/family/caregiver     - Collaborate with rehabilitation services on mobility goals if consulted  - Out of bed for toileting  - Record patient progress and toleration of activity level   Outcome: Progressing

## 2022-11-24 NOTE — ASSESSMENT & PLAN NOTE
· WBC 17 94 upon arrival to ED, now resolved  · Likely reactive to opioid withdrawal, multiple occurrences upon review of past admissions   · Afebrile, denies recent illness, fever, chills, cough, sob  · Negative FLU/RSV/COVID  · CXR unremarkable for acute infection   · UA remarkable for multiple abnormalities that could bc c/w contamination, renal dysfunction especially in presence of sympathomimetic drug use, infection, malignancy, etc  Will send culture, screen for STD and refer to urology

## 2022-11-24 NOTE — ED PROVIDER NOTES
History  Chief Complaint   Patient presents with   • Detox Evaluation     Patient was here yesterday  Patient would like to detox from fentanyl  Last use 9am  Patient uses 5-10 bags a day  Patient is a 36-year-old male who presents requesting inpatient detox from fentanyl  Daily use, 5 bags/day  Snorts, no IV  Last use this AM   Also admits to smoking methamphetamines 3 days ago  Was here yesterday for same but left  Now back for detox  Denies any SI  Currently no withdrawal symptoms  Would be okay with suboxone  None       Past Medical History:   Diagnosis Date   • Anxiety    • Drug abuse ([facility] )    • Substance abuse ([facility] )        History reviewed  No pertinent surgical history  Family History   Problem Relation Age of Onset   • Diabetes Brother    • Heart disease Maternal Grandmother      I have reviewed and agree with the history as documented  E-Cigarette/Vaping     E-Cigarette/Vaping Substances     Social History     Tobacco Use   • Smoking status: Every Day     Packs/day: 0 25     Types: Cigarettes   • Smokeless tobacco: Never   • Tobacco comments:     occationally   Substance Use Topics   • Alcohol use: Not Currently     Comment: social   • Drug use: Yes     Types: Methamphetamines, Fentanyl     Comment: last used Fentanyl ~2hrs ago; Meth 0300       Review of Systems   Constitutional: Negative  HENT: Negative  Eyes: Negative  Respiratory: Negative  Cardiovascular: Negative  Gastrointestinal: Negative  Endocrine: Negative  Genitourinary: Negative  Musculoskeletal: Negative  Skin: Negative  Allergic/Immunologic: Negative  Neurological: Negative  Hematological: Negative  Psychiatric/Behavioral: Positive for agitation  All other systems reviewed and are negative  Physical Exam  Physical Exam  Vitals and nursing note reviewed  Constitutional:       Appearance: Normal appearance  HENT:      Head: Normocephalic and atraumatic  Cardiovascular:      Rate and Rhythm: Normal rate and regular rhythm  Pulses: Normal pulses  Heart sounds: Normal heart sounds  Pulmonary:      Effort: Pulmonary effort is normal       Breath sounds: Normal breath sounds  Musculoskeletal:         General: Normal range of motion  Cervical back: Normal range of motion and neck supple  Skin:     General: Skin is warm and dry  Neurological:      General: No focal deficit present  Mental Status: He is alert and oriented to person, place, and time  Psychiatric:         Attention and Perception: He is inattentive  Behavior: Behavior is agitated  Vital Signs  ED Triage Vitals [11/23/22 1902]   Temperature Pulse Respirations Blood Pressure SpO2   98 °F (36 7 °C) 89 18 111/89 95 %      Temp Source Heart Rate Source Patient Position - Orthostatic VS BP Location FiO2 (%)   Oral Monitor Lying Left arm --      Pain Score       --           Vitals:    11/23/22 1902 11/23/22 2225   BP: 111/89 103/58   Pulse: 89 77   Patient Position - Orthostatic VS: Lying Sitting         Visual Acuity      ED Medications  Medications   LORazepam (ATIVAN) tablet 2 mg (2 mg Oral Given 11/23/22 2057)       Diagnostic Studies  Results Reviewed     Procedure Component Value Units Date/Time    Rapid drug screen, urine [[id number]] Collected: 11/23/22 2204    Lab Status: In process Specimen: Urine, Other Updated: 11/23/22 2210    FLU/RSV/COVID - if FLU/RSV clinically relevant [[id number]]  (Normal) Collected: 11/23/22 2010    Lab Status: Final result Specimen: Nares from Nasopharyngeal Swab Updated: 11/23/22 2055     SARS-CoV-2 Negative     INFLUENZA A PCR Negative     INFLUENZA B PCR Negative     RSV PCR Negative    Narrative:      FOR PEDIATRIC PATIENTS - copy/paste COVID Guidelines URL to browser: [email or web address]    SARS-CoV-2 assay is a Nucleic Acid Amplification assay intended for the  qualitative detection of nucleic acid from SARS-CoV-2 in nasopharyngeal  swabs  Results are for the presumptive identification of SARS-CoV-2 RNA  Positive results are indicative of infection with SARS-CoV-2, the virus  causing COVID-19, but do not rule out bacterial infection or co-infection  with other viruses  Laboratories within the [place] and its  territories are required to report all positive results to the appropriate  public health authorities  Negative results do not preclude SARS-CoV-2  infection and should not be used as the sole basis for treatment or other  patient management decisions  Negative results must be combined with  clinical observations, patient history, and epidemiological information  This test has not been FDA cleared or approved  This test has been authorized by FDA under an Emergency Use Authorization  (EUA)  This test is only authorized for the duration of time the  declaration that circumstances exist justifying the authorization of the  emergency use of an in vitro diagnostic tests for detection of SARS-CoV-2  virus and/or diagnosis of COVID-19 infection under section 564(b)(1) of  the Act, 21 U  S C  939EDC-1(K)(4), unless the authorization is terminated  or revoked sooner  The test has been validated but independent review by FDA  and CLIA is pending  Test performed using [employer] GeneXpert: This RT-PCR assay targets N2,  a region unique to SARS-CoV-2  A conserved region in the E-gene was chosen  for pan-Sarbecovirus detection which includes SARS-CoV-2  According to CMS-2020-01-R, this platform meets the definition of high-throughput technology      Ethanol [[id number]]  (Normal) Collected: 11/23/22 2010    Lab Status: Final result Specimen: Blood from Arm, Right Updated: 11/23/22 2037     Ethanol Lvl <10 mg/dL     Magnesium [[id number]]  (Normal) Collected: 11/23/22 2010    Lab Status: Final result Specimen: Blood from Line, Venous Updated: 11/23/22 2037 Magnesium 1 8 mg/dL     Comprehensive metabolic panel [[id number]]  (Abnormal) Collected: 11/23/22 2010    Lab Status: Final result Specimen: Blood from Line, Venous Updated: 11/23/22 2037     Sodium 135 mmol/L      Potassium 4 4 mmol/L      Chloride 93 mmol/L      CO2 34 mmol/L      ANION GAP 8 mmol/L      BUN 23 mg/dL      Creatinine 1 19 mg/dL      Glucose 118 mg/dL      Calcium 10 4 mg/dL      AST 42 U/L      ALT 40 U/L      Alkaline Phosphatase 112 U/L      Total Protein 9 7 g/dL      Albumin 4 6 g/dL      Total Bilirubin 1 65 mg/dL      eGFR 78 ml/min/1 73sq m     Narrative:      [place] guidelines for Chronic Kidney Disease (CKD):   •  Stage 1 with normal or high GFR (GFR > 90 mL/min/1 73 square meters)  •  Stage 2 Mild CKD (GFR = 60-89 mL/min/1 73 square meters)  •  Stage 3A Moderate CKD (GFR = 45-59 mL/min/1 73 square meters)  •  Stage 3B Moderate CKD (GFR = 30-44 mL/min/1 73 square meters)  •  Stage 4 Severe CKD (GFR = 15-29 mL/min/1 73 square meters)  •  Stage 5 End Stage CKD (GFR <15 mL/min/1 73 square meters)  Note: GFR calculation is accurate only with a steady state creatinine    CBC and differential [[id number]]  (Abnormal) Collected: 11/23/22 2010    Lab Status: Final result Specimen: Blood from Line, Venous Updated: 11/23/22 2019     WBC 17 94 Thousand/uL      RBC 4 50 Million/uL      Hemoglobin 12 6 g/dL      Hematocrit 39 9 %      MCV 89 fL      MCH 28 0 pg      MCHC 31 6 g/dL      RDW 12 6 %      MPV 8 9 fL      Platelets 006 Thousands/uL      nRBC 0 /100 WBCs      Neutrophils Relative 82 %      Immat GRANS % 0 %      Lymphocytes Relative 11 %      Monocytes Relative 6 %      Eosinophils Relative 0 %      Basophils Relative 1 %      Neutrophils Absolute 14 65 Thousands/µL      Immature Grans Absolute 0 08 Thousand/uL      Lymphocytes Absolute 1 98 Thousands/µL      Monocytes Absolute 1 09 Thousand/µL      Eosinophils Absolute 0 05 Thousand/µL      Basophils Absolute 0 09 Thousands/µL                  No orders to display              Procedures  Procedures         ED Course  ED Course as of 11/23/22 2230   Wed Nov 23, 2022   2100 Accepted for detox by [facility] under Viviana Beck  SBIRT 20yo+    Flowsheet Row Most Recent Value   SBIRT (23 yo +)    In order to provide better care to our patients, we are screening all of our patients for alcohol and drug use  Would it be okay to ask you these screening questions? Yes Filed at: 11/23/2022 4931   Initial Alcohol Screen: US AUDIT-C     1  How often do you have a drink containing alcohol? 0 Filed at: 11/23/2022 1938   2  How many drinks containing alcohol do you have on a typical day you are drinking? 0 Filed at: 11/23/2022 1938   3a  Male UNDER 65: How often do you have five or more drinks on one occasion? 0 Filed at: 11/23/2022 1938   Audit-C Score 0 Filed at: 11/23/2022 1938   EYAL: How many times in the past year have you    Used an illegal drug or used a prescription medication for non-medical reasons? Daily or Almost Daily Filed at: 11/23/2022 1938   DAST-10: In the past 12 months       1  Have you used drugs other than those required for medical reasons? 1 Filed at: 11/23/2022 1938   2  Do you use more than one drug at a time? 1 Filed at: 11/23/2022 1938   3  Have you had medical problems as a result of your drug use (e g , memory loss, hepatitis, convulsions, bleeding, etc )? 0 Filed at: 11/23/2022 1938   4  Have you had "blackouts" or "flashbacks" as a result of drug use? YesNo 1 Filed at: 11/23/2022 1938   5  Do you ever feel bad or guilty about your drug use? 1 Filed at: 11/23/2022 1938   6  Does your spouse (or parent) ever complain about your involvement with drugs? 1 Filed at: 11/23/2022 1939   7  Have you neglected your family because of your use of drugs? 0 Filed at: 11/23/2022 1938   8  Have you engaged in illegal activities in order to obtain drugs? 0 Filed at: 11/23/2022 1938   9   Have you ever experienced withdrawal symptoms (felt sick) when you stopped taking drugs? 1 Filed at: 11/23/2022 1938   10  Are you always able to stop using drugs when you want to? 1 Filed at: 11/23/2022 1938   DAST-10 Score 7 Filed at: 11/23/2022 1938                    MDM  Number of Diagnoses or Management Options     Amount and/or Complexity of Data Reviewed  Clinical lab tests: ordered and reviewed  Tests in the medicine section of CPT®: ordered and reviewed  Review and summarize past medical records: yes  Discuss the patient with other providers: yes  Independent visualization of images, tracings, or specimens: yes        Disposition  Final diagnoses:   None     ED Disposition     ED Disposition   Admit    Condition   Stable    Date/Time   Wed Nov 23, 2022  9:00 PM    Comment   Case was discussed with [facility] and the patient's admission status was agreed to be Admission Status: observation status to the service of Dr Kellen Osborn   Follow-up Information    None         There are no discharge medications for this patient  No discharge procedures on file      PDMP Review     None          ED Provider  Electronically Signed by           Thuan Kiran MD  11/23/22 5940

## 2022-11-24 NOTE — ASSESSMENT & PLAN NOTE
Tolerated microinduction with transition to maintenance dosing    Symptomatic and supportive care  Continuous pulse oximetry monitoring

## 2022-11-24 NOTE — ASSESSMENT & PLAN NOTE
Endorses longstanding history of opioid use disorder with co-occurring polysubstance use  Uses 8-10 daily via insufflation, denies h/o IVDU  Reports history of prior maintenance on Suboxone   Hepatitis/HIV screening negative   Case management consulted for assistance with disposition planning - pt expresses interest in outpatient follow up  Referred to [facility]

## 2022-11-24 NOTE — ED PROCEDURE NOTE
PROCEDURE  ECG 12 Lead Documentation Only    Date/Time: 11/23/2022 8:13 PM  Performed by: Nikolay Solis MD  Authorized by: Nikolay Solis MD     Indications / Diagnosis:  Detox  ECG reviewed by me, the ED Provider: yes    Patient location:  ED  Interpretation:     Interpretation: normal    Rate:     ECG rate:  72    ECG rate assessment: normal    Rhythm:     Rhythm: sinus rhythm    Ectopy:     Ectopy: none    QRS:     QRS axis:  Normal    QRS intervals:  Normal  Conduction:     Conduction: normal    ST segments:     ST segments:  Normal  T waves:     T waves: normal           Nikolay Solis MD  11/23/22 2013

## 2022-11-24 NOTE — ASSESSMENT & PLAN NOTE
· History of untreated anxiety and PTSD  · Co-occurring substance use and mental health disorder   · Would benefit from ongoing outpatient treatment   · Encourage outpatient management

## 2022-11-24 NOTE — PROGRESS NOTES
PROGRESS NOTE  DEPARTMENT OF MEDICAL TOXICOLOGY  LEVEL 4 MEDICAL DETOX UNIT  Mike Fang 29 y o  male MRN: [id number]  Unit/Bed#: 5T DETOX 163-71 Encounter: [id number]      Reason for Admission/Principal Problem: opioid wd   Rounding Provider: Yamileth Rios DO  Attending Provider: Yamileth Rios DO   11/23/2022  7:03 PM           Abnormal laboratory test result  Assessment & Plan  UA sent for leukocytosis  UA remarkable for multiple abnormalities that could bc c/w contamination, renal dysfunction especially in presence of sympathomimetic drug use, infection, malignancy, etc  Will send culture, screen for STD and refer to urology  No urinary symptoms so will hold on antibiotics at this point  Opioid use disorder, severe, dependence ([facility] [facility] )  Assessment & Plan  Endorses longstanding history of opioid use disorder with co-occurring polysubstance use  Uses 8-10 daily via insufflation, denies h/o IVDU  Reports history of prior maintenance on Suboxone   Screen for hepatitis/HIV  Case management consulted for assistance with disposition planning - pt expresses interest in outpatient follow up       Tobacco dependence  Assessment & Plan  Pt reports daily tobacco use   Offer NRT   Encourage cessation    Polysubstance dependence including opioid type drug with complication, continuous use ([facility] [facility] )  Assessment & Plan  · Endorses almost daily use of methamphetamine and crack cocaine  · Reports ongoing insomnia and anxiety  Denies chest pain, palpitations  · EKG reviewed: 72 bpm, NSR, non specific ST segment changes in lateral leads   QTC 457ms   · Encourage cessation       Anxiety  Assessment & Plan  · History of untreated anxiety and PTSD  · Co-occurring substance use and mental health disorder   · Currently unable to articulate his interest in pursuing treatment however would benefit from ongoing outpatient treatment   · Will discuss and encourage outpatient management     * Opioid withdrawal [facility])  Assessment & Plan  Presents requesting assistance with buprenorphine induction   Reports last use 11/23 9am, however presentation consistent with recent use PTA  Currently not exhibiting symptoms of opioid withdrawal  Placed Butrans 20 mcg/hr patch with anticipated buprenorphine slow induction regimen 11/24 evening or when exhibiting moderate withdrawal symptoms   Symptomatic and supportive care  Likely test dose tomorrow   Continuous pulse oximetry monitoring    Leukocytosis-resolved as of 11/24/2022  Assessment & Plan  · WBC 17 94 upon arrival to ED, now resolved  · Likely reactive to opioid withdrawal, multiple occurrences upon review of past admissions   · Afebrile, denies recent illness, fever, chills, cough, sob  · Negative FLU/RSV/COVID  · CXR unremarkable for acute infection   · UA remarkable for multiple abnormalities that could bc c/w contamination, renal dysfunction especially in presence of sympathomimetic drug use, infection, malignancy, etc  Will send culture, screen for STD and refer to urology  VTE Pharmacologic Prophylaxis:   Pharmacologic: Enoxaparin (Lovenox)  Mechanical VTE Prophylaxis in Place: no    Code Status: Level 1 - Full Code    Patient Centered Rounds: I have performed bedside rounds with nursing staff today  Discussions with Specialists or Other Care Team Provider: CLARY     Education and Discussions with Family / Patient: patient     Time Spent for Care: 30 minutes  More than 50% of total time spent on counseling and coordination of care as described above  Current Length of Stay: 1 day(s)    Current Patient Status: Inpatient     Certification Statement: The patient will continue to require additional inpatient hospital stay due to complicated opioid wd Discharge Plan: DC in 2 days, pt requesting o/p        Subjective:   Patient not yet feeling like he is in withdrawal  Comfortable at this time       Objective:     Clinical Opiate Withdrawal Scale  Pulse: 62  Resting Pulse Rate: Measured After Patient is Sitting or Lying for One Minute: Pulse rate 80 or below  GI Upset: Over Last Half Hour: No GI symptoms  Sweating: Over Past Half Hour Not Accounted for by Room Temperature of Patient Activity: No report of chills or flushing  Tremor: Observation of Outstretched Hands: No tremor  Restlessness: Observation During Assessment: Able to sit still  Yawning: Observation During Assessment: No yawning  Pupil Size: Pupils pinned or normal size for room light  Anxiety and Irritability: None  Bone or Joint Aches: If Patient was Having Pain Previously, Only the Additional Component Attributed to Opiate Withdrawal is Scored: Not present  Gooseflesh Skin: Skin is smooth  Runny Nose or Tearing: Not Accounted for by Cold Symptoms or Allergies: Not present  Clinical Opiate Withdrawal Scale Total Score: 0    No data recorded      Last 24 Hours Medication List:   Current Facility-Administered Medications   Medication Dose Route Frequency Provider Last Rate   • acetaminophen  650 mg Oral Q6H PRN Jocelin Soriano, CRNP     • clonazePAM  2 mg Oral Q6H PRN MAYELIN MarNP     • cloNIDine  0 1 mg Oral Q6H PRN Jocelin Soriano, CRNP     • enoxaparin  40 mg Subcutaneous Daily Jocelin Soriano, CRNP     • gabapentin  300 mg Oral Q8H PRN MAYELIN MarNP     • multivitamin-minerals  1 tablet Oral Daily Jocelin Soriano, CRNP     • nicotine  1 patch Transdermal Daily Jocelin Soriano, CRNP     • ondansetron  4 mg Intravenous Q6H PRN MAYELIN MarNP     • sodium chloride  100 mL/hr Intravenous Continuous Jocelin Soriano, CRNP 100 mL/hr (22 0148)   • transdermal buprenorphine  20 mcg Transdermal Q7 Days Jocelin Soriano, CRNP     • traZODone  50 mg Oral HS PRN Jocelin Soriano, CRNP           Vitals:   Temp (24hrs), Av 2 °F (36 8 °C), Min:97 9 °F (36 6 °C), Max:98 8 °F (37 1 °C)    Temp:  [97 9 °F (36 6 °C)-98 8 °F (37 1 °C)] 98 8 °F (37 1 °C)  HR:  [62-89] 62  Resp: [16-18] 16  BP: ()/(52-89) 93/52  SpO2:  [92 %-98 %] 95 %  Body mass index is 25 1 kg/m²  Input and Output Summary (last 24 hours): Intake/Output Summary (Last 24 hours) at 11/24/2022 1430  Last data filed at 11/24/2022 0148  Gross per 24 hour   Intake 950 ml   Output --   Net 950 ml       Physical Exam:   Physical Exam  Vitals and nursing note reviewed  HENT:      Head: Normocephalic and atraumatic  Mouth/Throat:      Mouth: Mucous membranes are moist    Eyes:      Extraocular Movements: Extraocular movements intact  Pupils: Pupils are equal, round, and reactive to light  Cardiovascular:      Rate and Rhythm: Normal rate  Pulmonary:      Effort: Pulmonary effort is normal    Abdominal:      General: Abdomen is flat  Musculoskeletal:         General: Normal range of motion  Cervical back: Normal range of motion  Skin:     General: Skin is warm and dry  Neurological:      General: No focal deficit present  Mental Status: He is alert and oriented to person, place, and time  Psychiatric:         Mood and Affect: Mood normal          Behavior: Behavior normal          Thought Content: Thought content normal          Additional Data:     Labs:   Results from last 7 days   Lab Units 11/24/22  0550 11/23/22 2010   WBC Thousand/uL 8 36 17 94*   HEMOGLOBIN g/dL 10 9* 12 6   HEMATOCRIT % 34 9* 39 9   PLATELETS Thousands/uL 461* 541*   NEUTROS PCT %  --  82*   LYMPHS PCT %  --  11*   MONOS PCT %  --  6   EOS PCT %  --  0      Results from last 7 days   Lab Units 11/24/22  0550   SODIUM mmol/L 134*   POTASSIUM mmol/L 4 1   CHLORIDE mmol/L 95*   CO2 mmol/L 34*   BUN mg/dL 19   CREATININE mg/dL 0 95   ANION GAP mmol/L 5   CALCIUM mg/dL 9 4   ALBUMIN g/dL 4 0   TOTAL BILIRUBIN mg/dL 0 95   ALK PHOS U/L 89   ALT U/L 30   AST U/L 33   GLUCOSE RANDOM mg/dL 112*                              * I Have Reviewed All Lab Data Listed Above  * Additional Pertinent Lab Tests Reviewed:  All Labs For Current Hospital Admission Reviewed      Imaging Studies: I have personally reviewed pertinent reports  Recent Cultures (last 7 days): Today, Patient Was Seen By: Kuldeep Evans DO    ** Please Note: Dictation voice to text software may have been used in the creation of this document   **

## 2022-11-25 PROBLEM — R31.29 MICROSCOPIC HEMATURIA: Status: ACTIVE | Noted: 2022-11-24

## 2022-11-25 LAB
ANION GAP SERPL CALCULATED.3IONS-SCNC: 8 MMOL/L (ref 5–14)
BACTERIA UR CULT: NORMAL
BUN SERPL-MCNC: 10 MG/DL (ref 5–25)
C TRACH DNA SPEC QL NAA+PROBE: NEGATIVE
CALCIUM SERPL-MCNC: 9.1 MG/DL (ref 8.4–10.2)
CHLORIDE SERPL-SCNC: 102 MMOL/L (ref 96–108)
CO2 SERPL-SCNC: 28 MMOL/L (ref 21–32)
CREAT SERPL-MCNC: 0.83 MG/DL (ref 0.7–1.5)
ERYTHROCYTE [DISTWIDTH] IN BLOOD BY AUTOMATED COUNT: 12.5 % (ref 11.6–15.1)
GFR SERPL CREATININE-BSD FRML MDRD: 113 ML/MIN/1.73SQ M
GLUCOSE SERPL-MCNC: 103 MG/DL (ref 70–99)
HAV IGM SER QL: NORMAL
HBV CORE IGM SER QL: NORMAL
HBV SURFACE AG SER QL: NORMAL
HCT VFR BLD AUTO: 35.7 % (ref 36.5–49.3)
HCV AB SER QL: NORMAL
HGB BLD-MCNC: 11.2 G/DL (ref 12–17)
MCH RBC QN AUTO: 28.2 PG (ref 26.8–34.3)
MCHC RBC AUTO-ENTMCNC: 31.4 G/DL (ref 31.4–37.4)
MCV RBC AUTO: 90 FL (ref 82–98)
N GONORRHOEA DNA SPEC QL NAA+PROBE: NEGATIVE
PLATELET # BLD AUTO: 468 THOUSANDS/UL (ref 149–390)
PMV BLD AUTO: 8.8 FL (ref 8.9–12.7)
POTASSIUM SERPL-SCNC: 3.8 MMOL/L (ref 3.5–5.3)
RBC # BLD AUTO: 3.97 MILLION/UL (ref 3.88–5.62)
SODIUM SERPL-SCNC: 138 MMOL/L (ref 135–147)
WBC # BLD AUTO: 8.27 THOUSAND/UL (ref 4.31–10.16)

## 2022-11-25 RX ORDER — BUPRENORPHINE AND NALOXONE 8; 2 MG/1; MG/1
8 FILM, SOLUBLE BUCCAL; SUBLINGUAL ONCE
Status: COMPLETED | OUTPATIENT
Start: 2022-11-25 | End: 2022-11-25

## 2022-11-25 RX ORDER — BUPRENORPHINE AND NALOXONE 8; 2 MG/1; MG/1
8 FILM, SOLUBLE BUCCAL; SUBLINGUAL 2 TIMES DAILY
Status: DISCONTINUED | OUTPATIENT
Start: 2022-11-25 | End: 2022-11-26 | Stop reason: HOSPADM

## 2022-11-25 RX ORDER — BUPRENORPHINE AND NALOXONE 2; .5 MG/1; MG/1
2 FILM, SOLUBLE BUCCAL; SUBLINGUAL
Status: COMPLETED | OUTPATIENT
Start: 2022-11-25 | End: 2022-11-25

## 2022-11-25 RX ORDER — BUPRENORPHINE AND NALOXONE 8; 2 MG/1; MG/1
8 FILM, SOLUBLE BUCCAL; SUBLINGUAL ONCE
Status: DISCONTINUED | OUTPATIENT
Start: 2022-11-25 | End: 2022-11-25

## 2022-11-25 RX ORDER — BUPRENORPHINE 2 MG/1
1 TABLET SUBLINGUAL ONCE
Status: COMPLETED | OUTPATIENT
Start: 2022-11-25 | End: 2022-11-25

## 2022-11-25 RX ORDER — BUPRENORPHINE AND NALOXONE 8; 2 MG/1; MG/1
8 FILM, SOLUBLE BUCCAL; SUBLINGUAL ONCE AS NEEDED
Status: COMPLETED | OUTPATIENT
Start: 2022-11-25 | End: 2022-11-25

## 2022-11-25 RX ADMIN — Medication 1 MG: at 05:22

## 2022-11-25 RX ADMIN — SODIUM CHLORIDE 100 ML/HR: 0.9 INJECTION, SOLUTION INTRAVENOUS at 01:21

## 2022-11-25 RX ADMIN — BUPRENORPHINE AND NALOXONE 2 MG: 2; .5 FILM BUCCAL; SUBLINGUAL at 08:04

## 2022-11-25 RX ADMIN — BUPRENORPHINE AND NALOXONE 8 MG: 8; 2 FILM BUCCAL; SUBLINGUAL at 18:03

## 2022-11-25 RX ADMIN — BUPRENORPHINE AND NALOXONE 8 MG: 8; 2 FILM BUCCAL; SUBLINGUAL at 13:01

## 2022-11-25 RX ADMIN — GABAPENTIN 300 MG: 300 CAPSULE ORAL at 21:26

## 2022-11-25 RX ADMIN — ENOXAPARIN SODIUM 40 MG: 100 INJECTION SUBCUTANEOUS at 08:04

## 2022-11-25 RX ADMIN — TRAZODONE HYDROCHLORIDE 50 MG: 50 TABLET ORAL at 21:26

## 2022-11-25 RX ADMIN — CLONAZEPAM 2 MG: 1 TABLET ORAL at 08:04

## 2022-11-25 RX ADMIN — BUPRENORPHINE AND NALOXONE 8 MG: 8; 2 FILM BUCCAL; SUBLINGUAL at 20:15

## 2022-11-25 RX ADMIN — BUPRENORPHINE AND NALOXONE 2 MG: 2; .5 FILM BUCCAL; SUBLINGUAL at 12:01

## 2022-11-25 RX ADMIN — NICOTINE 1 PATCH: 14 PATCH, EXTENDED RELEASE TRANSDERMAL at 08:04

## 2022-11-25 RX ADMIN — BUPRENORPHINE AND NALOXONE 2 MG: 2; .5 FILM BUCCAL; SUBLINGUAL at 10:09

## 2022-11-25 RX ADMIN — BUPRENORPHINE AND NALOXONE 2 MG: 2; .5 FILM BUCCAL; SUBLINGUAL at 06:41

## 2022-11-25 RX ADMIN — ACETAMINOPHEN 650 MG: 325 TABLET ORAL at 21:26

## 2022-11-25 NOTE — UTILIZATION REVIEW
NOTIFICATION OF INPATIENT ADMISSION   AUTHORIZATION REQUEST   SERVICING FACILITY:   [address] [place] [address]  Aki Andre  , [facility], [address] [place] [address]  Tax ID: [id number]  NPI: [id number] ATTENDING PROVIDER:  Attending Name and NPI#: Maia Simms [[id number]]  Address: Aki Andre [address] [place] [address], [address] [place] [address]  Phone: [phone]     ADMISSION INFORMATION:  Place of Service: Inpatient [address] [facility]  [address]  Place of Service Code: 21  Inpatient Admission Date/Time: 11/23/22 11:08 PM  Discharge Date/Time: No discharge date for patient encounter  Admitting Diagnosis Code/Description:  Admitted to substance misuse detoxification center [Z78 9]     UTILIZATION REVIEW CONTACT:  Kezia Maldonado Utilization   Network Utilization Review Department  Phone: [phone]  Fax [phone]  Email: [email or web address]  Contact for approvals/pending authorizations, clinical reviews, and discharge  PHYSICIAN ADVISORY SERVICES:  Medical Necessity Denial & Vgsw-ea-[facility]uit Review  Phone: [phone]  Fax: [phone]  Email: [email or web address]

## 2022-11-25 NOTE — UTILIZATION REVIEW
Initial Clinical Review    Pt initially admitted as Observation on 11/23 @ 2100  Changed to Inpatient on 11/23 @ 2308  Pt requiring continued stay d/t ongoing management of opioid withdrawal and initiation of Suboxone  Admission: Date/Time/Statement:   Admission Orders (From admission, onward)     Ordered        11/23/22 2308  Inpatient Admission  Once            11/23/22 2100  Place in Observation  Once                      Orders Placed This Encounter   Procedures   • Inpatient Admission     Standing Status:   Standing     Number of Occurrences:   1     Order Specific Question:   Level of Care     Answer:   Med Surg [16]     Order Specific Question:   Estimated length of stay     Answer:   More than 2 Midnights     Order Specific Question:   Certification     Answer:   I certify that inpatient services are medically necessary for this patient for a duration of greater than two midnights  See H&P and MD Progress Notes for additional information about the patient's course of treatment  ED Arrival Information     Expected   -    Arrival   11/23/2022 18:40    Acuity   Urgent            Means of arrival   Walk-In    Escorted by   Self    Service   Emergency Medicine    Admission type   Emergency            Arrival complaint   detox eval            Chief Complaint   Patient presents with   • Detox Evaluation     Patient was here yesterday  Patient would like to detox from fentanyl  Last use 9am  Patient uses 5-10 bags a day  Initial Presentation: 29 y o  male who presented to [address] [facility]  Inpatient admission to medical detox unit for evaluation and treatment of acute opioid withdrawal  PMHx: Anxiety, Substance use  Presented w/ request for detox from opioids  Uses 8-10 bags of heroin/fentanyl via insufflation daily, last use 11/23 @ 0900  On exam, restlessness and anxiety  UDS positive for amphetamines/meth, cocaine   COWS 0  Plan: COWS monitoring w/ symptomatic supportive care, Butrans patch, IVF, micro induction of Suboxone when clinically indicated, telemetry, continuous pulse ox, Trend labs, replete electrolytes as needed  Continue home meds  Date: 11/24/22 Day 2: Pt reports feeling as though he is not in withdrawal yet, re-eval in the late evening and pt reported feeling as though withdrawal is starting for him  Pt wishes to pursue outpatient rehab once medically cleared  On exam, tremors, sweating, piloerection  COWS 14  Plan: continue COWS monitoring w/ symptomatic supportive care, Butrans patch, IVF, micro induction of Suboxone when clinically indicated, telemetry, continuous pulse ox, Trend labs, replete electrolytes as needed  Continue home meds        ED Triage Vitals   Temperature Pulse Respirations Blood Pressure SpO2   11/23/22 1902 11/23/22 1902 11/23/22 1902 11/23/22 1902 11/23/22 1902   98 °F (36 7 °C) 89 18 111/89 95 %      Temp Source Heart Rate Source Patient Position - Orthostatic VS BP Location FiO2 (%)   11/23/22 1902 11/23/22 1902 11/23/22 1902 11/23/22 1902 --   Oral Monitor Lying Left arm       Pain Score       11/23/22 2235       No Pain          Wt Readings from Last 1 Encounters:   11/23/22 77 1 kg (170 lb)     Additional Vital Signs:   Date/Time Temp Pulse Resp BP SpO2 O2 Device   11/25/22 0748 98 8 °F (37 1 °C) 94 20 119/70 98 % None (Room air)   11/25/22 0524 99 1 °F (37 3 °C) 86 16 120/68 95 % None (Room air)   11/24/22 2242 -- 88 -- -- -- --   11/24/22 1936 98 3 °F (36 8 °C) 72 16 127/76 97 % None (Room air)   11/24/22 1520 -- 64 -- 112/69 96 % None (Room air)   11/24/22 1500 98 °F (36 7 °C) -- -- -- -- --   11/24/22 1101 -- 62 16 93/52 95 % None (Room air)   11/24/22 0727 98 8 °F (37 1 °C) -- 16 98/54 92 % None (Room air)   11/23/22 2235 97 9 °F (36 6 °C) 83 18 100/56 -- --   11/23/22 2225 -- 77 16 103/58 98 % None (Room air)      11/24/22 2242 11/24/22 1936 11/23/22 2235   Clinical Opiate Withdrawal Scale   Pulse 88  -TO 72  -TO 83   Resting Pulse Rate: Measured After Patient is Sitting or Lying for One Minute 1  -TO 0  -TO 0   GI Upset: Over Last Half Hour 1  -TO 0  -TO 0   Sweating: Over Past Half Hour Not Accounted for by Room Temperature of Patient Activity 1  -TO 0  -TO 0   Tremor: Observation of Outstretched Hands 2  -TO 0  -TO 0   Restlessness: Observation During Assessment 1  -TO 0  -TO 0   Yawning: Observation During Assessment 1  -TO 0  -TO 0   Pupil Size 1  -TO 0  -TO 0   Anxiety and Irritability 1  -TO 0  -TO 0   Bone or Joint Aches: If Patient was Having Pain Previously, Only the Additional Component Attributed to Opiate Withdrawal is Scored 1  -TO 0  -TO 0   Gooseflesh Skin 3  -TO 0  -TO 0   Runny Nose or Tearing: Not Accounted for by Cold Symptoms or Allergies 1  -TO 0  -TO 0   Clinical Opiate Withdrawal Scale Total Score 14  -TO 0  -TO 0       Pertinent Labs/Diagnostic Test Results:   XR chest portable   Final Result by Linsey Brooks MD (11/25 0825)      No acute cardiopulmonary disease                    Workstation performed: [id number]           Results from last 7 days   Lab Units 11/23/22 2010 11/22/22  1637   SARS-COV-2  Negative Negative     Results from last 7 days   Lab Units 11/25/22  0528 11/24/22  0550 11/23/22 2010   WBC Thousand/uL 8 27 8 36 17 94*   HEMOGLOBIN g/dL 11 2* 10 9* 12 6   HEMATOCRIT % 35 7* 34 9* 39 9   PLATELETS Thousands/uL 468* 461* 541*   NEUTROS ABS Thousands/µL  --   --  14 65*         Results from last 7 days   Lab Units 11/25/22  0528 11/24/22  0550 11/23/22 2010   SODIUM mmol/L 138 134* 135   POTASSIUM mmol/L 3 8 4 1 4 4   CHLORIDE mmol/L 102 95* 93*   CO2 mmol/L 28 34* 34*   ANION GAP mmol/L 8 5 8   BUN mg/dL 10 19 23   CREATININE mg/dL 0 83 0 95 1 19   EGFR ml/min/1 73sq m 113 103 78   CALCIUM mg/dL 9 1 9 4 10 4*   MAGNESIUM mg/dL  --  2 0 1 8     Results from last 7 days   Lab Units 11/24/22  0550 11/23/22 2010   AST U/L 33 42   ALT U/L 30 40   ALK PHOS U/L 89 112   TOTAL PROTEIN g/dL 7 9 9 7*   ALBUMIN g/dL 4 0 4 6   TOTAL BILIRUBIN mg/dL 0 95 1 65*         Results from last 7 days   Lab Units 11/25/22  0528 11/24/22  0550 11/23/22 2010   GLUCOSE RANDOM mg/dL 103* 112* 118*       Results from last 7 days   Lab Units 11/24/22  0550   HEP B S AG  Non-reactive   HEP C AB  Non-reactive   HEP B C IGM  Non-reactive     Results from last 7 days   Lab Units 11/23/22 2204   CLARITY UA  Slightly Cloudy*   COLOR UA  Yellow   SPEC GRAV UA  1 025   PH UA  5 0   GLUCOSE UA mg/dl Negative   KETONES UA mg/dl 5 (Trace)*   BLOOD UA  10 0*   PROTEIN UA mg/dl 30 (1+)*   NITRITE UA  Negative   BILIRUBIN UA  Negative   UROBILINOGEN UA mg/dL 1 0   LEUKOCYTES UA  25 0*   WBC UA /hpf 2-4   RBC UA /hpf None Seen   BACTERIA UA /hpf Occasional   EPITHELIAL CELLS WET PREP /hpf Occasional   MUCUS THREADS  Occasional*     Results from last 7 days   Lab Units 11/23/22 2010   INFLUENZA A PCR  Negative   INFLUENZA B PCR  Negative   RSV PCR  Negative         Results from last 7 days   Lab Units 11/23/22 2204   AMPH/METH  Positive*   BARBITURATE UR  Negative   BENZODIAZEPINE UR  Negative   COCAINE UR  Positive*   METHADONE URINE  Negative   OPIATE UR  Negative   PCP UR  Negative   THC UR  Negative     Results from last 7 days   Lab Units 11/23/22 2010   ETHANOL LVL mg/dL <10       ED Treatment:   Medication Administration from 11/23/2022 1839 to 11/23/2022 2229       Date/Time Order Dose Route Action     11/23/2022 2057 EST LORazepam (ATIVAN) tablet 2 mg 2 mg Oral Given        Past Medical History:   Diagnosis Date   • Anxiety    • Drug abuse ([facility] )    • Leukocytosis 12/22/2018   • Substance abuse ([facility] )      Present on Admission:  • Tobacco dependence  • Polysubstance dependence including opioid type drug with complication, continuous use (HCC)  • Opioid use disorder, severe, dependence (HCC)  • (Resolved) Leukocytosis  • Opioid withdrawal (HCC)  • Anxiety      Admitting Diagnosis: Admitted to substance misuse detoxification center [Z78 9]  Age/Sex: 29 y o  male  Admission Orders:  Regular Diet  SCDs  COWS monitoring  Continuous Pulse Ox  Scheduled Medications:  enoxaparin, 40 mg, Subcutaneous, Daily  multivitamin-minerals, 1 tablet, Oral, Daily  nicotine, 1 patch, Transdermal, Daily  transdermal buprenorphine, 20 mcg, Transdermal, Q7 Days    Continuous IV Infusions:  sodium chloride 0 9 %, 100 mL/hr, Intravenous, Continuous    PRN Meds:  acetaminophen, 650 mg, Oral, Q6H PRN  clonazePAM, 2 mg, Oral, Q6H PRN  cloNIDine, 0 1 mg, Oral, Q6H PRN; 11/24 x2  gabapentin, 300 mg, Oral, Q8H PRN; 11/24 x2  ondansetron, 4 mg, Intravenous, Q6H PRN  traZODone, 50 mg, Oral, HS PRN; 11/24 x1        IP CONSULT TO CASE MANAGEMENT    Network Utilization Review Department  ATTENTION: Please call with any questions or concerns to [phone] and carefully listen to the prompts so that you are directed to the right person  All voicemails are confidential   Chiquita Loera all requests for admission clinical reviews, approved or denied determinations and any other requests to dedicated fax number below belonging to the campus where the patient is receiving treatment   List of dedicated fax numbers for the Facilities:  [address] DENIALS (Administrative/Medical Necessity) [phone]   [address] (Maternity/NICU/Pediatrics) [phone]   [address] L[address] [phone]   [facility] 77 [phone]   [address] [address] Medical [place] [place] [address] [place] [address] 28 [phone]   [address] [place] [facility] 134 [address] [facility] [phone]

## 2022-11-25 NOTE — PROGRESS NOTES
11/25/22 1312   Referral Data   Referral Source Patient   Referral Reason Drug/Alcohol [address] [facility]   Readmission Root Cause   30 Day Readmission No   Patient Information   Mental Status Alert   Primary Caregiver Self   Support System Immediate family;Friends   Legal Information   Legal Issues Denies   Activities of Daily Living Prior to Admission   Functional Status Independent   Assistive Device No device needed   Living Arrangement Lives with someone  (Pt resides with his parents)   Ambulation Independent   Access to Firearms   Access to Firearms No   Income [address] [employer] and [facility] [employer]   Isiah[employer]ET of Transport to Appts: [employer] Self       Worker completed assessment  Worker explained role of case management  Worker confirmed name, address and phone number  Pt vague and guarded during assessment due to withdraw symptoms  Pt reports currently having shakes, chills, and jittery movements  Pt reports using fentanyl, cocaine and meth prior to admission  Pt reports using 8-10 bags of fentanyl daily via snorting for the last few years  Pt reports first age of use 33  Pt reports using 5 bags prior to admission, last was 11/23  Pt reports having 2 years clean  Pt reports being on Suboxone MAT in the past   Pt reports using crack cocaine, 1-2 times per week  Pt did not specify the amount he was using, reports last use a few days prior to admission  Pt reports first age of use 29, reports started using a few months ago  Pt reports smoking meth, 1-2 times per week, amount unknown  Pt reports first age of use 29, started using a few months ago  Pt reports being in inpatient rehab in the past, last was in 2021 at [facility]  Pt at this time requesting outpatient MAT, verbal KALEE for [facility], worker completed referral to office  AUDIT: 0  Alcohol: 0  UDS: meth/cocaine  PAWSS: 0    Pt is currently single, has 2 daughters    Pt resides with his parents at [address] [facility], [address] [place] [address]  Pt is currently unemployed  Pt's highest level of education is 9th grade  Pt denies any legal issues, reports having ongoing domestic issues  Pt denies any inpatient psychiatric hospitalizations  Pt denies any outpatient psychiatric provider  Pt denies any suicide attempts  Pt denies any abuse or losses  Pt did not report any medical issues, has no current PCP  PCP listed on insurance card is [facility], verbal KALEE given  Worker scheduled new pt appointment for 11/30 at 1300  Pt reports having health insurance of [employer], gave verbal KALEE  Pt declined to sign KALEE for any family or friends  Relapse prevention plan was completed  Pt was able to identify his warning signs  Pt reports not having any coping skills but also reported not being able to think due to withdrawal symptoms  Pt reports his parents are his primary support system  Clinical impression, pt was very guarded and vague regarding substance abuse, specifically crack cocaine and methamphetamine abuse  Pt was experiencing withdraw symptoms so difficult to gauge pt's motivation factor at this time  Pt is requesting outpatient MAT treatment, referral to [facility] was submitted  Pt is in the contemplation stage of change

## 2022-11-25 NOTE — PROGRESS NOTES
PROGRESS NOTE  DEPARTMENT OF MEDICAL TOXICOLOGY  LEVEL 4 MEDICAL DETOX UNIT  Kristin Grimes 29 y o  male MRN: [id number]  Unit/Bed#: 5T DETOX 928-41 Encounter: [id number]      Reason for Admission/Principal Problem: opioid wd   Rounding Provider: Debra Mackey DO  Attending Provider: Debra Mackey DO   11/23/2022  7:03 PM           Microscopic hematuria  Assessment & Plan  · UA sent for leukocytosis  · UA remarkable for multiple abnormalities that could bc c/w contamination, renal dysfunction especially in presence of sympathomimetic drug use, infection, malignancy, etc    · No urinary symptoms so will hold on antibiotics at this point  · Urine culture pending  · STD screen negative    · Referral to urology     Opioid use disorder, severe, [facility] ([facility] )  Assessment & Plan  Endorses longstanding history of opioid use disorder with co-occurring polysubstance use  Uses 8-10 daily via insufflation, denies h/o IVDU  Reports history of prior maintenance on Suboxone   Hepatitis/HIV screening negative   Case management consulted for assistance with disposition planning - pt expresses interest in outpatient follow up  Referred to [facility]  Tobacco dependence  Assessment & Plan  Pt reports daily tobacco use   Offer NRT   Encourage cessation    Polysubstance dependence including opioid type drug with complication, continuous use ([facility] )  Assessment & Plan  · Endorses almost daily use of methamphetamine and crack cocaine  · Reports ongoing insomnia and anxiety  Denies chest pain, palpitations  · EKG reviewed: 72 bpm, NSR, non specific ST segment changes in lateral leads   QTC 457ms   · Encourage cessation       Anxiety  Assessment & Plan  · History of untreated anxiety and PTSD  · Co-occurring substance use and mental health disorder   · Currently unable to articulate his interest in pursuing treatment however would benefit from ongoing outpatient treatment   · Will discuss and encourage outpatient management     * Opioid withdrawal ([facility] )  Assessment & Plan  Advancing on buprenorphine, seems wd is worsening with good response to buprenorphine so will advance to 8mg dosing  Symptomatic and supportive care  Continuous pulse oximetry monitoring    Leukocytosis-resolved as of 11/24/2022  Assessment & Plan  · WBC 17 94 upon arrival to ED, now resolved  · Likely reactive to opioid withdrawal, multiple occurrences upon review of past admissions   · Afebrile, denies recent illness, fever, chills, cough, sob  · Negative FLU/RSV/COVID  · CXR unremarkable for acute infection   · UA remarkable for multiple abnormalities that could bc c/w contamination, renal dysfunction especially in presence of sympathomimetic drug use, infection, malignancy, etc  Will send culture, screen for STD and refer to urology  VTE Pharmacologic Prophylaxis:   Pharmacologic: Enoxaparin (Lovenox)  Mechanical VTE Prophylaxis in Place: no     Code Status: Level 1 - Full Code    Patient Centered Rounds: I have performed bedside rounds with nursing staff today  Discussions with Specialists or Other Care Team Provider: silke     Education and Discussions with Family / Patient: patient     Time Spent for Care: 30 minutes  More than 50% of total time spent on counseling and coordination of care as described above  Current Length of Stay: 2 day(s)    Current Patient Status: Inpatient     Certification Statement: The patient will continue to require additional inpatient hospital stay due to continued stabilization of opioid wd Discharge Plan: likely DC tomorrow, f/u [facility]        Subjective:   Patient improving on Suboxone       Objective:     Clinical Opiate Withdrawal Scale  Pulse: 96  Resting Pulse Rate: Measured After Patient is Sitting or Lying for One Minute: Pulse rate   GI Upset: Over Last Half Hour: Stomach cramps  Sweating: Over Past Half Hour Not Accounted for by Room Temperature of Patient Activity: Subjective report of chills or flushing  Tremor: Observation of Outstretched Hands: Slight tremor observable  Restlessness: Observation During Assessment: Reports difficulty sitting still, but is able to do so  Yawning: Observation During Assessment: Yawning once or twice during assessment  Pupil Size: Pupils possibly larger than normal for room light  Anxiety and Irritability: Patient reports increasing irritability or anxiousness  Bone or Joint Aches: If Patient was Having Pain Previously, Only the Additional Component Attributed to Opiate Withdrawal is Scored: Mild diffuse discomfort  Gooseflesh Skin: Piloerection of skin can be felt or hairs standing up on arms  Runny Nose or Tearing: Not Accounted for by Cold Symptoms or Allergies: Nasal stuffiness of unusually moist eyes  Clinical Opiate Withdrawal Scale Total Score: 14    No data recorded      Last 24 Hours Medication List:   Current Facility-Administered Medications   Medication Dose Route Frequency Provider Last Rate   • acetaminophen  650 mg Oral Q6H PRN MAYELIN AlvaradoNP     • buprenorphine-naloxone  8 mg Sublingual Once [employer], MAYELINNP     • buprenorphine-naloxone  8 mg Sublingual BID DARLENE Alvarado     • clonazePAM  2 mg Oral Q6H PRN [employer], CRNP     • cloNIDine  0 1 mg Oral Q6H PRN MAYELIN AlvaradoNP     • enoxaparin  40 mg Subcutaneous Daily MAYELIN AlvaradoNP     • gabapentin  300 mg Oral Q8H PRN [employer], CRNP     • multivitamin-minerals  1 tablet Oral Daily MAYELIN AlvaradoNP     • nicotine  1 patch Transdermal Daily MAYELIN AlvaradoNP     • ondansetron  4 mg Intravenous Q6H PRN [employer], CRNP     • sodium chloride  100 mL/hr Intravenous Continuous MAYELIN AlvaradoNP Stopped (22 1200)   • transdermal buprenorphine  20 mcg Transdermal Q7 Days DARLENE Alvarado     • traZODone  50 mg Oral HS PRN MAYELIN AlvaradoNP           Vitals:   Temp (24hrs), Av 6 °F (37 °C), Min:98 °F (36 7 °C), Max:99 1 °F (37 3 °C)    Temp:  [98 °F (36 7 °C)-99 1 °F (37 3 °C)] 98 7 °F (37 1 °C)  HR:  [64-96] 96  Resp:  [16-20] 20  BP: (104-127)/(68-76) 104/69  SpO2:  [95 %-98 %] 97 %  Body mass index is 25 1 kg/m²  Input and Output Summary (last 24 hours):No intake or output data in the 24 hours ending 11/25/22 1319    Physical Exam:   Physical Exam  Vitals and nursing note reviewed  Constitutional:       Appearance: He is diaphoretic  He is not ill-appearing  HENT:      Head: Normocephalic  Nose: Rhinorrhea present  Mouth/Throat:      Mouth: Mucous membranes are moist    Eyes:      Pupils: Pupils are equal, round, and reactive to light  Cardiovascular:      Rate and Rhythm: Normal rate  Pulmonary:      Effort: Pulmonary effort is normal    Abdominal:      General: Abdomen is flat  Musculoskeletal:         General: Normal range of motion  Cervical back: Normal range of motion  Skin:     General: Skin is warm  Comments: +gooseflesh    Neurological:      General: No focal deficit present  Mental Status: He is alert and oriented to person, place, and time  Psychiatric:         Mood and Affect: Mood normal          Thought Content: Thought content normal          Judgment: Judgment normal          Additional Data:     Labs:   Results from last 7 days   Lab Units 11/25/22 0528 11/24/22 0550 11/23/22 2010   WBC Thousand/uL 8 27   < > 17 94*   HEMOGLOBIN g/dL 11 2*   < > 12 6   HEMATOCRIT % 35 7*   < > 39 9   PLATELETS Thousands/uL 468*   < > 541*   NEUTROS PCT %  --   --  82*   LYMPHS PCT %  --   --  11*   MONOS PCT %  --   --  6   EOS PCT %  --   --  0    < > = values in this interval not displayed        Results from last 7 days   Lab Units 11/25/22 0528 11/24/22  0550   SODIUM mmol/L 138 134*   POTASSIUM mmol/L 3 8 4 1   CHLORIDE mmol/L 102 95*   CO2 mmol/L 28 34*   BUN mg/dL 10 19   CREATININE mg/dL 0 83 0 95   ANION GAP mmol/L 8 5   CALCIUM mg/dL 9 1 9 4   ALBUMIN g/dL  --  4 0 TOTAL BILIRUBIN mg/dL  --  0 95   ALK PHOS U/L  --  89   ALT U/L  --  30   AST U/L  --  33   GLUCOSE RANDOM mg/dL 103* 112*                              * I Have Reviewed All Lab Data Listed Above  * Additional Pertinent Lab Tests Reviewed: Roselyn 66 Admission Reviewed        Today, Patient Was Seen By: Gina Creda DO    ** Please Note: Dictation voice to text software may have been used in the creation of this document   **

## 2022-11-25 NOTE — PLAN OF CARE
Problem: Potential for Falls  Goal: Patient will remain free of falls  Description: INTERVENTIONS:  - Educate patient/family on patient safety including physical limitations  - Instruct patient to call for assistance with activity   - Consult OT/PT to assist with strengthening/mobility   - Keep Call bell within reach  - Keep bed low and locked with side rails adjusted as appropriate  - Keep care items and personal belongings within reach  - Initiate and maintain comfort rounds  - Make Fall Risk Sign visible to staff  - Apply yellow socks and bracelet for high fall risk patients  - Consider moving patient to room near nurses station  Outcome: Progressing     Problem: SUBSTANCE USE/ABUSE  Goal: By discharge, will develop insight into their chemical dependency and sustain motivation to continue in recovery  Description: INTERVENTIONS:  - Attends all daily group sessions and scheduled AA groups  - Actively practices coping skills through participation in the therapeutic community and adherence to program rules  - Reviews and completes assignments from individual treatment plan  - Assist patient development of understanding of their personal cycle of addiction and relapse triggers  Outcome: Progressing  Goal: By discharge, patient will have ongoing treatment plan addressing chemical dependency  Description: INTERVENTIONS:  - Assist patient with resources and/or appointments for ongoing recovery based living  Outcome: Progressing     Problem: INFECTION - ADULT  Goal: Absence or prevention of progression during hospitalization  Description: INTERVENTIONS:  - Assess and monitor for signs and symptoms of infection  - Monitor lab/diagnostic results  - Monitor all insertion sites, i e  indwelling lines, tubes, and drains  - Monitor endotracheal if appropriate and nasal secretions for changes in amount and color  - [place] appropriate cooling/warming therapies per order  - Administer medications as ordered  - Instruct and encourage patient and family to use good hand hygiene technique  - Identify and instruct in appropriate isolation precautions for identified infection/condition  Outcome: Progressing  Goal: Absence of fever/infection during neutropenic period  Description: INTERVENTIONS:  - Monitor WBC    Outcome: Progressing     Problem: SAFETY ADULT  Goal: Patient will remain free of falls  Description: INTERVENTIONS:  - Educate patient/family on patient safety including physical limitations  - Instruct patient to call for assistance with activity   - Consult OT/PT to assist with strengthening/mobility   - Keep Call bell within reach  - Keep bed low and locked with side rails adjusted as appropriate  - Keep care items and personal belongings within reach  - Initiate and maintain comfort rounds  - Make Fall Risk Sign visible to staff  - Apply yellow socks and bracelet for high fall risk patients  - Consider moving patient to room near nurses station  Outcome: Progressing  Goal: Maintain or return to baseline ADL function  Description: INTERVENTIONS:  -  Assess patient's ability to carry out ADLs; assess patient's baseline for ADL function and identify physical deficits which impact ability to perform ADLs (bathing, care of mouth/teeth, toileting, grooming, dressing, etc )  - Assess/evaluate cause of self-care deficits   - Assess range of motion  - Assess patient's mobility; develop plan if impaired  - Assess patient's need for assistive devices and provide as appropriate  - Encourage maximum independence but intervene and supervise when necessary  - Involve family in performance of ADLs  - Assess for home care needs following discharge   - Consider OT consult to assist with ADL evaluation and planning for discharge  - Provide patient education as appropriate  Outcome: Progressing  Goal: Maintains/Returns to pre admission functional level  Description: INTERVENTIONS:  - Perform BMAT or MOVE assessment daily    - Set and communicate daily mobility goal to care team and patient/family/caregiver  - Collaborate with rehabilitation services on mobility goals if consulted  - Out of bed for toileting  - Record patient progress and toleration of activity level   Outcome: Progressing     Problem: DISCHARGE PLANNING  Goal: Discharge to home or other facility with appropriate resources  Description: INTERVENTIONS:  - Identify barriers to discharge w/patient and caregiver  - Arrange for needed discharge resources and transportation as appropriate  - Identify discharge learning needs (meds, wound care, etc )  - Arrange for interpretive services to assist at discharge as needed  - Refer to Case Management Department for coordinating discharge planning if the patient needs post-hospital services based on physician/advanced practitioner order or complex needs related to functional status, cognitive ability, or social support system  Outcome: Progressing     Problem: Knowledge Deficit  Goal: Patient/family/caregiver demonstrates understanding of disease process, treatment plan, medications, and discharge instructions  Description: Complete learning assessment and assess knowledge base    Interventions:  - Provide teaching at level of understanding  - Provide teaching via preferred learning methods  Outcome: Progressing

## 2022-11-25 NOTE — PROGRESS NOTES
PROGRESS NOTE  DEPARTMENT OF MEDICAL TOXICOLOGY  LEVEL 4 MEDICAL DETOX UNIT  Eliud Moreno 29 y o  male MRN: [id number]  Unit/Bed#: 5T DETOX 156-25 Encounter: [id number]      Reason for Admission/Principal Problem: opioid withdrawal   Rounding Provider: DARLENE Gonzales  Attending Provider: Mariely Mayfield DO   11/23/2022  7:03 PM       * Opioid withdrawal [facility])  Assessment & Plan  Presents requesting assistance with buprenorphine induction   Reports last use 11/23 9am, possibly later that evening  Tolerating Butrans patch and exhibiting symptoms of moderate withdrawal  Initiated test dose and low dose microinduction this morning  Symptomatic and supportive care  Continuous pulse oximetry monitoring    Opioid use disorder, severe, dependence ([facility] )  Assessment & Plan  Endorses longstanding history of opioid use disorder with co-occurring polysubstance use  Uses 8-10 daily via insufflation, denies h/o IVDU  Reports history of prior maintenance on Suboxone   Screen for hepatitis/HIV  Case management consulted for assistance with disposition planning - pt expresses interest in outpatient follow up       Polysubstance dependence including opioid type drug with complication, continuous use ([facility] )  Assessment & Plan  · Endorses almost daily use of methamphetamine and crack cocaine  · Reports ongoing insomnia and anxiety  Denies chest pain, palpitations  · EKG reviewed: 72 bpm, NSR, non specific ST segment changes in lateral leads  QTC 457ms   · Encourage cessation       Abnormal laboratory test result  Assessment & Plan  · UA sent for leukocytosis  · UA remarkable for multiple abnormalities that could bc c/w contamination, renal dysfunction especially in presence of sympathomimetic drug use, infection, malignancy, etc    · No urinary symptoms so will hold on antibiotics at this point     · Urine culture and STD screen pending   · Referral to urology     Tobacco dependence  Assessment & Plan  Pt reports daily tobacco use   Offer NRT   Encourage cessation    Anxiety  Assessment & Plan  · History of untreated anxiety and PTSD  · Co-occurring substance use and mental health disorder   · Currently unable to articulate his interest in pursuing treatment however would benefit from ongoing outpatient treatment   · Will discuss and encourage outpatient management     Leukocytosis-resolved as of 11/24/2022  Assessment & Plan  · WBC 17 94 upon arrival to ED, now resolved  · Likely reactive to opioid withdrawal, multiple occurrences upon review of past admissions   · Afebrile, denies recent illness, fever, chills, cough, sob  · Negative FLU/RSV/COVID  · CXR unremarkable for acute infection   · UA remarkable for multiple abnormalities that could bc c/w contamination, renal dysfunction especially in presence of sympathomimetic drug use, infection, malignancy, etc  Will send culture, screen for STD and refer to urology  VTE Pharmacologic Prophylaxis:   Pharmacologic: Enoxaparin (Lovenox)  Mechanical VTE Prophylaxis in Place: yes    Code Status: Level 1 - Full Code    Patient Centered Rounds: I have performed bedside rounds with nursing staff today  Discussions with Specialists or Other Care Team Provider: case management     Education and Discussions with Family / Patient: patient    Time Spent for Care: 20 minutes  More than 50% of total time spent on counseling and coordination of care as described above  Current Length of Stay: 2 day(s)    Current Patient Status: Inpatient     Certification Statement: The patient will continue to require additional inpatient hospital stay due to continued monitoring and management of complicated opioid withdrawal  Discharge Plan: pending case management disposition         Subjective:   Upon exam appears restless, anxious, flushed skin  He endorses myalgias, rhinorrhea, and beginning to experience loss of appetite due to nausea       Objective:     Clinical Opiate Withdrawal Scale  Pulse: 86  Resting Pulse Rate: Measured After Patient is Sitting or Lying for One Minute: Pulse rate   GI Upset: Over Last Half Hour: Stomach cramps  Sweating: Over Past Half Hour Not Accounted for by Room Temperature of Patient Activity: Subjective report of chills or flushing  Tremor: Observation of Outstretched Hands: Slight tremor observable  Restlessness: Observation During Assessment: Reports difficulty sitting still, but is able to do so  Yawning: Observation During Assessment: Yawning once or twice during assessment  Pupil Size: Pupils possibly larger than normal for room light  Anxiety and Irritability: Patient reports increasing irritability or anxiousness  Bone or Joint Aches: If Patient was Having Pain Previously, Only the Additional Component Attributed to Opiate Withdrawal is Scored: Mild diffuse discomfort  Gooseflesh Skin: Piloerection of skin can be felt or hairs standing up on arms  Runny Nose or Tearing: Not Accounted for by Cold Symptoms or Allergies: Nasal stuffiness of unusually moist eyes  Clinical Opiate Withdrawal Scale Total Score: 14    No data recorded      Last 24 Hours Medication List:   Current Facility-Administered Medications   Medication Dose Route Frequency Provider Last Rate   • acetaminophen  650 mg Oral Q6H PRN DARLENE Alvarado     • clonazePAM  2 mg Oral Q6H PRN DARLENE Loaiza     • cloNIDine  0 1 mg Oral Q6H PRN DARLENE Alvarado     • enoxaparin  40 mg Subcutaneous Daily DARLENE Alvarado     • gabapentin  300 mg Oral Q8H PRN DARLENE Loaiza     • multivitamin-minerals  1 tablet Oral Daily DARLENE Alvarado     • nicotine  1 patch Transdermal Daily DARLENE Alvarado     • ondansetron  4 mg Intravenous Q6H PRN DARLENE Loaiza     • sodium chloride  100 mL/hr Intravenous Continuous MAYELIN AlvaradoNP 100 mL/hr (11/25/22 0121)   • transdermal buprenorphine  20 mcg Transdermal Q7 Days Ritu Bowden DARLENE     • traZODone  50 mg Oral HS PRN DARLENE Alvarado           Vitals:   Temp (24hrs), Av 6 °F (37 °C), Min:98 °F (36 7 °C), Max:99 1 °F (37 3 °C)    Temp:  [98 °F (36 7 °C)-99 1 °F (37 3 °C)] 99 1 °F (37 3 °C)  HR:  [62-88] 86  Resp:  [16] 16  BP: ()/(52-76) 120/68  SpO2:  [92 %-97 %] 95 %  Body mass index is 25 1 kg/m²  Input and Output Summary (last 24 hours):No intake or output data in the 24 hours ending 22 0544    Physical Exam:   Physical Exam    Additional Data:     Labs:   Results from last 7 days   Lab Units 22  0550 22   WBC Thousand/uL 8 36 17 94*   HEMOGLOBIN g/dL 10 9* 12 6   HEMATOCRIT % 34 9* 39 9   PLATELETS Thousands/uL 461* 541*   NEUTROS PCT %  --  82*   LYMPHS PCT %  --  11*   MONOS PCT %  --  6   EOS PCT %  --  0      Results from last 7 days   Lab Units 22  0550   SODIUM mmol/L 134*   POTASSIUM mmol/L 4 1   CHLORIDE mmol/L 95*   CO2 mmol/L 34*   BUN mg/dL 19   CREATININE mg/dL 0 95   ANION GAP mmol/L 5   CALCIUM mg/dL 9 4   ALBUMIN g/dL 4 0   TOTAL BILIRUBIN mg/dL 0 95   ALK PHOS U/L 89   ALT U/L 30   AST U/L 33   GLUCOSE RANDOM mg/dL 112*                              * I Have Reviewed All Lab Data Listed Above  * Additional Pertinent Lab Tests Reviewed: Roselyn 66 Admission Reviewed      Imaging Studies: I have personally reviewed pertinent reports  Recent Cultures (last 7 days): Today, Patient Was Seen By: DARLENE Alcantara    ** Please Note: Dictation voice to text software may have been used in the creation of this document   **

## 2022-11-25 NOTE — DISCHARGE INSTR - OTHER ORDERS
Drug and Alcohol Resources in [facility]    If you have health insurance, including medical assistance, there should be a phone number on your insurance card that you can call to find out how to access services  The card may say, “For [address] or “For Drug and Alcohol Services” or “For Substance Abuse Services” call the number provided  [facility]  Mitchellanikhil W[place]ster, [place], James Scooterc[place]y [place]  [phone]  A  is available Monday through Friday from 8:00 am to 5:00 pm to provide you with assistance on accessing services for substance abuse  If you do not have health insurance and are in financial need, this office may also help you get the funding for the services that are necessary  [facility] provides funding to support three Recovery Centers in [facility]  These centers offer a safe, sober environment to those in recovery  A variety of programming including 12-Step Meetings, Rissa Abbot, Life Skills Workshops, etc  is offered at each location  [address]  [phone]  www  cleanslatebangor  org [address]  Jackie Gutierrez, [address] [facility]  [phone]  mijpqy-rf-yfsz  Aki [address] [facility] [address]  [phone]   [address] [place] [address]  [phone]  www  roxanamoni  [facility], [address] [place] [address]  [phone]  [facility]  Confidential free help, from public health agencies, to find substance use treatment and information  [phone]    Link for Zoom Codes for Virtual 12 step Meetings  Adam wiggins uk  aspx  Alcoholics Anonymous  [facility]  [phone]    http://www benavidez com/  Narcotics Anonymous  [phone]  https://PaeDae/

## 2022-11-25 NOTE — QUICK NOTE
Evaluated patient undergoing monitored opioid withdrawal  Last reported use 11/23 9am, possibly later during that day  >36 hours since last use  Upon exam he appears restless, anxious, flushed skin  He endorses myalgias, rhinorrhea, and beginning to experience loss of appetite due to nausea  Expresses concern of precipitated withdrawal  Explained low dose induction regimen and the role Butrans patch has taken in providing minimal exposure to buprenorphine over the last 24+ hours  This assured him and he is amenable to starting micro-induction this morning       Plan   - 1 mg subutex test dose, if tolerated will begin 2/0 5mg buprenorphine/naloxone Q2hrs x 4 doses prior to transition to maintenance 8/2mg BID dosing    - Continue symptomatic and supportive care

## 2022-11-26 VITALS
OXYGEN SATURATION: 98 % | HEART RATE: 85 BPM | WEIGHT: 170 LBS | DIASTOLIC BLOOD PRESSURE: 78 MMHG | SYSTOLIC BLOOD PRESSURE: 115 MMHG | BODY MASS INDEX: 25.18 KG/M2 | TEMPERATURE: 98.5 F | RESPIRATION RATE: 18 BRPM | HEIGHT: 69 IN

## 2022-11-26 PROBLEM — F11.93 OPIOID WITHDRAWAL (HCC): Status: RESOLVED | Noted: 2022-11-24 | Resolved: 2022-11-26

## 2022-11-26 RX ORDER — BUPRENORPHINE AND NALOXONE 8; 2 MG/1; MG/1
8 FILM, SOLUBLE BUCCAL; SUBLINGUAL 2 TIMES DAILY
Qty: 60 FILM | Refills: 0 | Status: SHIPPED | OUTPATIENT
Start: 2022-11-26 | End: 2022-12-26

## 2022-11-26 RX ADMIN — BUPRENORPHINE AND NALOXONE 8 MG: 8; 2 FILM BUCCAL; SUBLINGUAL at 08:19

## 2022-11-26 RX ADMIN — ENOXAPARIN SODIUM 40 MG: 100 INJECTION SUBCUTANEOUS at 08:19

## 2022-11-26 RX ADMIN — MULTIPLE VITAMINS W/ MINERALS TAB 1 TABLET: TAB ORAL at 08:19

## 2022-11-26 NOTE — DISCHARGE SUMMARY
MEDICAL DETOX UNIT, LEVEL 4  Department of Medical Toxicology  Reason for Admission/Principal Problem: opioid withdrawal/opioid use disorder  Admitting provider: Kimmie Hahn, [address], [place]   11/23/2022  7:03 PM       Discharging Physician / Practitioner: DARLENE Burks Aas  PCP: Margaret Thomson MD  Admission Date:   Admission Orders (From admission, onward)     Ordered        11/23/22 2308  Inpatient Admission  Once            11/23/22 2100  Place in Observation  Once                      Discharge Date: 11/26/22    Medical Problems     Resolved Problems  Date Reviewed: 11/24/2022          Resolved    Leukocytosis 11/24/2022     Resolved by  Smiley Tavarez DO    Opioid withdrawal ([facility] ) 11/26/2022     Resolved by  DARLENE Burks Aas          Opioid withdrawal (HCC)-resolved as of 11/26/2022  Assessment & Plan  Tolerated microinduction with transition to maintenance dosing    Symptomatic and supportive care  Continuous pulse oximetry monitoring    * Opioid use disorder, severe, dependence ([facility] )  Assessment & Plan  Endorses longstanding history of opioid use disorder with co-occurring polysubstance use  Uses 8-10 daily via insufflation, denies h/o IVDU  Reports history of prior maintenance on Suboxone   Hepatitis/HIV screening negative   Case management consulted for assistance with disposition planning - pt expresses interest in outpatient follow up  Referred to [facility]  Polysubstance dependence including opioid type drug with complication, continuous use (HCC)  Assessment & Plan  · Endorses almost daily use of methamphetamine and crack cocaine  · Reports ongoing insomnia and anxiety  Denies chest pain, palpitations  · EKG reviewed: 72 bpm, NSR, non specific ST segment changes in lateral leads   QTC 457ms   · Encourage cessation       Microscopic hematuria  Assessment & Plan  · UA remarkable for multiple abnormalities that could bc c/w contamination, renal dysfunction especially in presence of sympathomimetic drug use, infection, malignancy, etc    · Continues to have no urinary symptoms  · Urine culture negative  · STD screen negative    · Encourage outpatient urology evaluation     Tobacco dependence  Assessment & Plan  Pt reports daily tobacco use   Offer NRT   Encourage cessation    Anxiety  Assessment & Plan  · History of untreated anxiety and PTSD  · Co-occurring substance use and mental health disorder   · Would benefit from ongoing outpatient treatment   · Encourage outpatient management     Leukocytosis-resolved as of 11/24/2022  Assessment & Plan  · WBC 17 94 upon arrival to ED, now resolved  · Likely reactive to opioid withdrawal, multiple occurrences upon review of past admissions   · Afebrile, denies recent illness, fever, chills, cough, sob  · Negative FLU/RSV/COVID  · CXR unremarkable for acute infection   · UA remarkable for multiple abnormalities that could bc c/w contamination, renal dysfunction especially in presence of sympathomimetic drug use, infection, malignancy, etc  Will send culture, screen for STD and refer to urology  Consultations During Hospital Stay:  · Case management    Procedures Performed:   · none    Significant Findings / Test Results:   · Microscopic hematuria  · STD screen negative  · Urine culture no growth   · HIV negative  · Acute hepatitis panel negative     Incidental Findings:   · none     Test Results Pending at Discharge (will require follow up):   · none     Outpatient Tests Requested:  · none    Complications:  none    Reason for Admission: opioid withdrawal     Hospital Course:     Lorena Moreno is a 29 y o  male patient who originally presented to the hospital on 11/23/2022 due to opioid withdrawal   Patient initially presented to the [facility] ED requesting detoxification from opioids and agreed to initiation of Suboxone   Pt was subsequently admitted to the [facility] medical detox unit for medically assisted opioid withdrawal and MAT induction with Suboxone  Butrans patch 20 mcg was placed upon arrival to the unit with anticipated slow dose induction  On 11/25, patient tolerated microinduction  Pt was then stabilized on maintenance Suboxone 8 mg BID without complication, and Butrans patch subsequently removed  Patient was found to have microscopic hematuria  Urine culture, STD testing, and HIV testing were all negative, patient was referred to urology for further evaluation  Case management was consulted for assistance with disposition planning and patient will be discharged with planned outpatient follow up to [facility] program        Please see above list of diagnoses and related plan for additional information  Condition at Discharge: good     Discharge Day Visit / Exam:     Subjective:  Patient tolerated microinduction and is doing well  No acute complaints  Vitals: Blood Pressure: 134/79 (11/25/22 1930)  Pulse: 85 (11/25/22 1930)  Temperature: 98 7 °F (37 1 °C) (11/25/22 1930)  Temp Source: Temporal (11/25/22 1930)  Respirations: 22 (11/25/22 1930)  Height: 5' 9" (175 3 cm) (11/23/22 2235)  Weight - Scale: 77 1 kg (170 lb) (11/23/22 2235)  SpO2: 98 % (11/25/22 1930)  Exam:   Physical Exam  Vitals and nursing note reviewed  HENT:      Head: Normocephalic and atraumatic  Mouth/Throat:      Mouth: Mucous membranes are moist    Eyes:      Extraocular Movements: Extraocular movements intact  Pupils: Pupils are equal, round, and reactive to light  Cardiovascular:      Rate and Rhythm: Normal rate  Pulmonary:      Effort: Pulmonary effort is normal    Abdominal:      General: Abdomen is flat  Musculoskeletal:         General: Normal range of motion  Cervical back: Normal range of motion  Skin:     General: Skin is warm and dry  Neurological:      General: No focal deficit present  Mental Status: He is alert and oriented to person, place, and time     Psychiatric:         Mood and Affect: Mood normal  Behavior: Behavior normal          Thought Content: Thought content normal          Discussion with Family: patient     Discharge instructions/Information to patient and family:   See after visit summary for information provided to patient and family  Provisions for Follow-Up Care:  See after visit summary for information related to follow-up care and any pertinent home health orders  Disposition:     Home    For Discharges to [facility] SNF:   · Not Applicable to this Patient - Not Applicable to this Patient    Planned Readmission: NA     Discharge Statement:  I spent 35 minutes discharging the patient  This time was spent on the day of discharge  I had direct contact with the patient on the day of discharge  Greater than 50% of the total time was spent examining patient, answering all patient questions, arranging and discussing plan of care with patient as well as directly providing post-discharge instructions  Additional time then spent on discharge activities  Discharge Medications:  See after visit summary for reconciled discharge medications provided to patient and family        ** Please Note: This note has been constructed using a voice recognition system **

## 2022-11-26 NOTE — CASE MANAGEMENT
Case Management Discharge Planning Note    Patient name Andres Fearing  Location 5T DETOX 508/5T DETOX 50* MRN [id number]  : 1985 Date 2022       Current Admission Date: 2022  Current Admission Diagnosis:Opioid use disorder, severe, dependence [facility])   Patient Active Problem List    Diagnosis Date Noted   • Microscopic hematuria 2022   • Opioid use disorder, severe, dependence ([facility] ) 2022   • Cutaneous abscess of right knee 10/05/2020   • Hyponatremia 2018   • Tobacco dependence 2018   • Acute kidney injury ([facility] ) 2018   • Chest pain 2018   • Drug abuse ([facility] ) 2018   • [facility] 2018   • Anxiety 2018   • Polysubstance dependence including opioid type drug with complication, continuous use ([facility] ) 2018   • Melena 2018   • Acute blood loss anemia 2018      LOS (days): 3  Geometric Mean LOS (GMLOS) (days):   Days to GMLOS:     OBJECTIVE:  Risk of Unplanned Readmission Score: 14 93         Current admission status: Inpatient   Preferred Pharmacy:   [id number], [address], [address], [address] [address]  Phone: [phone] Fax: [phone]    [facility] [address] [place] [id number]  Phone: [phone] Fax: [phone]    [id number] [place], [place] [address]  [address] [address]  Phone: [phone] Fax: [phone]    Primary Care Provider: Quang Spencer MD    Primary Insurance: [address] [place] [address]  Secondary Insurance:     DISCHARGE DETAILS: Pt to be discharged home, reports that he has arranged his own transportation  CM reviewed discharge plan with pt; pt will follow up with his PCP and [facility] program for MAT       Discharge planning discussed with[de-identified] Patient  Freedom of Choice: Yes                   Contacts  Patient Contacts: [facility]  Relationship to Patient[de-identified] Treatment Provider  Contact Method: Phone  Phone Number: [phone]  Reason/Outcome: Referral, Continuity of Care              Other Referral/Resources/Interventions Provided:  Referrals Provided[de-identified] Crisis Hotline, IOP, Peer Specialist, Support Group, Therapist (inpatient substance use treatment)    Would you like to participate in our [facility] service program?  : No - Declined                                              Family notified[de-identified] Pt declined to sign ROIs for supportive contacts

## 2022-11-26 NOTE — NURSING NOTE
Pt's belongings returned in full  Discharge paper work discussed and pt verbalized understanding  Prescriptions sent to pt pharmacy  No IV line present  Pt escorted to exit via staff

## 2022-11-26 NOTE — PLAN OF CARE
Problem: Potential for Falls  Goal: Patient will remain free of falls  Description: INTERVENTIONS:  - Educate patient/family on patient safety including physical limitations  - Instruct patient to call for assistance with activity   - Consult OT/PT to assist with strengthening/mobility   - Keep Call bell within reach  - Keep bed low and locked with side rails adjusted as appropriate  - Keep care items and personal belongings within reach  - Initiate and maintain comfort rounds  - Make Fall Risk Sign visible to staff  - Apply yellow socks and bracelet for high fall risk patients  - Consider moving patient to room near nurses station  Outcome: Progressing     Problem: SUBSTANCE USE/ABUSE  Goal: By discharge, will develop insight into their chemical dependency and sustain motivation to continue in recovery  Description: INTERVENTIONS:  - Attends all daily group sessions and scheduled AA groups  - Actively practices coping skills through participation in the therapeutic community and adherence to program rules  - Reviews and completes assignments from individual treatment plan  - Assist patient development of understanding of their personal cycle of addiction and relapse triggers  Outcome: Progressing  Goal: By discharge, patient will have ongoing treatment plan addressing chemical dependency  Description: INTERVENTIONS:  - Assist patient with resources and/or appointments for ongoing recovery based living  Outcome: Progressing     Problem: INFECTION - ADULT  Goal: Absence or prevention of progression during hospitalization  Description: INTERVENTIONS:  - Assess and monitor for signs and symptoms of infection  - Monitor lab/diagnostic results  - Monitor all insertion sites, i e  indwelling lines, tubes, and drains  - Monitor endotracheal if appropriate and nasal secretions for changes in amount and color  - [place]ick appropriate cooling/warming therapies per order  - Administer medications as ordered  - Instruct and encourage patient and family to use good hand hygiene technique  - Identify and instruct in appropriate isolation precautions for identified infection/condition  Outcome: Progressing  Goal: Absence of fever/infection during neutropenic period  Description: INTERVENTIONS:  - Monitor WBC    Outcome: Progressing     Problem: SAFETY ADULT  Goal: Patient will remain free of falls  Description: INTERVENTIONS:  - Educate patient/family on patient safety including physical limitations  - Instruct patient to call for assistance with activity   - Consult OT/PT to assist with strengthening/mobility   - Keep Call bell within reach  - Keep bed low and locked with side rails adjusted as appropriate  - Keep care items and personal belongings within reach  - Initiate and maintain comfort rounds  - Make Fall Risk Sign visible to staff  - Apply yellow socks and bracelet for high fall risk patients  - Consider moving patient to room near nurses station  Outcome: Progressing  Goal: Maintain or return to baseline ADL function  Description: INTERVENTIONS:  -  Assess patient's ability to carry out ADLs; assess patient's baseline for ADL function and identify physical deficits which impact ability to perform ADLs (bathing, care of mouth/teeth, toileting, grooming, dressing, etc )  - Assess/evaluate cause of self-care deficits   - Assess range of motion  - Assess patient's mobility; develop plan if impaired  - Assess patient's need for assistive devices and provide as appropriate  - Encourage maximum independence but intervene and supervise when necessary  - Involve family in performance of ADLs  - Assess for home care needs following discharge   - Consider OT consult to assist with ADL evaluation and planning for discharge  - Provide patient education as appropriate  Outcome: Progressing  Goal: Maintains/Returns to pre admission functional level  Description: INTERVENTIONS:  - Perform BMAT or MOVE assessment daily    - Set and communicate daily mobility goal to care team and patient/family/caregiver  - Collaborate with rehabilitation services on mobility goals if consulted  - Out of bed for toileting  - Record patient progress and toleration of activity level   Outcome: Progressing     Problem: DISCHARGE PLANNING  Goal: Discharge to home or other facility with appropriate resources  Description: INTERVENTIONS:  - Identify barriers to discharge w/patient and caregiver  - Arrange for needed discharge resources and transportation as appropriate  - Identify discharge learning needs (meds, wound care, etc )  - Arrange for interpretive services to assist at discharge as needed  - Refer to Case Management Department for coordinating discharge planning if the patient needs post-hospital services based on physician/advanced practitioner order or complex needs related to functional status, cognitive ability, or social support system  Outcome: Progressing     Problem: Knowledge Deficit  Goal: Patient/family/caregiver demonstrates understanding of disease process, treatment plan, medications, and discharge instructions  Description: Complete learning assessment and assess knowledge base    Interventions:  - Provide teaching at level of understanding  - Provide teaching via preferred learning methods  Outcome: Progressing

## 2022-11-29 ENCOUNTER — TRANSITIONAL CARE MANAGEMENT (OUTPATIENT)
Dept: FAMILY MEDICINE CLINIC | Facility: CLINIC | Age: 37
End: 2022-11-29

## 2022-11-29 NOTE — UTILIZATION REVIEW
NOTIFICATION OF ADMISSION DISCHARGE   This is a Notification of Discharge from [address]  Please be advised that this patient has been discharge from our facility  Below you will find the admission and discharge date and time including the patient’s disposition  UTILIZATION REVIEW CONTACT:  Howard Velasquez MA  Utilization   Network Utilization Review Department  Phone: [phone] x carefully listen to the prompts  All voicemails are confidential   Email: [email or web address]     ADMISSION INFORMATION  PRESENTATION DATE: 11/23/2022  7:03 PM  OBERVATION ADMISSION DATE:   INPATIENT ADMISSION DATE: 11/23/22 11:08 PM   DISCHARGE DATE: 11/26/2022  8:56 AM   DISPOSITION:Home/Self Care    IMPORTANT INFORMATION:  Send all requests for admission clinical reviews, approved or denied determinations and any other requests to dedicated fax number below belonging to the campus where the patient is receiving treatment   List of dedicated fax numbers:  [address] DENIALS (Administrative/Medical Necessity) [phone]   [address] (Maternity/NICU/Pediatrics) [phone]   [facility] [phone]   [facility] [phone]   [address] [phone]   220 [facility] [phone] [address] [facility] [phone]   [address] [place] [address] 119 [phone]   [facility]  [phone]   [facility] [phone]   [address] [phone]

## 2023-01-28 ENCOUNTER — HOSPITAL ENCOUNTER (EMERGENCY)
Facility: HOSPITAL | Age: 38
Discharge: HOME | End: 2023-01-28
Attending: EMERGENCY MEDICINE

## 2023-01-28 VITALS
RESPIRATION RATE: 18 BRPM | HEART RATE: 84 BPM | DIASTOLIC BLOOD PRESSURE: 70 MMHG | TEMPERATURE: 97.9 F | BODY MASS INDEX: 27.32 KG/M2 | OXYGEN SATURATION: 100 % | SYSTOLIC BLOOD PRESSURE: 122 MMHG | WEIGHT: 185 LBS

## 2023-01-28 DIAGNOSIS — F11.10 HEROIN ABUSE (HCC): Primary | ICD-10-CM

## 2023-01-28 DIAGNOSIS — Z76.0 ENCOUNTER FOR MEDICATION REFILL: ICD-10-CM

## 2023-01-28 RX ORDER — BUPRENORPHINE HYDROCHLORIDE AND NALOXONE HYDROCHLORIDE DIHYDRATE 8; 2 MG/1; MG/1
1 TABLET SUBLINGUAL 2 TIMES DAILY
Qty: 28 TABLET | Refills: 0 | Status: SHIPPED | OUTPATIENT
Start: 2023-01-28 | End: 2023-02-11
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
## 2023-01-30 ENCOUNTER — TELEPHONE (OUTPATIENT)
Dept: PSYCHIATRY | Facility: CLINIC | Age: 38
End: 2023-01-30

## 2023-01-30 NOTE — TELEPHONE ENCOUNTER
Received referral from Detox and Sublocade order via fax  Attempted to call patient to schedule outpatient appointments - Had to leave a voicemail  Sublocade order scanned into chart

## 2023-02-13 ENCOUNTER — HOSPITAL ENCOUNTER (EMERGENCY)
Facility: HOSPITAL | Age: 38
Discharge: HOME | End: 2023-02-13
Attending: EMERGENCY MEDICINE

## 2023-02-13 VITALS
TEMPERATURE: 97.2 F | SYSTOLIC BLOOD PRESSURE: 126 MMHG | RESPIRATION RATE: 16 BRPM | OXYGEN SATURATION: 98 % | DIASTOLIC BLOOD PRESSURE: 75 MMHG | HEART RATE: 64 BPM | WEIGHT: 184.97 LBS | BODY MASS INDEX: 27.31 KG/M2

## 2023-02-13 DIAGNOSIS — K08.89 PAIN, DENTAL: Primary | ICD-10-CM

## 2023-02-13 RX ORDER — KETOROLAC TROMETHAMINE 10 MG/1
10 TABLET, FILM COATED ORAL EVERY 6 HOURS PRN
Qty: 10 TABLET | Refills: 0 | Status: SHIPPED | OUTPATIENT
Start: 2023-02-13 | End: 2023-02-18

## 2023-02-13 RX ORDER — CLINDAMYCIN HYDROCHLORIDE 150 MG/1
150 CAPSULE ORAL EVERY 6 HOURS
Qty: 28 CAPSULE | Refills: 0 | Status: SHIPPED | OUTPATIENT
Start: 2023-02-13 | End: 2023-02-18

## 2023-02-13 RX ORDER — OXYCODONE HYDROCHLORIDE AND ACETAMINOPHEN 5; 325 MG/1; MG/1
1 TABLET ORAL ONCE
Status: COMPLETED | OUTPATIENT
Start: 2023-02-13 | End: 2023-02-13

## 2023-02-13 RX ADMIN — OXYCODONE HYDROCHLORIDE AND ACETAMINOPHEN 1 TABLET: 5; 325 TABLET ORAL at 08:38

## 2023-02-13 NOTE — ED PROVIDER NOTES
History  Chief Complaint   Patient presents with   • Dental Pain     Abscess tooth R upper scheduled for extraction 2/21     41 y/o M w PMH as listed presents for eval or R upper tooth pain for the past few days  No fevers, chills  No n/v  No gingival swelling  Pt has an appt w his dentist on 2/23/2023  Patient denies any neck pain  No nausea or vomiting  No trouble swallowing  History provided by:  Patient  Dental Pain  Associated symptoms: no congestion, no difficulty swallowing, no drooling, no facial pain, no facial swelling, no fever, no gum swelling, no headaches, no neck pain, no neck swelling, no oral bleeding and no oral lesions        None       Past Medical History:   Diagnosis Date   • Anxiety    • Drug abuse ([facility] )    • Leukocytosis 12/22/2018   • Substance abuse ([place] [address] )        History reviewed  No pertinent surgical history  Family History   Problem Relation Age of Onset   • Diabetes Brother    • Heart disease Maternal Grandmother      I have reviewed and agree with the history as documented  E-Cigarette/Vaping     E-Cigarette/Vaping Substances     Social History     Tobacco Use   • Smoking status: Every Day     Packs/day: 0 25     Types: Cigarettes   • Smokeless tobacco: Never   • Tobacco comments:     occationally   Substance Use Topics   • Alcohol use: Not Currently     Comment: social   • Drug use: Not Currently     Types: Methamphetamines, Fentanyl       Review of Systems   Constitutional: Negative for fever  HENT: Negative for congestion, drooling, facial swelling, mouth sores, nosebleeds, rhinorrhea, sneezing, sore throat and voice change  Eyes: Negative for photophobia, pain, discharge and itching  Respiratory: Negative for apnea, cough, choking, chest tightness, shortness of breath and stridor  Cardiovascular: Negative for chest pain and leg swelling  Gastrointestinal: Negative for abdominal distention, abdominal pain, diarrhea and vomiting     Endocrine: Negative for cold intolerance, heat intolerance, polydipsia and polyphagia  Genitourinary: Negative for difficulty urinating, dysuria, enuresis and hematuria  Musculoskeletal: Negative for arthralgias, back pain, joint swelling and neck pain  Skin: Negative for color change and pallor  Allergic/Immunologic: Negative for environmental allergies and food allergies  Neurological: Negative for dizziness, syncope, facial asymmetry and headaches  Hematological: Negative for adenopathy  Psychiatric/Behavioral: Negative for agitation and behavioral problems  Physical Exam  Physical Exam  Vitals and nursing note reviewed  Constitutional:       General: He is not in acute distress  Appearance: He is well-developed  HENT:      Head: Normocephalic and atraumatic  Nose: Nose normal       Mouth/Throat:      Mouth: Mucous membranes are moist       Comments: No evidence of gingivitis  No dental abscess  No tooth decay  Eyes:      Conjunctiva/sclera: Conjunctivae normal    Cardiovascular:      Rate and Rhythm: Normal rate and regular rhythm  Heart sounds: No murmur heard  Pulmonary:      Effort: Pulmonary effort is normal  No respiratory distress  Breath sounds: Normal breath sounds  Abdominal:      Palpations: Abdomen is soft  Tenderness: There is no abdominal tenderness  Musculoskeletal:         General: No swelling  Cervical back: Neck supple  Skin:     General: Skin is warm and dry  Capillary Refill: Capillary refill takes less than 2 seconds  Neurological:      Mental Status: He is alert     Psychiatric:         Mood and Affect: Mood normal          Vital Signs  ED Triage Vitals   Temperature Pulse Respirations Blood Pressure SpO2   02/13/23 0820 02/13/23 0820 02/13/23 0820 02/13/23 0822 02/13/23 0820   (!) 97 2 °F (36 2 °C) 64 16 126/75 98 %      Temp Source Heart Rate Source Patient Position - Orthostatic VS BP Location FiO2 (%)   02/13/23 0820 02/13/23 0820 -- 02/13/23 0820 --   Oral Monitor  Left arm       Pain Score       --                  Vitals:    02/13/23 0820 02/13/23 0822   BP:  126/75   Pulse: 64          Visual Acuity      ED Medications  Medications   oxyCODONE-acetaminophen (PERCOCET) 5-325 mg per tablet 1 tablet (has no administration in time range)       Diagnostic Studies  Results Reviewed     None                 No orders to display              Procedures  Procedures         ED Course     Plan is to start the patient on clindamycin and have him follow-up with dentist in the next few days  Patient agreed with the plan  SBIRT 20yo+    Flowsheet Row Most Recent Value   SBIRT (23 yo +)    In order to provide better care to our patients, we are screening all of our patients for alcohol and drug use  Would it be okay to ask you these screening questions? No Filed at: 02/13/2023 8133                    MDM    Disposition  Final diagnoses:   Pain, dental     Time reflects when diagnosis was documented in both MDM as applicable and the Disposition within this note     Time User Action Codes Description Comment    2/13/2023  8:36 AM Cherise Ojeda Add [K08 89] Pain, dental       ED Disposition     ED Disposition   Discharge    Condition   Stable    Date/Time   Mon Feb 13, 2023 [address] discharge to home/self care  Follow-up Information     Follow up With Specialties Details Why Contact Info    Elsa Hunter MD Family Medicine   [address]            Patient's Medications   Discharge Prescriptions    CLINDAMYCIN (CLEOCIN) 150 MG CAPSULE    Take 1 capsule (150 mg total) by mouth every 6 (six) hours for 5 days       Start Date: 2/13/2023 End Date: 2/18/2023       Order Dose: 150 mg       Quantity: 28 capsule    Refills: 0    KETOROLAC (TORADOL) 10 MG TABLET    Take 1 tablet (10 mg total) by mouth every 6 (six) hours as needed for moderate pain for up to 5 days       Start Date: 2/13/2023 End Date: 2/18/2023       Order Dose: 10 mg       Quantity: 10 tablet    Refills: 0       No discharge procedures on file      PDMP Review     None          ED Provider  Electronically Signed by           Denia Garibay DO  02/13/23 9424

## 2023-02-13 NOTE — DISCHARGE INSTRUCTIONS
Please follow-up with your dentist on the 23rd of February  Return to the ED if you experience worsening pain, swelling, fevers, chills

## 2023-02-16 ENCOUNTER — HOSPITAL ENCOUNTER (EMERGENCY)
Facility: HOSPITAL | Age: 38
Discharge: HOME | End: 2023-02-16
Attending: EMERGENCY MEDICINE

## 2023-02-16 VITALS
RESPIRATION RATE: 20 BRPM | WEIGHT: 184.7 LBS | OXYGEN SATURATION: 96 % | SYSTOLIC BLOOD PRESSURE: 112 MMHG | DIASTOLIC BLOOD PRESSURE: 70 MMHG | BODY MASS INDEX: 27.28 KG/M2 | HEART RATE: 110 BPM | TEMPERATURE: 98.6 F

## 2023-02-16 DIAGNOSIS — Z76.0 MEDICATION REFILL: Primary | ICD-10-CM

## 2023-02-16 RX ORDER — BUPRENORPHINE AND NALOXONE 8; 2 MG/1; MG/1
8 FILM, SOLUBLE BUCCAL; SUBLINGUAL 2 TIMES DAILY
Qty: 50 FILM | Refills: 0 | Status: SHIPPED | OUTPATIENT
Start: 2023-02-16 | End: 2023-02-27 | Stop reason: SDUPTHER

## 2023-02-17 NOTE — ED PROVIDER NOTES
History  Chief Complaint   Patient presents with   • Medication Refill     Looking for suboxone refill      HPI  Patient is a 52-year-old male past medical history of substance abuse, anxiety presenting today for medication refill for Suboxone  Patient reports that he has a past medical history of opioid abuse in December had a stay in an inpatient rehab facility and was started on Sublocade with plans to continue Sublocade as an outpatient however he has been unable to get his Sublocade shot until March 14  Since then he has been seen and started on Suboxone to help with withdrawal symptoms which he has been taking regularly  He reports that he ran out to the day of his Suboxone denies any withdrawal symptoms at this time and has otherwise been in his usual state of health  Per PDMP review patient had his Suboxone last refilled 3 days ago with a 3-day supply  He is currently on 8 mg twice daily  Prior to Admission Medications   Prescriptions Last Dose Informant Patient Reported? Taking? clindamycin (CLEOCIN) 150 mg capsule   No No   Sig: Take 1 capsule (150 mg total) by mouth every 6 (six) hours for 5 days   ketorolac (TORADOL) 10 mg tablet   No No   Sig: Take 1 tablet (10 mg total) by mouth every 6 (six) hours as needed for moderate pain for up to 5 days      Facility-Administered Medications: None       Past Medical History:   Diagnosis Date   • Anxiety    • Drug abuse ([facility] 75 )    • Leukocytosis 12/22/2018   • Substance abuse ([facility] 75 )        History reviewed  No pertinent surgical history  Family History   Problem Relation Age of Onset   • Diabetes Brother    • Heart disease Maternal Grandmother      I have reviewed and agree with the history as documented      E-Cigarette/Vaping     E-Cigarette/Vaping Substances     Social History     Tobacco Use   • Smoking status: Every Day     Packs/day: 0 25     Types: Cigarettes   • Smokeless tobacco: Never   • Tobacco comments:     occationally   Substance Use Topics • Alcohol use: Not Currently     Comment: social   • Drug use: Not Currently     Types: Methamphetamines, Fentanyl       Review of Systems   Constitutional: Negative for chills and fever  HENT: Negative for congestion, rhinorrhea and sore throat  Eyes: Negative for redness and visual disturbance  Respiratory: Negative for cough and shortness of breath  Cardiovascular: Negative for chest pain and palpitations  Gastrointestinal: Negative for constipation, diarrhea, nausea and vomiting  Genitourinary: Negative for dysuria and hematuria  Musculoskeletal: Negative for myalgias and neck pain  Skin: Negative for rash and wound  Allergic/Immunologic: Negative for immunocompromised state  Neurological: Negative for seizures and syncope  Psychiatric/Behavioral: Negative for confusion and suicidal ideas  Physical Exam  Physical Exam  Vitals and nursing note reviewed  Constitutional:       General: He is not in acute distress  Appearance: Normal appearance  He is well-developed  HENT:      Head: Normocephalic and atraumatic  No raccoon eyes  Right Ear: External ear normal       Left Ear: External ear normal       Nose: Nose normal  No congestion  Mouth/Throat:      Lips: Pink  Mouth: Mucous membranes are moist    Eyes:      General: Lids are normal  No scleral icterus  Extraocular Movements: Extraocular movements intact  Cardiovascular:      Rate and Rhythm: Normal rate and regular rhythm  Heart sounds: No murmur heard  No friction rub  Pulmonary:      Effort: Pulmonary effort is normal  No respiratory distress  Breath sounds: No wheezing or rhonchi  Abdominal:      General: Abdomen is flat  Palpations: Abdomen is soft  Tenderness: There is no abdominal tenderness  There is no guarding or rebound  Musculoskeletal:      Cervical back: Normal range of motion  No torticollis  Skin:     General: Skin is warm and dry        Coloration: Skin is not jaundiced  Findings: No rash  Neurological:      Mental Status: He is alert and oriented to person, place, and time  Mental status is at baseline  Psychiatric:         Behavior: Behavior normal  Behavior is cooperative  Vital Signs  ED Triage Vitals [02/16/23 1732]   Temperature Pulse Respirations Blood Pressure SpO2   98 6 °F (37 °C) (!) 110 20 112/70 96 %      Temp Source Heart Rate Source Patient Position - Orthostatic VS BP Location FiO2 (%)   Oral Monitor Sitting Left arm --      Pain Score       --           Vitals:    02/16/23 1732   BP: 112/70   Pulse: (!) 110   Patient Position - Orthostatic VS: Sitting         Visual Acuity      ED Medications  Medications - No data to display    Diagnostic Studies  Results Reviewed     None                 No orders to display              Procedures  Procedures         ED Course                                             MDM  Patient on arrival is ambulatory to room is in no acute distress, vital signs stable, afebrile  On exam lungs clear auscultation, heart without murmurs rubs or gallops abdomen soft nontender  PDMP reviewed has been taking his medication appropriately  We will provide a refill of his Suboxone also given number for certified    Will return to ED with any complications  Return precautions discussed  Disposition  Final diagnoses:   Medication refill     Time reflects when diagnosis was documented in both MDM as applicable and the Disposition within this note     Time User Action Codes Description Comment    2/16/2023  5:44 PM Germain Peacock Add [Z76 0] Medication refill       ED Disposition     ED Disposition   Discharge    Condition   Stable    Date/Time   Thu Feb 16, 2023  5:44 PM    [address] discharge to home/self care                 Follow-up Information     Follow up With Specialties Details Why Contact Info Additional [address], MD Family Medicine   [address] [facility] [address]       [facility] Toxicology Medication-Assisted Treatment [facility] Schedule an appointment as soon as possible for a visit in 1 day for assistance with addiction / drug use [address] [place] [phone]  [facility] Toxicology Medication-Assisted Treatment [place], [address] [place] [address], [address], [place], [place], [address], [phone]          Discharge Medication List as of 2/16/2023  5:48 PM      START taking these medications    Details   buprenorphine-naloxone (Suboxone) 8-2 mg Place 1 Film (8 mg total) under the tongue 2 (two) times a day for 25 days, Starting u 2/16/2023, Until Mon 3/13/2023, Normal         CONTINUE these medications which have NOT CHANGED    Details   clindamycin (CLEOCIN) 150 mg capsule Take 1 capsule (150 mg total) by mouth every 6 (six) hours for 5 days, Starting Mon 2/13/2023, Until Sat 2/18/2023, Normal      ketorolac (TORADOL) 10 mg tablet Take 1 tablet (10 mg total) by mouth every 6 (six) hours as needed for moderate pain for up to 5 days, Starting Mon 2/13/2023, Until Sat 2/18/2023 at 2359, Normal             No discharge procedures on file      PDMP Review     None          ED Provider  Electronically Signed by           Amilcar Porter MD  02/16/23 2022

## 2023-02-27 ENCOUNTER — HOSPITAL ENCOUNTER (EMERGENCY)
Facility: HOSPITAL | Age: 38
Discharge: HOME | End: 2023-02-27
Attending: EMERGENCY MEDICINE

## 2023-02-27 VITALS
BODY MASS INDEX: 27.15 KG/M2 | WEIGHT: 183.86 LBS | DIASTOLIC BLOOD PRESSURE: 71 MMHG | SYSTOLIC BLOOD PRESSURE: 118 MMHG | TEMPERATURE: 98.7 F | HEART RATE: 101 BPM | RESPIRATION RATE: 16 BRPM | OXYGEN SATURATION: 98 %

## 2023-02-27 DIAGNOSIS — K08.89 PAIN, DENTAL: Primary | ICD-10-CM

## 2023-02-27 DIAGNOSIS — Z76.0 MEDICATION REFILL: ICD-10-CM

## 2023-02-27 RX ORDER — BUPRENORPHINE AND NALOXONE 8; 2 MG/1; MG/1
8 FILM, SOLUBLE BUCCAL; SUBLINGUAL 2 TIMES DAILY
Qty: 40 FILM | Refills: 0 | Status: SHIPPED | OUTPATIENT
Start: 2023-02-27 | End: 2023-02-27 | Stop reason: SDUPTHER

## 2023-02-27 RX ORDER — BUPRENORPHINE AND NALOXONE 8; 2 MG/1; MG/1
8 FILM, SOLUBLE BUCCAL; SUBLINGUAL 2 TIMES DAILY
Qty: 20 FILM | Refills: 0 | Status: SHIPPED | OUTPATIENT
Start: 2023-02-27 | End: 2023-03-09

## 2023-02-27 RX ORDER — BUPRENORPHINE AND NALOXONE 8; 2 MG/1; MG/1
8 FILM, SOLUBLE BUCCAL; SUBLINGUAL 2 TIMES DAILY
Qty: 40 FILM | Refills: 0 | Status: CANCELLED | OUTPATIENT
Start: 2023-02-27 | End: 2023-03-19

## 2023-02-28 NOTE — ED PROVIDER NOTES
History  Chief Complaint   Patient presents with   • Dental Pain     Pt states "I have an appointment with my dental clinic next week, but I ran out of my antibiotic and I have a really bad toothache (right side)  I also ran out of my suboxone, and don't have an appointment till March 8th with [address] [place] [address] "     Patient is a 81-year-old male complaining of 3 weeks of dental pain  He states he has an appointment next week with a clinic to have it taken out  He notes has been taking antibiotics for approximately 2 weeks which has not affected his pain at all  Still has sensitivity to touch cold and eating  No findings of his airway closing  No difficulty swallowing  No voice change  He said no fevers or chills  He also notes he has a history of opiate abuse for which she maintains his sobriety on Suboxone  He states that he has had difficulty getting to [facility] where he recieves his prescriptions from for a refill  He has noted recently in his medical record that he has needed to use the ER for bridging prescriptions  He is otherwise without complaint  Prior to Admission Medications   Prescriptions Last Dose Informant Patient Reported? Taking? buprenorphine-naloxone (Suboxone) 8-2 mg   No No   Sig: Place 1 Film (8 mg total) under the tongue 2 (two) times a day for 25 days   buprenorphine-naloxone (Suboxone) 8-2 mg   No Yes   Sig: Place 1 Film (8 mg total) under the tongue 2 (two) times a day for 20 days   ketorolac (TORADOL) 10 mg tablet   No No   Sig: Take 1 tablet (10 mg total) by mouth every 6 (six) hours as needed for moderate pain for up to 5 days      Facility-Administered Medications: None       Past Medical History:   Diagnosis Date   • Anxiety    • Drug abuse ([facility] )    • Leukocytosis 12/22/2018   • Substance abuse ([facility] )        History reviewed  No pertinent surgical history      Family History   Problem Relation Age of Onset   • Diabetes Brother    • Heart disease Maternal Grandmother I have reviewed and agree with the history as documented  E-Cigarette/Vaping     E-Cigarette/Vaping Substances     Social History     Tobacco Use   • Smoking status: Every Day     Packs/day: 0 25     Types: Cigarettes   • Smokeless tobacco: Never   • Tobacco comments:     occationally   Substance Use Topics   • Alcohol use: Not Currently     Comment: social   • Drug use: Not Currently     Types: Methamphetamines, Fentanyl       Review of Systems   Constitutional: Negative  Negative for chills and fever  HENT: Positive for dental problem  Negative for rhinorrhea, sore throat, trouble swallowing and voice change  Eyes: Negative  Negative for pain and visual disturbance  Respiratory: Negative  Negative for cough, shortness of breath and wheezing  Cardiovascular: Negative  Negative for chest pain and palpitations  Gastrointestinal: Negative for abdominal pain, diarrhea, nausea and vomiting  Genitourinary: Negative  Negative for dysuria and frequency  Musculoskeletal: Negative  Negative for neck pain and neck stiffness  Skin: Negative  Negative for rash  Neurological: Negative  Negative for dizziness, speech difficulty, weakness, light-headedness and numbness  Physical Exam  Physical Exam  Vitals and nursing note reviewed  Constitutional:       General: He is not in acute distress  Appearance: He is well-developed  HENT:      Head: Normocephalic and atraumatic  Mouth/Throat:      Lips: Pink  Mouth: Mucous membranes are moist       Dentition: Dental tenderness and dental caries present  No dental abscesses or gum lesions  Tongue: No lesions  Pharynx: Oropharynx is clear  Uvula midline  Tonsils: No tonsillar exudate  1+ on the right  1+ on the left  Eyes:      Conjunctiva/sclera: Conjunctivae normal       Pupils: Pupils are equal, round, and reactive to light  Neck:      Trachea: No tracheal deviation     Cardiovascular:      Rate and Rhythm: Normal rate and regular rhythm  Pulmonary:      Effort: Pulmonary effort is normal  No respiratory distress  Breath sounds: Normal breath sounds  No wheezing or rales  Abdominal:      General: Bowel sounds are normal  There is no distension  Palpations: Abdomen is soft  Tenderness: There is no abdominal tenderness  There is no guarding or rebound  Musculoskeletal:         General: No tenderness or deformity  Normal range of motion  Cervical back: Normal range of motion and neck supple  Skin:     General: Skin is warm and dry  Capillary Refill: Capillary refill takes less than 2 seconds  Findings: No rash  Neurological:      Mental Status: He is alert and oriented to person, place, and time  Psychiatric:         Behavior: Behavior normal          Vital Signs  ED Triage Vitals [02/27/23 1744]   Temperature Pulse Respirations Blood Pressure SpO2   98 7 °F (37 1 °C) 101 16 118/71 98 %      Temp Source Heart Rate Source Patient Position - Orthostatic VS BP Location FiO2 (%)   Oral -- Sitting Left arm --      Pain Score       --           Vitals:    02/27/23 1744   BP: 118/71   Pulse: 101   Patient Position - Orthostatic VS: Sitting         Visual Acuity      ED Medications  Medications - No data to display    Diagnostic Studies  Results Reviewed     None                 No orders to display              Procedures  Procedures         ED Course                               SBIRT 20yo+    Flowsheet Row Most Recent Value   SBIRT (25 yo +)    In order to provide better care to our patients, we are screening all of our patients for alcohol and drug use  Would it be okay to ask you these screening questions? No Filed at: 02/27/2023 3479                    Medical Decision Making  Patient is a 28-year-old male who presented for concerns of dental pain and medication management  Differential diagnosis includes but is not limited to dental caries, dental abscess, Ludewig's angina  Patient is well-appearing, no acute distress, afebrile on examination who gives no evidence of Reyes's angina as his soft palate and sublingual areas are soft  He does have poor dentition with dental caries but no obvious dental abscess or peritonsillar abscess being appreciated  I reviewed with him that he is already taken 2 weeks worth of antibiotics and this has not resolved his pain, suspicion that this is pain is secondary to an infection is extremely low, more likely that he has exposed nerve or irritation from dental caries and poor dentition  Advised him to continue nonnarcotic pain management with Tylenol or Motrin limiting to doses of Motrin of 600 mg every 6 hours and Tylenol dosing of 975 mg every 6 hours to avoid toxic effects to his liver and kidneys  I have also given him an additional prescription of his Suboxone for 20 days to allow for him to obtain prescriptions from his primary source in the future  He is also aware if he is unable to continue using [facility] for his [facility] assisted treatment for opioid use disorder that he can have that managed through another facility locally  Patient verbalizes understanding and agreement with plan  Medication refill: acute illness or injury  Pain, dental: acute illness or injury  Risk  Prescription drug management  Disposition  Final diagnoses:   Medication refill   Pain, dental     Time reflects when diagnosis was documented in both MDM as applicable and the Disposition within this note     Time User Action Codes Description Comment    2/27/2023  6:09 PM Tenisha Raymundo Add [Z76 0] Medication refill     2/27/2023  6:17 PM Tenisha Raymundo Add [K08 89] Pain, dental       ED Disposition     ED Disposition   Discharge    Condition   Stable    Date/Time   Mon Feb 27, 2023 [facility] discharge to home/self care                 Follow-up Information     Follow up With Specialties Details Why Contact Info Gilmore Schwab Dania Escobar MD Family Medicine   [address]  [facility]            Discharge Medication List as of 2/27/2023  6:17 PM      CONTINUE these medications which have CHANGED    Details   buprenorphine-naloxone (Suboxone) 8-2 mg Place 1 Film (8 mg total) under the tongue 2 (two) times a day for 20 days, Starting Mon 2/27/2023, Until Sun 3/19/2023, Normal         CONTINUE these medications which have NOT CHANGED    Details   ketorolac (TORADOL) 10 mg tablet Take 1 tablet (10 mg total) by mouth every 6 (six) hours as needed for moderate pain for up to 5 days, Starting Mon 2/13/2023, Until Sat 2/18/2023 at 2359, Normal             No discharge procedures on file      PDMP Review     None          ED Provider  Electronically Signed by           Brandon Jacob DO  02/28/23 0134

## 2023-03-28 ENCOUNTER — HOSPITAL ENCOUNTER (EMERGENCY)
Facility: HOSPITAL | Age: 38
Discharge: HOME | End: 2023-03-28
Attending: EMERGENCY MEDICINE | Admitting: EMERGENCY MEDICINE

## 2023-03-28 VITALS
WEIGHT: 177.2 LBS | SYSTOLIC BLOOD PRESSURE: 128 MMHG | OXYGEN SATURATION: 97 % | TEMPERATURE: 98.5 F | DIASTOLIC BLOOD PRESSURE: 66 MMHG | HEART RATE: 68 BPM | RESPIRATION RATE: 18 BRPM | BODY MASS INDEX: 26.17 KG/M2

## 2023-03-28 DIAGNOSIS — F11.90 OPIOID USE DISORDER: Primary | ICD-10-CM

## 2023-03-28 RX ORDER — BUPRENORPHINE AND NALOXONE 8; 2 MG/1; MG/1
8 FILM, SOLUBLE BUCCAL; SUBLINGUAL DAILY
Qty: 30 FILM | Refills: 0 | Status: SHIPPED | OUTPATIENT
Start: 2023-03-28 | End: 2023-04-27

## 2023-03-28 NOTE — ED PROVIDER NOTES
History  Chief Complaint   Patient presents with   • Medication Refill     Patient reports he needs a refill for suboxone  Attempted to receive refill from [facility]  States he has upcoming appt in April with [facility] but only has one dose left  44-year-old male presenting to ED requesting refill for Suboxone  Has outpatient substance abuse follow-up scheduled but not quick enough for his medications to last   Already has received multiple other Suboxone refills through the emergency department  Will provide 1 last 30-day refill  Denies recent relapse or drug use  Prior to Admission Medications   Prescriptions Last Dose Informant Patient Reported? Taking?   ketorolac (TORADOL) 10 mg tablet   No No   Sig: Take 1 tablet (10 mg total) by mouth every 6 (six) hours as needed for moderate pain for up to 5 days      Facility-Administered Medications: None       Past Medical History:   Diagnosis Date   • Anxiety    • Drug abuse ([facility] )    • Leukocytosis 12/22/2018   • Substance abuse ([facility] )        History reviewed  No pertinent surgical history  Family History   Problem Relation Age of Onset   • Diabetes Brother    • Heart disease Maternal Grandmother      I have reviewed and agree with the history as documented  E-Cigarette/Vaping     E-Cigarette/Vaping Substances     Social History     Tobacco Use   • Smoking status: Every Day     Packs/day: 0 25     Types: Cigarettes   • Smokeless tobacco: Never   • Tobacco comments:     occationally   Substance Use Topics   • Alcohol use: Not Currently     Comment: social   • Drug use: Not Currently     Types: Methamphetamines, Fentanyl       Review of Systems   Constitutional: Negative for chills and fever  HENT: Negative for ear pain and sore throat  Eyes: Negative for pain and visual disturbance  Respiratory: Negative for cough and shortness of breath  Cardiovascular: Negative for chest pain and palpitations     Gastrointestinal: Negative for abdominal pain and vomiting  Genitourinary: Negative for dysuria and hematuria  Musculoskeletal: Negative for arthralgias and back pain  Skin: Negative for color change and rash  Neurological: Negative for seizures and syncope  All other systems reviewed and are negative  Physical Exam  Physical Exam  Vitals and nursing note reviewed  Constitutional:       General: He is not in acute distress  Appearance: He is well-developed  HENT:      Head: Normocephalic and atraumatic  Eyes:      Conjunctiva/sclera: Conjunctivae normal    Cardiovascular:      Rate and Rhythm: Normal rate and regular rhythm  Heart sounds: No murmur heard  Pulmonary:      Effort: Pulmonary effort is normal  No respiratory distress  Breath sounds: Normal breath sounds  Abdominal:      Palpations: Abdomen is soft  Tenderness: There is no abdominal tenderness  Musculoskeletal:         General: No swelling  Cervical back: Neck supple  Skin:     General: Skin is warm and dry  Capillary Refill: Capillary refill takes less than 2 seconds  Neurological:      Mental Status: He is alert  Psychiatric:         Mood and Affect: Mood normal          Vital Signs  ED Triage Vitals [03/28/23 1912]   Temperature Pulse Respirations Blood Pressure SpO2   98 5 °F (36 9 °C) 68 18 128/66 97 %      Temp Source Heart Rate Source Patient Position - Orthostatic VS BP Location FiO2 (%)   Tympanic Monitor Sitting Left arm --      Pain Score       No Pain           Vitals:    03/28/23 1912   BP: 128/66   Pulse: 68   Patient Position - Orthostatic VS: Sitting         Visual Acuity      ED Medications  Medications - No data to display    Diagnostic Studies  Results Reviewed     None                 No orders to display              Procedures  Procedures         ED Course                                             Medical Decision Making   Already has received multiple other Suboxone refills through the emergency department    Will provide 1 last 30-day refill  Risk  Prescription drug management  Disposition  Final diagnoses:   Opioid use disorder     Time reflects when diagnosis was documented in both MDM as applicable and the Disposition within this note     Time User Action Codes Description Comment    3/28/2023  7:09 PM Rox Dumont Add [F11 90] Opioid use disorder       ED Disposition     ED Disposition   Discharge    Condition   Stable    Date/Time   Tue Mar 28, 2023  7:08 PM    Comment   Cole Oliveros discharge to home/self care                 Follow-up Information    None         Discharge Medication List as of 3/28/2023  7:11 PM      START taking these medications    Details   buprenorphine-naloxone (Suboxone) 8-2 mg Place 1 Film (8 mg total) under the tongue daily, Starting Tue 3/28/2023, Until Thu 4/27/2023, Normal         CONTINUE these medications which have NOT CHANGED    Details   ketorolac (TORADOL) 10 mg tablet Take 1 tablet (10 mg total) by mouth every 6 (six) hours as needed for moderate pain for up to 5 days, Starting Mon 2/13/2023, Until Sat 2/18/2023 at 2359, Normal                 PDMP Review     None          ED Provider  Electronically Signed by           Belinda Zhu MD  03/28/23 4117       Belinda Zhu MD  03/28/23 7643

## 2023-04-21 ENCOUNTER — HOSPITAL ENCOUNTER (EMERGENCY)
Facility: HOSPITAL | Age: 38
Discharge: HOME | End: 2023-04-21
Attending: STUDENT IN AN ORGANIZED HEALTH CARE EDUCATION/TRAINING PROGRAM | Admitting: STUDENT IN AN ORGANIZED HEALTH CARE EDUCATION/TRAINING PROGRAM

## 2023-04-21 VITALS
WEIGHT: 171.7 LBS | HEART RATE: 92 BPM | SYSTOLIC BLOOD PRESSURE: 118 MMHG | BODY MASS INDEX: 25.36 KG/M2 | RESPIRATION RATE: 20 BRPM | OXYGEN SATURATION: 98 % | DIASTOLIC BLOOD PRESSURE: 98 MMHG | TEMPERATURE: 98.7 F

## 2023-04-21 DIAGNOSIS — Z76.0 ENCOUNTER FOR MEDICATION REFILL: Primary | ICD-10-CM

## 2023-04-21 DIAGNOSIS — F11.90 OPIOID USE DISORDER: ICD-10-CM

## 2023-04-22 NOTE — ED PROVIDER NOTES
History  Chief Complaint   Patient presents with   • Medication Refill     Pt came to ER for suboxone RX  Pt reports he has appt on May 8 with [facility]  Pt ran out of suboxone  52-year-old male with past medical history of opioid use disorder presents to emergency department for Suboxone refill  States that he ran out yesterday  States that he does not have an appointment with [facility] until May 8  History provided by:  Patient and medical records  Medication Refill  Medications/supplies requested:  Suboxone  Reason for request:  Medications ran out  Medications taken before: yes - see home medications        Prior to Admission Medications   Prescriptions Last Dose Informant Patient Reported? Taking? buprenorphine-naloxone (Suboxone) 8-2 mg   No No   Sig: Place 1 Film (8 mg total) under the tongue daily   ketorolac (TORADOL) 10 mg tablet   No No   Sig: Take 1 tablet (10 mg total) by mouth every 6 (six) hours as needed for moderate pain for up to 5 days      Facility-Administered Medications: None       Past Medical History:   Diagnosis Date   • Anxiety    • Drug abuse ([place] [address] )    • Leukocytosis 12/22/2018   • Substance abuse ([place] [address] )        History reviewed  No pertinent surgical history  Family History   Problem Relation Age of Onset   • Diabetes Brother    • Heart disease Maternal Grandmother      I have reviewed and agree with the history as documented  E-Cigarette/Vaping     E-Cigarette/Vaping Substances     Social History     Tobacco Use   • Smoking status: Every Day     Packs/day: 0 25     Types: Cigarettes   • Smokeless tobacco: Never   • Tobacco comments:     occationally   Substance Use Topics   • Alcohol use: Not Currently     Comment: social   • Drug use: Not Currently     Types: Methamphetamines, Fentanyl       Review of Systems   Constitutional: Negative for chills and fever  Eyes: Negative for visual disturbance  Respiratory: Negative for shortness of breath      Cardiovascular: Negative for chest pain  Gastrointestinal: Negative for abdominal pain, diarrhea and vomiting  Musculoskeletal: Negative for myalgias  Neurological: Negative for dizziness, seizures and syncope  Psychiatric/Behavioral: Negative for hallucinations and suicidal ideas  All other systems reviewed and are negative  Physical Exam  Physical Exam  Vitals and nursing note reviewed  Constitutional:       General: He is awake  He is not in acute distress  Appearance: Normal appearance  HENT:      Head: Normocephalic and atraumatic  Mouth/Throat:      Lips: Pink  Mouth: Mucous membranes are moist    Eyes:      General: Vision grossly intact  Comments: Pinpoint pupils   Cardiovascular:      Rate and Rhythm: Normal rate and regular rhythm  Heart sounds: Normal heart sounds  Pulmonary:      Effort: Pulmonary effort is normal  No respiratory distress  Breath sounds: Normal breath sounds  Abdominal:      General: There is no distension  Skin:     General: Skin is warm and dry  Capillary Refill: Capillary refill takes less than 2 seconds  Findings: No rash  Neurological:      Mental Status: He is alert and oriented to person, place, and time  Gait: Gait normal    Psychiatric:      Comments: No SI or HI         Vital Signs  ED Triage Vitals [04/21/23 1642]   Temperature Pulse Respirations Blood Pressure SpO2   98 7 °F (37 1 °C) 92 20 118/98 98 %      Temp Source Heart Rate Source Patient Position - Orthostatic VS BP Location FiO2 (%)   Tympanic Monitor -- -- --      Pain Score       --           Vitals:    04/21/23 1642   BP: 118/98   Pulse: 92         Visual Acuity      ED Medications  Medications - No data to display    Diagnostic Studies  Results Reviewed     None                 No orders to display              Procedures  Procedures         ED Course  ED Course as of 04/21/23 2352   Fri Apr 21, 2023   1646 30 day prescription filled on 3/28/23   Has only been 24 days per PDMP  Will not refill at this time  [phone] Since we are unable to refill suboxone prescription, and patient is suspected to have used today, he was offered HOST referral  He agreed to HOST referral     Sundeep Shelton Texted    1723 Per RN, patient left                                LATRELL Crane's Most Recent Value   Initial Alcohol Screen: US AUDIT-C     1  How often do you have a drink containing alcohol? 0 Filed at: 04/21/2023 1643   2  How many drinks containing alcohol do you have on a typical day you are drinking? 0 Filed at: 04/21/2023 1643   3a  Male UNDER 65: How often do you have five or more drinks on one occasion? 0 Filed at: 04/21/2023 1643   3b  FEMALE Any Age, or MALE 65+: How often do you have 4 or more drinks on one occassion? 0 Filed at: 04/21/2023 1643   Audit-C Score 0 Filed at: 04/21/2023 1643   EYAL: How many times in the past year have you    Used an illegal drug or used a prescription medication for non-medical reasons? Never Filed at: 04/21/2023 1643                    Medical Decision Making  Per chart review patient came to ED in both February and March with similar stories about not having an appointment until the next month  Per chart review has not followed up  PDMP was reviewed, 30-day supply was filled on 3/28/2023  Patient is not due for refill  Additionally patient has not followed up with outpatient providers  Suspect possible opioid use today given behavior, pinpoint pupils  Patient denies  He was offered host referral and was agreeable  Order for warm handoff was placed  Patient eloped from the department after this discussion without my knowledge           Disposition  Final diagnoses:   Encounter for medication refill   Opioid use disorder     Time reflects when diagnosis was documented in both MDM as applicable and the Disposition within this note     Time User Action Codes Description Comment    4/21/2023  5:23 PM Jitendra Figueroa, Ether Drape Add [Z76 0] Encounter for medication refill     4/21/2023  5:23 PM Eduardo Shelley Add [F11 90] Opioid use disorder       ED Disposition     ED Disposition   Left from Room after Provider Exam    Condition   --    Date/Time   Fri Apr 21, 2023  5:24 PM    Comment   --         Follow-up Information    None         Discharge Medication List as of 4/21/2023  5:34 PM      CONTINUE these medications which have NOT CHANGED    Details   buprenorphine-naloxone (Suboxone) 8-2 mg Place 1 Film (8 mg total) under the tongue daily, Starting Tue 3/28/2023, Until Thu 4/27/2023, Normal      ketorolac (TORADOL) 10 mg tablet Take 1 tablet (10 mg total) by mouth every 6 (six) hours as needed for moderate pain for up to 5 days, Starting Mon 2/13/2023, Until Sat 2/18/2023 at 2359, Normal             No discharge procedures on file      PDMP Review     None          ED Provider  Electronically Signed by           Deepti Harris PA-C  04/21/23 3376

## 2023-07-22 ENCOUNTER — HOSPITAL ENCOUNTER (EMERGENCY)
Facility: HOSPITAL | Age: 38
Discharge: HOME | End: 2023-07-22
Attending: EMERGENCY MEDICINE
Payer: COMMERCIAL

## 2023-07-22 VITALS
TEMPERATURE: 98.1 F | WEIGHT: 169 LBS | SYSTOLIC BLOOD PRESSURE: 120 MMHG | DIASTOLIC BLOOD PRESSURE: 85 MMHG | HEART RATE: 89 BPM | BODY MASS INDEX: 24.96 KG/M2 | OXYGEN SATURATION: 99 % | RESPIRATION RATE: 20 BRPM

## 2023-07-22 DIAGNOSIS — H93.19 TINNITUS: ICD-10-CM

## 2023-07-22 DIAGNOSIS — F11.90 OPIOID USE DISORDER: Primary | ICD-10-CM

## 2023-07-22 PROCEDURE — 99284 EMERGENCY DEPT VISIT MOD MDM: CPT | Performed by: EMERGENCY MEDICINE

## 2023-07-22 PROCEDURE — 99284 EMERGENCY DEPT VISIT MOD MDM: CPT

## 2023-07-22 RX ORDER — BUPRENORPHINE AND NALOXONE 8; 2 MG/1; MG/1
8 FILM, SOLUBLE BUCCAL; SUBLINGUAL 2 TIMES DAILY
Qty: 60 FILM | Refills: 0 | Status: SHIPPED | OUTPATIENT
Start: 2023-07-22 | End: 2023-08-21

## 2023-07-22 NOTE — ED PROVIDER NOTES
History  Chief Complaint   Patient presents with   • Medical Problem     Patient reports while watching a movie last night , he kept getting an intermittent ringing in his head, "I'm not sure what that is, I couldn't;t hear anything but ringing, not sure if its from my tooth that I have to have removed, right upper side". Took 600mg of ibuprofen last night. No headache or ringing at this time. Denies blurry vision or nausea. 30-year-old male presenting the emergency department with ringing in his head. Patient notes he was watching a movie last night and had has had it intermittently over the last night. Currently not having any noise in his head. Patient notes that last opioid use 2 days ago has been out of Suboxone for 4 days, came in for refill but was not provided. No fever chills cough congestion rhinorrhea pain no chest pain shortness of breath. None       Past Medical History:   Diagnosis Date   • Anxiety    • Drug abuse ([address])    • Leukocytosis 12/22/2018   • Substance abuse ([address])        History reviewed. No pertinent surgical history. Family History   Problem Relation Age of Onset   • Diabetes Brother    • Heart disease Maternal Grandmother      I have reviewed and agree with the history as documented. E-Cigarette/Vaping   • E-Cigarette Use Never User      E-Cigarette/Vaping Substances     Social History     Tobacco Use   • Smoking status: Every Day     Packs/day: 0.25     Types: Cigarettes   • Smokeless tobacco: Never   • Tobacco comments:     occationally   Vaping Use   • Vaping Use: Never used   Substance Use Topics   • Alcohol use: Not Currently     Comment: social   • Drug use: Not Currently     Types: Methamphetamines, Fentanyl       Review of Systems   Constitutional: Negative for chills and fever. HENT: Negative for ear pain and sore throat. Eyes: Negative for pain and visual disturbance. Respiratory: Negative for cough and shortness of breath.     Cardiovascular: Negative for chest pain and palpitations. Gastrointestinal: Negative for abdominal pain and vomiting. Genitourinary: Negative for dysuria and hematuria. Musculoskeletal: Negative for arthralgias and back pain. Skin: Negative for color change and rash. Neurological: Negative for seizures and syncope. All other systems reviewed and are negative. Physical Exam  Physical Exam  Vitals and nursing note reviewed. Constitutional:       General: He is not in acute distress. Appearance: He is well-developed. HENT:      Head: Normocephalic and atraumatic. Right Ear: Tympanic membrane and ear canal normal.      Left Ear: Tympanic membrane and ear canal normal.   Eyes:      Conjunctiva/sclera: Conjunctivae normal.   Cardiovascular:      Rate and Rhythm: Normal rate and regular rhythm. Heart sounds: No murmur heard. Pulmonary:      Effort: Pulmonary effort is normal. No respiratory distress. Breath sounds: Normal breath sounds. Abdominal:      Palpations: Abdomen is soft. Tenderness: There is no abdominal tenderness. Musculoskeletal:         General: No swelling. Cervical back: Neck supple. Skin:     General: Skin is warm and dry. Capillary Refill: Capillary refill takes less than 2 seconds. Neurological:      Mental Status: He is alert.    Psychiatric:         Mood and Affect: Mood normal.         Vital Signs  ED Triage Vitals [07/22/23 0906]   Temperature Pulse Respirations Blood Pressure SpO2   98.1 °F (36.7 °C) 89 20 120/85 99 %      Temp Source Heart Rate Source Patient Position - Orthostatic VS BP Location FiO2 (%)   Oral Monitor Sitting Left arm --      Pain Score       --           Vitals:    07/22/23 0906   BP: 120/85   Pulse: 89   Patient Position - Orthostatic VS: Sitting         Visual Acuity      ED Medications  Medications - No data to display    Diagnostic Studies  Results Reviewed     None                 No orders to display Procedures  Procedures         ED Course                               SBIRT 20yo+    Flowsheet Row Most Recent Value   Initial Alcohol Screen: US AUDIT-C     1. How often do you have a drink containing alcohol? 0 Filed at: 07/22/2023 0909   2. How many drinks containing alcohol do you have on a typical day you are drinking? 0 Filed at: 07/22/2023 0909   3a. Male UNDER 65: How often do you have five or more drinks on one occasion? 0 Filed at: 07/22/2023 0909   3b. FEMALE Any Age, or MALE 65+: How often do you have 4 or more drinks on one occassion? 0 Filed at: 07/22/2023 5996   Audit-C Score 0 Filed at: 07/22/2023 5590                    Medical Decision Making  41-year-old male with high-pitched intermittent ringing in his head associated with some headache. Possibly secondary to opioid withdrawal, will initiate patient back on Suboxone. PCP follow-up. Ear exam normal bilaterally. Risk  Prescription drug management. Disposition  Final diagnoses:   Opioid use disorder   Tinnitus     Time reflects when diagnosis was documented in both MDM as applicable and the Disposition within this note     Time User Action Codes Description Comment    7/22/2023  9:23 AM Rod Morris Add [F11.90] Opioid use disorder     7/22/2023  9:25 AM Rod Morris Add [H93.19] Tinnitus       ED Disposition     ED Disposition   Discharge    Condition   Stable    Date/Time   Sat Jul 22, 2023  9:23 AM    Comment   Abdirasihd De discharge to home/self care. Follow-up Information     Follow up With Specialties Details Why Contact Info    Mojgan Major MD Family Medicine   [address] [place] [address]    [facility]            Discharge Medication List as of 7/22/2023  9:25 AM      START taking these medications    Details   buprenorphine-naloxone (Suboxone) 8-2 mg Place 1 Film (8 mg total) under the tongue 2 (two) times a day, Starting Sat 7/22/2023, Until Mon 8/21/2023, Normal No discharge procedures on file.     PDMP Review       Value Time User    PDMP Reviewed  Yes 7/20/2023  8:40 PM Albert Johnston PA-C          ED Provider  Electronically Signed by           Elizabeth Dewey MD  07/22/23 8356

## 2023-08-01 ENCOUNTER — TELEPHONE (OUTPATIENT)
Dept: OTHER | Age: 38
End: 2023-08-01

## 2023-08-01 NOTE — TELEPHONE ENCOUNTER
Second call to patient regarding referral.  MAT office number provided. Jacob Lewis called back was unable to hear him. Will call him back later today.

## 2023-08-31 ENCOUNTER — HOSPITAL ENCOUNTER (EMERGENCY)
Facility: HOSPITAL | Age: 38
Discharge: HOME | End: 2023-08-31
Attending: EMERGENCY MEDICINE
Payer: COMMERCIAL

## 2023-08-31 VITALS
WEIGHT: 159.1 LBS | BODY MASS INDEX: 23.49 KG/M2 | RESPIRATION RATE: 18 BRPM | DIASTOLIC BLOOD PRESSURE: 95 MMHG | TEMPERATURE: 97.5 F | HEART RATE: 73 BPM | SYSTOLIC BLOOD PRESSURE: 130 MMHG | OXYGEN SATURATION: 98 %

## 2023-08-31 DIAGNOSIS — Z76.0 MEDICATION REFILL: Primary | ICD-10-CM

## 2023-08-31 PROCEDURE — 99284 EMERGENCY DEPT VISIT MOD MDM: CPT | Performed by: EMERGENCY MEDICINE

## 2023-08-31 PROCEDURE — 99282 EMERGENCY DEPT VISIT SF MDM: CPT

## 2023-08-31 RX ORDER — BUPRENORPHINE AND NALOXONE 8; 2 MG/1; MG/1
8 FILM, SOLUBLE BUCCAL; SUBLINGUAL DAILY
Qty: 30 FILM | Refills: 0 | Status: SHIPPED | OUTPATIENT
Start: 2023-08-31 | End: 2023-08-31 | Stop reason: SDUPTHER

## 2023-08-31 RX ORDER — BUPRENORPHINE AND NALOXONE 8; 2 MG/1; MG/1
8 FILM, SOLUBLE BUCCAL; SUBLINGUAL 2 TIMES DAILY
Qty: 60 FILM | Refills: 0 | Status: SHIPPED | OUTPATIENT
Start: 2023-08-31 | End: 2023-09-30

## 2023-09-01 NOTE — ED PROVIDER NOTES
History  Chief Complaint   Patient presents with   • Medication Refill     Requesting refill of Suboxone      HPI  Patient is a 58-year-old male presenting with request to refill his Suboxone. Patient states that he has an appointment with the Suboxone clinic later this month. States that he just needs a refill until he gets to his appointment. States that last time he could not make it to the outpatient clinic due to relapsing and being in rehab. States that he will not make the same mistake this time. Notably patient has had repeated refills in the emergency department. Educated patient that returning to the ED for medication refill is not an appropriate use of department resources. Patient understands. Promised that this will be his last refill. Patient is currently asymptomatic. Does not have any other complaints    None       Past Medical History:   Diagnosis Date   • Anxiety    • Drug abuse ([address])    • Leukocytosis 12/22/2018   • Substance abuse ([address])        History reviewed. No pertinent surgical history. Family History   Problem Relation Age of Onset   • Diabetes Brother    • Heart disease Maternal Grandmother      I have reviewed and agree with the history as documented. E-Cigarette/Vaping   • E-Cigarette Use Never User      E-Cigarette/Vaping Substances     Social History     Tobacco Use   • Smoking status: Every Day     Packs/day: 0.25     Types: Cigarettes   • Smokeless tobacco: Never   • Tobacco comments:     occationally   Vaping Use   • Vaping Use: Never used   Substance Use Topics   • Alcohol use: Not Currently     Comment: social   • Drug use: Not Currently     Types: Methamphetamines, Fentanyl       Review of Systems   Constitutional: Negative for chills, diaphoresis, fever and unexpected weight change. HENT: Negative for ear pain and sore throat. Eyes: Negative for visual disturbance. Respiratory: Negative for cough, chest tightness and shortness of breath.     Cardiovascular: Negative for chest pain and leg swelling. Gastrointestinal: Negative for abdominal distention, abdominal pain, constipation, diarrhea, nausea and vomiting. Endocrine: Negative. Genitourinary: Negative for difficulty urinating and dysuria. Musculoskeletal: Negative. Skin: Negative. Allergic/Immunologic: Negative. Neurological: Negative. Hematological: Negative. Psychiatric/Behavioral: Negative. All other systems reviewed and are negative. Physical Exam  Physical Exam  Vitals and nursing note reviewed. Constitutional:       General: He is not in acute distress. Appearance: Normal appearance. He is not ill-appearing. HENT:      Head: Normocephalic and atraumatic. Right Ear: External ear normal.      Left Ear: External ear normal.      Nose: Nose normal.      Mouth/Throat:      Mouth: Mucous membranes are moist.      Pharynx: Oropharynx is clear. Eyes:      General: No scleral icterus. Right eye: No discharge. Left eye: No discharge. Extraocular Movements: Extraocular movements intact. Conjunctiva/sclera: Conjunctivae normal.      Pupils: Pupils are equal, round, and reactive to light. Cardiovascular:      Rate and Rhythm: Normal rate and regular rhythm. Pulses: Normal pulses. Heart sounds: Normal heart sounds. Pulmonary:      Effort: Pulmonary effort is normal.      Breath sounds: Normal breath sounds. Abdominal:      General: Abdomen is flat. Bowel sounds are normal. There is no distension. Palpations: Abdomen is soft. Tenderness: There is no abdominal tenderness. There is no guarding or rebound. Musculoskeletal:         General: Normal range of motion. Cervical back: Normal range of motion and neck supple. Skin:     General: Skin is warm and dry. Capillary Refill: Capillary refill takes less than 2 seconds. Neurological:      General: No focal deficit present.       Mental Status: He is alert and oriented to person, place, and time. Mental status is at baseline. Psychiatric:         Mood and Affect: Mood normal.         Behavior: Behavior normal.         Thought Content: Thought content normal.         Judgment: Judgment normal.         Vital Signs  ED Triage Vitals [08/31/23 2047]   Temperature Pulse Respirations Blood Pressure SpO2   97.5 °F (36.4 °C) 73 18 130/95 98 %      Temp Source Heart Rate Source Patient Position - Orthostatic VS BP Location FiO2 (%)   Tympanic Monitor Sitting Right arm --      Pain Score       --           Vitals:    08/31/23 2047   BP: 130/95   Pulse: 73   Patient Position - Orthostatic VS: Sitting         Visual Acuity      ED Medications  Medications - No data to display    Diagnostic Studies  Results Reviewed     None                 No orders to display              Procedures  Procedures         ED Course                               SBIRT 20yo+    Flowsheet Row Most Recent Value   Initial Alcohol Screen: US AUDIT-C     1. How often do you have a drink containing alcohol? 0 Filed at: 08/31/2023 2046   2. How many drinks containing alcohol do you have on a typical day you are drinking? 0 Filed at: 08/31/2023 2046   3a. Male UNDER 65: How often do you have five or more drinks on one occasion? 0 Filed at: 08/31/2023 2046   Audit-C Score 0 Filed at: 08/31/2023 2046   EYAL: How many times in the past year have you. .. Used an illegal drug or used a prescription medication for non-medical reasons? Never Filed at: 08/31/2023 2046                    Medical Decision Making  52-year-old male presenting with request for Suboxone refill  Patient agreed that this is his last refill and that he will ensure to make to his outpatient clinic appointment  Prescription filled  Patient discharged with return precautions provided    Medication refill: acute illness or injury  Risk  Prescription drug management.           Disposition  Final diagnoses:   Medication refill     Time reflects when diagnosis was documented in both MDM as applicable and the Disposition within this note     Time User Action Codes Description Comment    8/31/2023  9:22 PM Lc Dasilva Add [Z76.0] Medication refill       ED Disposition     ED Disposition   Discharge    Condition   Stable    Date/Time   Thu Aug 31, 2023  9:22 PM    [place] discharge to home/self care. Follow-up Information    None         Discharge Medication List as of 8/31/2023  9:44 PM      CONTINUE these medications which have CHANGED    Details   buprenorphine-naloxone (Suboxone) 8-2 mg Place 1 Film (8 mg total) under the tongue 2 (two) times a day, Starting Thu 8/31/2023, Until Sat 9/30/2023, Normal             No discharge procedures on file.     PDMP Review       Value Time User    PDMP Reviewed  Yes 7/20/2023  8:40 PM Elaina Metzger PA-C          ED Provider  Electronically Signed by           Lc Dasilva MD  09/02/23 6475

## 2023-09-19 ENCOUNTER — HOSPITAL ENCOUNTER (EMERGENCY)
Facility: HOSPITAL | Age: 38
Discharge: HOME | End: 2023-09-19
Attending: EMERGENCY MEDICINE
Payer: COMMERCIAL

## 2023-09-19 VITALS
RESPIRATION RATE: 14 BRPM | TEMPERATURE: 97.6 F | OXYGEN SATURATION: 95 % | DIASTOLIC BLOOD PRESSURE: 90 MMHG | SYSTOLIC BLOOD PRESSURE: 144 MMHG | HEART RATE: 89 BPM

## 2023-09-19 DIAGNOSIS — F19.10 POLYSUBSTANCE ABUSE (HCC): Primary | ICD-10-CM

## 2023-09-19 LAB
ATRIAL RATE: 73 BPM
P AXIS: 60 DEGREES
PR INTERVAL: 144 MS
QRS AXIS: 51 DEGREES
QRSD INTERVAL: 84 MS
QT INTERVAL: 426 MS
QTC INTERVAL: 469 MS
T WAVE AXIS: 12 DEGREES
VENTRICULAR RATE: 73 BPM

## 2023-09-19 PROCEDURE — 99284 EMERGENCY DEPT VISIT MOD MDM: CPT

## 2023-09-19 PROCEDURE — 93010 ELECTROCARDIOGRAM REPORT: CPT | Performed by: INTERNAL MEDICINE

## 2023-09-19 PROCEDURE — 99284 EMERGENCY DEPT VISIT MOD MDM: CPT | Performed by: EMERGENCY MEDICINE

## 2023-09-19 PROCEDURE — 93005 ELECTROCARDIOGRAM TRACING: CPT

## 2023-09-19 NOTE — ED PROVIDER NOTES
History  Chief Complaint   Patient presents with   • Overdose - Accidental     Patient arrived via EMS, pt reports using meth and 4 bags of fentanyl, patient regularly uses 2-3 bags was in police custody when EMS called due to possible OD     71-year-old male with history of substance abuse disorder brought in by EMS for concerns for possible overdose. Earlier tonight patient reports using methamphetamine and snorting fentanyl. Denies losing consciousness or ever receiving Narcan. According to EMS report to nursing staff, police showed up at patient's house to serve him a warrant when they noticed that he was drowsy and seemed ready to fall asleep. Police called EMS for ED evaluation. Prior to Admission Medications   Prescriptions Last Dose Informant Patient Reported? Taking? buprenorphine-naloxone (Suboxone) 8-2 mg   No No   Sig: Place 1 Film (8 mg total) under the tongue 2 (two) times a day      Facility-Administered Medications: None       Past Medical History:   Diagnosis Date   • Anxiety    • Drug abuse ([address])    • Leukocytosis 12/22/2018   • Substance abuse ([address])        History reviewed. No pertinent surgical history. Family History   Problem Relation Age of Onset   • Diabetes Brother    • Heart disease Maternal Grandmother      I have reviewed and agree with the history as documented. E-Cigarette/Vaping   • E-Cigarette Use Never User      E-Cigarette/Vaping Substances     Social History     Tobacco Use   • Smoking status: Every Day     Packs/day: 0.25     Types: Cigarettes   • Smokeless tobacco: Never   • Tobacco comments:     occationally   Vaping Use   • Vaping Use: Never used   Substance Use Topics   • Alcohol use: Not Currently     Comment: social   • Drug use: Not Currently     Types: Methamphetamines, Fentanyl        Review of Systems   Constitutional: Negative for chills and fever. HENT: Negative for ear pain and sore throat. Eyes: Negative for pain and visual disturbance. Respiratory: Negative for cough and shortness of breath. Cardiovascular: Negative for chest pain and palpitations. Gastrointestinal: Negative for abdominal pain and vomiting. Genitourinary: Negative for dysuria and hematuria. Musculoskeletal: Negative for arthralgias and back pain. Skin: Negative for color change and rash. Neurological: Negative for seizures and syncope. All other systems reviewed and are negative. Physical Exam  ED Triage Vitals   Temperature Pulse Respirations Blood Pressure SpO2   09/19/23 0230 09/19/23 0114 09/19/23 0114 09/19/23 0114 09/19/23 0114   97.6 °F (36.4 °C) 89 14 144/90 95 %      Temp Source Heart Rate Source Patient Position - Orthostatic VS BP Location FiO2 (%)   09/19/23 0230 09/19/23 0114 09/19/23 0114 09/19/23 0114 --   Tympanic Monitor Lying Right arm       Pain Score       --                    Orthostatic Vital Signs  Vitals:    09/19/23 0114   BP: 144/90   Pulse: 89   Patient Position - Orthostatic VS: Lying       Physical Exam  Vitals and nursing note reviewed. Constitutional:       General: He is not in acute distress. Appearance: Normal appearance. He is well-developed and normal weight. He is not ill-appearing, toxic-appearing or diaphoretic. HENT:      Head: Normocephalic and atraumatic. Right Ear: External ear normal.      Left Ear: External ear normal.      Nose: Nose normal.      Mouth/Throat:      Mouth: Mucous membranes are moist.      Pharynx: Oropharynx is clear. Eyes:      General:         Right eye: No discharge. Left eye: No discharge. Extraocular Movements: Extraocular movements intact. Conjunctiva/sclera: Conjunctivae normal.      Pupils: Pupils are equal, round, and reactive to light. Cardiovascular:      Rate and Rhythm: Normal rate and regular rhythm. Pulses: Normal pulses. Heart sounds: No murmur heard. Pulmonary:      Effort: Pulmonary effort is normal. No respiratory distress. Breath sounds: Normal breath sounds. Abdominal:      General: Abdomen is flat. Palpations: Abdomen is soft. Tenderness: There is no abdominal tenderness. Musculoskeletal:         General: No swelling, tenderness or signs of injury. Cervical back: Normal range of motion and neck supple. No rigidity or tenderness. Skin:     General: Skin is warm and dry. Capillary Refill: Capillary refill takes less than 2 seconds. Neurological:      Mental Status: He is alert and oriented to person, place, and time. Psychiatric:         Mood and Affect: Mood normal.         Behavior: Behavior normal.         ED Medications  Medications - No data to display    Diagnostic Studies  Results Reviewed     None                 No orders to display         Procedures  Procedures      ED Course                                       Medical Decision Making  52-year-old male with history of multiple substance abuse brought in by EMS for concerns for possible overdose. Based on history and physical examination there is no concerns for acute overdose at this time. Patient reports using meth and fentanyl greater than 4 hours ago. Will discharge patient into police custody. Polysubstance abuse [facility]): acute illness or injury        Disposition  Final diagnoses:   Polysubstance abuse ([address])     Time reflects when diagnosis was documented in both MDM as applicable and the Disposition within this note     Time User Action Codes Description Comment    9/19/2023  2:09 AM Magdalena Burgess Add [F19.10] Polysubstance abuse [facility])       ED Disposition     ED Disposition   Discharge    Condition   Stable    Date/Time   Tue Sep 19, 2023  2:09 AM    Comment   Mary Oliveros discharge to home/self care. Follow-up Information     Follow up With Specialties Details Why Contact Info    Francisco Spicer MD Family Medicine   [address] [place] [address]    [address] [place] [address]            Discharge Medication List as of 9/19/2023  2:09 AM      CONTINUE these medications which have NOT CHANGED    Details   buprenorphine-naloxone (Suboxone) 8-2 mg Place 1 Film (8 mg total) under the tongue 2 (two) times a day, Starting Thu 8/31/2023, Until Sat 9/30/2023, Normal           No discharge procedures on file. PDMP Review       Value Time User    PDMP Reviewed  Yes 7/20/2023  8:40 PM Adalberto Mirza PA-C           ED Provider  Attending physically available and evaluated Elizabeth Perez. I managed the patient along with the ED Attending.     Electronically Signed by         Birder Brittle, MD  09/22/23 9043

## 2023-09-19 NOTE — ED NOTES
Called [facility] per business card that was left when patient was brought in, when patient was discharged, the  states that they would not come get him due to warrant being in [address] [place] [address] so they will relay the message to [address] [place] [address] that he is being discharged from the hospital.     Elsy Sheridan RN  09/19/23 5410

## 2023-09-19 NOTE — ED ATTENDING ATTESTATION
9/19/2023  IAbhi MD, saw and evaluated the patient. I have discussed the patient with the resident/non-physician practitioner and agree with the resident's/non-physician practitioner's findings, Plan of Care, and MDM as documented in the resident's/non-physician practitioner's note, except where noted. All available labs and Radiology studies were reviewed. I was present for key portions of any procedure(s) performed by the resident/non-physician practitioner and I was immediately available to provide assistance. At this point I agree with the current assessment done in the Emergency Department. I have conducted an independent evaluation of this patient a history and physical is as follows:    52-year-old male with a history of polysubstance abuse presents to the emergency department via EMS for evaluation of possible accidental overdose. Please were reportedly serving a warrant when they arrived at the patient's residence noted that he was nodding off and called EMS. No medications were given prior to arrival.  Patient does admit to using fentanyl and methamphetamine at approximately 10 PM.  Denies any alcohol use. Denies any headache, fever, chills, chest pain, shortness of breath, abdominal pain, nausea or vomiting. On exam, patient was comfortably bed in no acute distress, vital signs reviewed and stable, head is normocephalic atraumatic, pupils equal round reactive to light, heart is regular rate and rhythm with intact distal pulses, no increased work of breathing, respiratory distress, or stridor. Full range of motion of all extremities. No focal deficits. MDM:  Polysubstance abuse: Patient is hemodynamically stable without any symptoms, do not believe any further work-up is necessary at this time. There is no signs of impending airway compromise. It has been hours since the last ingestion. Patient be discharged home.     ED Course         Critical Care Time  Procedures

## 2023-09-25 ENCOUNTER — HOSPITAL ENCOUNTER (EMERGENCY)
Facility: HOSPITAL | Age: 38
Discharge: HOME | End: 2023-09-25
Attending: EMERGENCY MEDICINE
Payer: COMMERCIAL

## 2023-09-25 VITALS
BODY MASS INDEX: 23.54 KG/M2 | SYSTOLIC BLOOD PRESSURE: 131 MMHG | TEMPERATURE: 98.5 F | OXYGEN SATURATION: 97 % | RESPIRATION RATE: 18 BRPM | DIASTOLIC BLOOD PRESSURE: 90 MMHG | WEIGHT: 159.39 LBS | HEART RATE: 126 BPM

## 2023-09-25 DIAGNOSIS — F11.10 OPIATE ABUSE, CONTINUOUS (HCC): Primary | ICD-10-CM

## 2023-09-25 PROCEDURE — 99283 EMERGENCY DEPT VISIT LOW MDM: CPT | Performed by: EMERGENCY MEDICINE

## 2023-09-25 PROCEDURE — 99283 EMERGENCY DEPT VISIT LOW MDM: CPT

## 2023-09-25 NOTE — ED PROVIDER NOTES
History  No chief complaint on file. Patient is a 52-year-old male brought in by [facility] for medical clearance. Patient snorted 4 bags of fentanyl 1.5 hours PTA. Normally uses 6 bags a day. Told officer he felt like he was "nodding off."  Officer did not have any narcan with him. Walked him in here for eval.  Patient without complaints at this time. No SI with use. Currently under police custoday. Prior to Admission Medications   Prescriptions Last Dose Informant Patient Reported? Taking? buprenorphine-naloxone (Suboxone) 8-2 mg   No No   Sig: Place 1 Film (8 mg total) under the tongue 2 (two) times a day      Facility-Administered Medications: None       Past Medical History:   Diagnosis Date   • Anxiety    • Drug abuse ([facility])    • Leukocytosis 12/22/2018   • Substance abuse ([facility])        No past surgical history on file. Family History   Problem Relation Age of Onset   • Diabetes Brother    • Heart disease Maternal Grandmother      I have reviewed and agree with the history as documented. E-Cigarette/Vaping   • E-Cigarette Use Never User      E-Cigarette/Vaping Substances     Social History     Tobacco Use   • Smoking status: Every Day     Packs/day: 0.25     Types: Cigarettes   • Smokeless tobacco: Never   • Tobacco comments:     occationally   Vaping Use   • Vaping Use: Never used   Substance Use Topics   • Alcohol use: Not Currently     Comment: social   • Drug use: Not Currently     Types: Methamphetamines, Fentanyl       Review of Systems   Constitutional: Negative. HENT: Negative. Eyes: Negative. Respiratory: Negative. Cardiovascular: Negative. Gastrointestinal: Negative. Endocrine: Negative. Genitourinary: Negative. Musculoskeletal: Negative. Skin: Negative. Allergic/Immunologic: Negative. Neurological: Negative. Hematological: Negative. Psychiatric/Behavioral: Negative. All other systems reviewed and are negative.       Physical Exam  Physical Exam  Vitals and nursing note reviewed. Constitutional:       Appearance: Normal appearance. He is normal weight. HENT:      Head: Normocephalic and atraumatic. Nose:      Comments: Fresh blood in left nare     Mouth/Throat:      Mouth: Mucous membranes are moist.      Pharynx: Oropharynx is clear. Cardiovascular:      Rate and Rhythm: Normal rate and regular rhythm. Heart sounds: Normal heart sounds. Pulmonary:      Effort: Pulmonary effort is normal.      Breath sounds: Normal breath sounds. Abdominal:      General: Bowel sounds are normal.      Palpations: Abdomen is soft. Musculoskeletal:         General: Normal range of motion. Cervical back: Normal range of motion and neck supple. Skin:     General: Skin is warm and dry. Capillary Refill: Capillary refill takes less than 2 seconds. Neurological:      General: No focal deficit present. Mental Status: He is alert and oriented to person, place, and time. Cranial Nerves: No cranial nerve deficit. Motor: No weakness. Gait: Gait normal.   Psychiatric:         Mood and Affect: Mood normal.         Behavior: Behavior normal.         Vital Signs  ED Triage Vitals   Temp Pulse Resp BP SpO2   -- -- -- -- --      Temp src Heart Rate Source Patient Position - Orthostatic VS BP Location FiO2 (%)   -- -- -- -- --      Pain Score       --           There were no vitals filed for this visit. Visual Acuity      ED Medications  Medications - No data to display    Diagnostic Studies  Results Reviewed     None                 No orders to display              Procedures  Procedures         ED Course  ED Course as of 09/25/23 2143   [facility] Sep 25, 2023   1641 Patient declining any narcan. Medical Decision Making  Opiate abuse, continuous [facility]): chronic illness or injury     Details: no signs of any toxidrome after obs. can be dc'd to state police custody. Amount and/or Complexity of Data Reviewed  Independent Historian:      Details: state police          Disposition  Final diagnoses:   None     ED Disposition     None      Follow-up Information    None         Patient's Medications   Discharge Prescriptions    No medications on file       No discharge procedures on file.     PDMP Review       Value Time User    PDMP Reviewed  Yes 7/20/2023  8:40 PM Mk Rothman PA-C          ED Provider  Electronically Signed by           Kathleen Long MD  09/25/23 0384

## 2024-01-21 NOTE — ED NOTES
Seen, assessed and discharged by provider         Hannah Garcia RN  02/16/23 2739
BUE/BLE grossly 3+/5/grossly assessed due to

## 2024-04-02 ENCOUNTER — HOSPITAL ENCOUNTER (EMERGENCY)
Facility: HOSPITAL | Age: 39
Discharge: HOME | End: 2024-04-02
Attending: EMERGENCY MEDICINE
Payer: COMMERCIAL

## 2024-04-02 VITALS
RESPIRATION RATE: 22 BRPM | HEART RATE: 82 BPM | SYSTOLIC BLOOD PRESSURE: 124 MMHG | TEMPERATURE: 97.4 F | OXYGEN SATURATION: 98 % | DIASTOLIC BLOOD PRESSURE: 58 MMHG

## 2024-04-02 DIAGNOSIS — L02.419 AXILLARY ABSCESS: Primary | ICD-10-CM

## 2024-04-02 PROCEDURE — 99284 EMERGENCY DEPT VISIT MOD MDM: CPT | Performed by: EMERGENCY MEDICINE

## 2024-04-02 PROCEDURE — 10061 I&D ABSCESS COMP/MULTIPLE: CPT | Performed by: EMERGENCY MEDICINE

## 2024-04-02 PROCEDURE — 76882 US LMTD JT/FCL EVL NVASC XTR: CPT | Performed by: EMERGENCY MEDICINE

## 2024-04-02 PROCEDURE — 99282 EMERGENCY DEPT VISIT SF MDM: CPT

## 2024-04-02 RX ORDER — DOXYCYCLINE HYCLATE 100 MG/1
100 CAPSULE ORAL ONCE
Status: COMPLETED | OUTPATIENT
Start: 2024-04-02 | End: 2024-04-02

## 2024-04-02 RX ORDER — ONDANSETRON 4 MG/1
4 TABLET, ORALLY DISINTEGRATING ORAL ONCE
Status: COMPLETED | OUTPATIENT
Start: 2024-04-02 | End: 2024-04-02

## 2024-04-02 RX ORDER — DOXYCYCLINE HYCLATE 100 MG/1
100 CAPSULE ORAL 2 TIMES DAILY
Qty: 14 CAPSULE | Refills: 0 | Status: SHIPPED | OUTPATIENT
Start: 2024-04-02 | End: 2024-04-09

## 2024-04-02 RX ORDER — LIDOCAINE HYDROCHLORIDE 10 MG/ML
10 INJECTION, SOLUTION EPIDURAL; INFILTRATION; INTRACAUDAL; PERINEURAL ONCE
Status: COMPLETED | OUTPATIENT
Start: 2024-04-02 | End: 2024-04-02

## 2024-04-02 RX ORDER — ONDANSETRON 4 MG/1
4 TABLET, ORALLY DISINTEGRATING ORAL EVERY 6 HOURS PRN
Qty: 6 TABLET | Refills: 0 | Status: SHIPPED | OUTPATIENT
Start: 2024-04-02

## 2024-04-02 RX ADMIN — ONDANSETRON 4 MG: 4 TABLET, ORALLY DISINTEGRATING ORAL at 20:18

## 2024-04-02 RX ADMIN — DOXYCYCLINE 100 MG: 100 CAPSULE ORAL at 20:18

## 2024-04-02 RX ADMIN — LIDOCAINE HYDROCHLORIDE 10 ML: 10 INJECTION, SOLUTION EPIDURAL; INFILTRATION; INTRACAUDAL; PERINEURAL at 20:18

## 2024-04-02 NOTE — ED PROVIDER NOTES
History  Chief Complaint   Patient presents with    Abscess     Pt states that last night he developed an abscess under his L axilla. Pt also states that he had a fever last night. Pt has a hx of MRSA.      Patient is a 38-year-old male with a significant past medical history of anxiety, previous drug abuse currently maintained on Suboxone which he is compliant with, MRSA, presenting for evaluation of a suspected abscess of his left axilla.  He reports that he first noticed this last night.  He has had some pain and swelling in the area.  He attempted to pop this but was unable to.  He has some mild noted redness over it without any warmth.  Patient otherwise feeling his normal self.  He has never had an abscess in this area.        None       Past Medical History:   Diagnosis Date    Anxiety     Drug abuse (HCC)     Leukocytosis 12/22/2018    Substance abuse (HCC)        History reviewed. No pertinent surgical history.    Family History   Problem Relation Age of Onset    Diabetes Brother     Heart disease Maternal Grandmother      I have reviewed and agree with the history as documented.    E-Cigarette/Vaping    E-Cigarette Use Never User      E-Cigarette/Vaping Substances     Social History     Tobacco Use    Smoking status: Every Day     Current packs/day: 0.25     Types: Cigarettes    Smokeless tobacco: Never    Tobacco comments:     occationally   Vaping Use    Vaping status: Never Used   Substance Use Topics    Alcohol use: Not Currently     Comment: social    Drug use: Yes     Types: Methamphetamines, Fentanyl     Comment: reports snorting 6 bags fentanyl daily        Review of Systems   Constitutional:  Negative for fever.   Gastrointestinal:  Negative for nausea and vomiting.   Skin:         Suspected abscess   Neurological:  Negative for numbness.       Physical Exam  ED Triage Vitals [04/02/24 1901]   Temperature Pulse Respirations Blood Pressure SpO2   (!) 97.4 °F (36.3 °C) 82 22 124/58 98 %      Temp  Source Heart Rate Source Patient Position - Orthostatic VS BP Location FiO2 (%)   Oral Monitor Sitting Right arm --      Pain Score       --             Orthostatic Vital Signs  Vitals:    04/02/24 1901   BP: 124/58   Pulse: 82   Patient Position - Orthostatic VS: Sitting       Physical Exam  Vitals and nursing note reviewed.   Constitutional:       General: He is not in acute distress.     Appearance: Normal appearance. He is not ill-appearing or toxic-appearing.   HENT:      Head: Normocephalic and atraumatic.      Right Ear: External ear normal.      Left Ear: External ear normal.      Nose: Nose normal.   Eyes:      General: No scleral icterus.        Right eye: No discharge.         Left eye: No discharge.      Extraocular Movements: Extraocular movements intact.      Conjunctiva/sclera: Conjunctivae normal.   Cardiovascular:      Rate and Rhythm: Normal rate.      Heart sounds: Normal heart sounds. No murmur heard.     No friction rub. No gallop.   Pulmonary:      Effort: Pulmonary effort is normal. No respiratory distress.      Breath sounds: Normal breath sounds.   Abdominal:      General: Abdomen is flat. There is no distension.      Palpations: Abdomen is soft. There is no mass.      Tenderness: There is no abdominal tenderness.   Genitourinary:     Comments: Deferred  Skin:     General: Skin is warm and dry.      Comments: There is an area of swelling and induration in the left axilla.  No overlying warmth.   Neurological:      General: No focal deficit present.      Mental Status: He is alert.         ED Medications  Medications   lidocaine (PF) (XYLOCAINE-MPF) 1 % injection 10 mL (10 mL Infiltration Given by Other 4/2/24 2018)   doxycycline hyclate (VIBRAMYCIN) capsule 100 mg (100 mg Oral Given 4/2/24 2018)   ondansetron (ZOFRAN-ODT) dispersible tablet 4 mg (4 mg Oral Given 4/2/24 2018)       Diagnostic Studies  Results Reviewed       None                   No orders to display         Procedures  POC  "MSK/Soft Tissue US    Date/Time: 4/2/2024 8:09 PM    Performed by: Geovanny Conde DO  Authorized by: Geovanny Cnode DO    Patient location:  ED  Performed by:  Resident  Other Assisting Provider: No    Procedure:     Performed: soft tissue ultrasound    Procedure details:     Exam Type:  Diagnostic    Longitudinal view:  Obtained    Image quality: diagnostic      Image availability:  Images available in PACS  Soft tissue ultrasound:     Soft tissue indications: suspected abscess      Anatomic location:  Axilla    Soft tissue findings: subcutaneous collection (comment)    Interpretation:     Soft tissue impressions: consistent with abscess    Incision and drain    Date/Time: 4/2/2024 8:21 PM    Performed by: Geovanny Conde DO  Authorized by: Geovanny Conde DO  Universal Protocol:  Consent: Verbal consent obtained.  Risks and benefits: risks, benefits and alternatives were discussed  Consent given by: patient  Time out: Immediately prior to procedure a \"time out\" was called to verify the correct patient, procedure, equipment, support staff and site/side marked as required.  Patient understanding: patient states understanding of the procedure being performed  Patient identity confirmed: verbally with patient    Patient location:  ED  Location:     Type:  Abscess    Location: Left axilla.  Pre-procedure details:     Skin preparation:  Chloraprep  Anesthesia (see MAR for exact dosages):     Anesthesia method:  Local infiltration    Local anesthetic:  Lidocaine 1% w/o epi  Procedure details:     Complexity:  Simple    Incision types:  Stab incision    Scalpel blade:  11    Approach:  Open    Incision depth:  Subcutaneous    Wound management:  Probed and deloculated and irrigated with saline    Drainage:  Purulent    Drainage amount:  Moderate    Wound treatment:  Wound left open    Packing materials:  None  Post-procedure details:     Patient tolerance of procedure:  Tolerated well, no " immediate complications        ED Course                                       Medical Decision Making  Patient is a 38 year old male presenting with a painful fluid pocket with fluctuance and surrounding induration and erythema, concerning for an abscess.  Confirmed on bedside ultrasound.  The abscess was anesthetized with lidocaine and then I&D was performed with deloculation and purulence was expressed.  Given his history of MRSA, patient initiated on doxycycline.  Additional sent to pharmacy along with Zofran for nausea control associated with this medication.    Disposition: Discharged with instructions to obtain outpatient follow up of patient's symptoms and findings, with strict return precautions if patient develops new or worsening symptoms. Patient understands this plan and is agreeable. All questions answered. Patient discharged home with return precautions.    Risk  Prescription drug management.          Disposition  Final diagnoses:   Axillary abscess     Time reflects when diagnosis was documented in both MDM as applicable and the Disposition within this note       Time User Action Codes Description Comment    4/2/2024  8:58 PM Geovanny Conde Add [L02.419] Axillary abscess           ED Disposition       ED Disposition   Discharge    Condition   Stable    Date/Time   Tue Apr 2, 2024  8:58 PM    Comment   Pravin Oliveros discharge to home/self care.                   Follow-up Information       Follow up With Specialties Details Why Contact Info    [facility]  Call  To find a primary care doctor [phone]              Discharge Medication List as of 4/2/2024  8:59 PM        START taking these medications    Details   doxycycline hyclate (VIBRAMYCIN) 100 mg capsule Take 1 capsule (100 mg total) by mouth 2 (two) times a day for 7 days, Starting Tue 4/2/2024, Until Tue 4/9/2024, Normal      ondansetron (ZOFRAN-ODT) 4 mg disintegrating tablet Take 1 tablet (4 mg total) by mouth every 6 (six) hours as  needed for nausea or vomiting for up to 6 doses, Starting Tue 4/2/2024, Normal           No discharge procedures on file.    PDMP Review         Value Time User    PDMP Reviewed  Yes 7/20/2023  8:40 PM Patricia Beard PA-C             ED Provider  Attending physically available and evaluated Pravin Oliveros. I managed the patient along with the ED Attending.    Electronically Signed by           Geovanny Conde DO  04/03/24 0904

## 2024-04-03 NOTE — DISCHARGE INSTRUCTIONS
You were evaluated in the ER for an abscess. Please keep the area surrounding the abscess clean and dry. You will be given a prescription for antibiotics, please take the antibiotics as directed for the full course of the medication.    Please schedule an appointment with your primary care physician as soon as possible for follow up.    Return to the Emergency Department if you experience fevers greater than 100.4F, increase in area of redness or swelling, increasing amount of discharge from the area, increased tenderness around the area, or any other concerning symptoms.    Thank you for choosing us for your care.

## 2024-04-03 NOTE — ED ATTENDING ATTESTATION
4/2/2024  I, Noel Morocho DO, saw and evaluated the patient. I have discussed the patient with the resident/non-physician practitioner and agree with the resident's/non-physician practitioner's findings, Plan of Care, and MDM as documented in the resident's/non-physician practitioner's note, except where noted. All available labs and Radiology studies were reviewed.  I was present for key portions of any procedure(s) performed by the resident/non-physician practitioner and I was immediately available to provide assistance.       At this point I agree with the current assessment done in the Emergency Department.  I have conducted an independent evaluation of this patient a history and physical is as follows:    Patient is a 38-year-old male with a history of anxiety, previous opiate abuse currently on Suboxone, history of previous facial MRSA infections which did require incision and drainage, accompanied by a male friend.  Patient noticed last night some discomfort and swelling in his left axilla.  He does not shave the area but occasionally uses clippers.  He has no fever, no chills, no trauma, otherwise doing well.    General:  Patient is well-appearing  Head:  Atraumatic  Eyes:  Conjunctiva pink  ENT:  Mucous membranes are moist  Neck:  Supple  Extremities: The patient's left axilla is a 3 cm x 2 cm oval shaped area of indurated thickened skin, slightly raised.  No relying or surrounding cellulitis, no fluctuant, no purulent drainage or discharge noted, no swelling in the shoulder itself and no pain with passive range of motion of the shoulder.  Neurologic:  Awake, fluent speech, normal comprehension, AAOx3  Skin:  Pink warm and dry  Psychiatric:  Alert, pleasant, cooperative      ED Course       Incision and drainage performed by ED resident Dr. Cnode, expressed pus, patient feeling better afterwards.    Patient appears to have mild abscess, no sign of significant surrounding erythema, sign of swelling of  the shoulder, no sign of septic arthritis.    Supportive care, importance of follow-up and return precautions were discussed with the patient, who expressed understanding.      DIAGNOSIS:  Acute left axillary cutaneous abscess    MEDICAL DECISION MAKING CODING      Chronic conditions affecting care: As per HPI    COLLECTION AND INTERPRETATION OF DATA  Additional history obtained from: Friend  I reviewed prior external notes, including November 2022 hospital admission for opiate overdose    RISK  Drugs (OTC, Rx, Controlled substances): Prescription management      Social Determinants of Health:  Presentation to ED outside of business hours or on night shift        Critical Care Time  Procedures

## 2024-06-01 ENCOUNTER — HOSPITAL ENCOUNTER (EMERGENCY)
Facility: HOSPITAL | Age: 39
Discharge: HOME | End: 2024-06-02
Attending: EMERGENCY MEDICINE
Payer: COMMERCIAL

## 2024-06-01 VITALS
SYSTOLIC BLOOD PRESSURE: 132 MMHG | DIASTOLIC BLOOD PRESSURE: 90 MMHG | RESPIRATION RATE: 15 BRPM | OXYGEN SATURATION: 97 % | HEART RATE: 100 BPM | TEMPERATURE: 98.4 F

## 2024-06-01 DIAGNOSIS — T50.901A ACCIDENTAL OVERDOSE, INITIAL ENCOUNTER: Primary | ICD-10-CM

## 2024-06-01 PROCEDURE — 99284 EMERGENCY DEPT VISIT MOD MDM: CPT | Performed by: EMERGENCY MEDICINE

## 2024-06-01 PROCEDURE — 99284 EMERGENCY DEPT VISIT MOD MDM: CPT

## 2024-06-02 PROCEDURE — 96361 HYDRATE IV INFUSION ADD-ON: CPT

## 2024-06-02 PROCEDURE — 96374 THER/PROPH/DIAG INJ IV PUSH: CPT

## 2024-06-02 PROCEDURE — 96375 TX/PRO/DX INJ NEW DRUG ADDON: CPT

## 2024-06-02 RX ORDER — NAPROXEN 500 MG/1
500 TABLET ORAL ONCE
Status: DISCONTINUED | OUTPATIENT
Start: 2024-06-02 | End: 2024-06-02

## 2024-06-02 RX ORDER — ONDANSETRON 2 MG/ML
4 INJECTION INTRAMUSCULAR; INTRAVENOUS ONCE
Status: COMPLETED | OUTPATIENT
Start: 2024-06-02 | End: 2024-06-02

## 2024-06-02 RX ORDER — LORAZEPAM 2 MG/ML
0.5 INJECTION INTRAMUSCULAR ONCE
Status: COMPLETED | OUTPATIENT
Start: 2024-06-02 | End: 2024-06-02

## 2024-06-02 RX ORDER — LORAZEPAM 0.5 MG/1
0.5 TABLET ORAL ONCE
Status: DISCONTINUED | OUTPATIENT
Start: 2024-06-02 | End: 2024-06-02

## 2024-06-02 RX ORDER — KETOROLAC TROMETHAMINE 30 MG/ML
15 INJECTION, SOLUTION INTRAMUSCULAR; INTRAVENOUS ONCE
Status: COMPLETED | OUTPATIENT
Start: 2024-06-02 | End: 2024-06-02

## 2024-06-02 RX ORDER — ACETAMINOPHEN 325 MG/1
975 TABLET ORAL ONCE
Status: DISCONTINUED | OUTPATIENT
Start: 2024-06-02 | End: 2024-06-02 | Stop reason: HOSPADM

## 2024-06-02 RX ORDER — GABAPENTIN 300 MG/1
300 CAPSULE ORAL EVERY 6 HOURS
Qty: 12 CAPSULE | Refills: 0 | Status: SHIPPED | OUTPATIENT
Start: 2024-06-02

## 2024-06-02 RX ORDER — CLONIDINE HYDROCHLORIDE 0.1 MG/1
0.1 TABLET ORAL ONCE
Status: COMPLETED | OUTPATIENT
Start: 2024-06-02 | End: 2024-06-02

## 2024-06-02 RX ORDER — CLONIDINE HYDROCHLORIDE 0.1 MG/1
0.1 TABLET ORAL EVERY 6 HOURS PRN
Qty: 12 TABLET | Refills: 0 | Status: SHIPPED | OUTPATIENT
Start: 2024-06-02

## 2024-06-02 RX ADMIN — ONDANSETRON 4 MG: 2 INJECTION INTRAMUSCULAR; INTRAVENOUS at 00:11

## 2024-06-02 RX ADMIN — SODIUM CHLORIDE 1000 ML: 0.9 INJECTION, SOLUTION INTRAVENOUS at 00:18

## 2024-06-02 RX ADMIN — KETOROLAC TROMETHAMINE 15 MG: 30 INJECTION, SOLUTION INTRAMUSCULAR; INTRAVENOUS at 00:11

## 2024-06-02 RX ADMIN — NALOXONE HYDROCHLORIDE 4 MG: 4 SPRAY NASAL at 03:20

## 2024-06-02 RX ADMIN — CLONIDINE HYDROCHLORIDE 0.1 MG: 0.1 TABLET ORAL at 00:15

## 2024-06-02 RX ADMIN — LORAZEPAM 0.5 MG: 2 INJECTION INTRAMUSCULAR; INTRAVENOUS at 00:11

## 2024-06-02 NOTE — ED PROVIDER NOTES
History  Chief Complaint   Patient presents with    Overdose - Accidental     Patient reports being sober for about 7 months and used a bag of heroine tonight via snorting. Mother found him unconscious and gave him intranasal narcan. EMS reported patient ran away from house and police found him. Patient brought in via EMS. No complaints except for thirst and headache.      38-year-old man with relevant past medical history of opioid use disorder presents after opioid overdose.  Patient reports being sober for 7 months.  He has been maintained on Suboxone outpatient.  He was unable to secure his refill and relapsed on fentanyl yesterday and today.  This evening, he insufflated fentanyl and was found unconscious by his mother.  He was given intranasal Narcan and EMS were called.  Patient denies any other coingestions.  He is reporting a headache and vomiting.  He reportedly takes 12 mg Suboxone daily outpatient.        Prior to Admission Medications   Prescriptions Last Dose Informant Patient Reported? Taking?   ondansetron (ZOFRAN-ODT) 4 mg disintegrating tablet   No No   Sig: Take 1 tablet (4 mg total) by mouth every 6 (six) hours as needed for nausea or vomiting for up to 6 doses      Facility-Administered Medications: None       Past Medical History:   Diagnosis Date    Anxiety     Drug abuse (HCC)     Leukocytosis 12/22/2018    Substance abuse (HCC)        History reviewed. No pertinent surgical history.    Family History   Problem Relation Age of Onset    Diabetes Brother     Heart disease Maternal Grandmother      I have reviewed and agree with the history as documented.    E-Cigarette/Vaping    E-Cigarette Use Never User      E-Cigarette/Vaping Substances     Social History     Tobacco Use    Smoking status: Every Day     Current packs/day: 0.25     Types: Cigarettes    Smokeless tobacco: Never    Tobacco comments:     occationally   Vaping Use    Vaping status: Never Used   Substance Use Topics    Alcohol  use: Not Currently     Comment: social    Drug use: Yes     Types: Methamphetamines, Fentanyl     Comment: reports snorting 6 bags fentanyl daily        Review of Systems    Physical Exam  ED Triage Vitals [06/01/24 2350]   Temperature Pulse Respirations Blood Pressure SpO2   98.4 °F (36.9 °C) 100 15 132/90 97 %      Temp Source Heart Rate Source Patient Position - Orthostatic VS BP Location FiO2 (%)   Oral -- -- -- --      Pain Score       6             Orthostatic Vital Signs  Vitals:    06/01/24 2350   BP: 132/90   Pulse: 100       Physical Exam  Vitals and nursing note reviewed.   Constitutional:       Appearance: Normal appearance.   HENT:      Head: Normocephalic and atraumatic.      Right Ear: External ear normal.      Left Ear: External ear normal.   Cardiovascular:      Rate and Rhythm: Tachycardia present.   Pulmonary:      Effort: Pulmonary effort is normal. No respiratory distress.      Breath sounds: Normal breath sounds.   Abdominal:      Palpations: Abdomen is soft.      Tenderness: There is no abdominal tenderness. There is no guarding or rebound.   Musculoskeletal:         General: Normal range of motion.      Cervical back: Normal range of motion and neck supple.   Skin:     General: Skin is warm and dry.   Neurological:      Mental Status: He is alert and oriented to person, place, and time. Mental status is at baseline.   Psychiatric:         Mood and Affect: Mood normal.         Behavior: Behavior normal.         ED Medications  Medications   cloNIDine (CATAPRES) tablet 0.1 mg (0.1 mg Oral Given 6/2/24 0015)   ondansetron (ZOFRAN) injection 4 mg (4 mg Intravenous Given 6/2/24 0011)   ketorolac (TORADOL) injection 15 mg (15 mg Intravenous Given 6/2/24 0011)   LORazepam (ATIVAN) injection 0.5 mg (0.5 mg Intravenous Given 6/2/24 0011)   sodium chloride 0.9 % bolus 1,000 mL (0 mL Intravenous Stopped 6/2/24 0218)   naloxone nasal- Given to patient by provider at discharge. (NARCAN) 4 mg/0.1 mL nasal  "spray 4 mg (4 mg Does not apply Given by Other 6/2/24 0320)       Diagnostic Studies  Results Reviewed       None                   No orders to display         Procedures  Procedures      ED Course           Medical Decision Making  Presents after accidental overdose.  Patient was given naloxone in the field with improvement of his symptoms.  Upon arrival here, he is in mild withdrawal and was treated symptomatically.  We discussed discharge home versus admission for opioid withdrawal.  He does not believe he will go into significant withdrawal and can manage his withdrawal at home as he only started using 2 days ago again.  He will follow-up with his outpatient provider for Suboxone.  Patient was given prescriptions for gabapentin and clonidine to help with his withdrawal symptoms.  I provided him with a community dispense naloxone.  Patient in agreement with the medical plan and all questions were answered.  The patient verbalized understanding of my return precautions.    Previous ED, hospitalization, and outpatient documentation was reviewed.  History was obtained from the patient.    Portions or all of this note were generated using voice recognition software.  Occasional wrong word or \"sound a like\" substitutions may have occurred due to the inherent limitations of voice recognition software.  Please interpret any errors within the intended context of the whole sentence or idea.      Risk  OTC drugs.  Prescription drug management.          Disposition  Final diagnoses:   Accidental overdose, initial encounter     Time reflects when diagnosis was documented in both MDM as applicable and the Disposition within this note       Time User Action Codes Description Comment    6/2/2024  1:51 AM Fermín Valdivia [T50.901A] Accidental overdose, initial encounter           ED Disposition       ED Disposition   Discharge    Condition   Stable    Date/Time   Sun Jun 2, 2024  3:14 AM    Comment   Pravin Oliveros " discharge to home/self care.                   Follow-up Information       Follow up With Specialties Details Why Contact Info Additional Information    [facility] Emergency Department Emergency Medicine Go to  If symptoms worsen [address] [facility] [id number] [phone] [facility] Emergency Department, [address], [place], [id number] [phone]            Discharge Medication List as of 6/2/2024  3:17 AM        START taking these medications    Details   cloNIDine (Catapres) 0.1 mg tablet Take 1 tablet (0.1 mg total) by mouth every 6 (six) hours as needed for high blood pressure, Starting Sun 6/2/2024, Normal      gabapentin (Neurontin) 300 mg capsule Take 1 capsule (300 mg total) by mouth every 6 (six) hours For post-herpetic neuralgia: Take 1 tablet on day 1,  Then take 2 tablets on day 2, Then take 3 tablets on day 3 and every day after that as instructed by your doctor., Starting Sun 6/2/2024, Nor mal           CONTINUE these medications which have NOT CHANGED    Details   ondansetron (ZOFRAN-ODT) 4 mg disintegrating tablet Take 1 tablet (4 mg total) by mouth every 6 (six) hours as needed for nausea or vomiting for up to 6 doses, Starting Tue 4/2/2024, Normal           No discharge procedures on file.    PDMP Review         Value Time User    PDMP Reviewed  Yes 7/20/2023  8:40 PM Patricia Beard PA-C             ED Provider  Attending physically available and evaluated Pravin Oliveros. I managed the patient along with the ED Attending.    Electronically Signed by           Fermín Valdivia MD  06/02/24 2797

## 2024-06-02 NOTE — DISCHARGE INSTRUCTIONS
You were seen for an accidental overdose. You wanted to try to manage your withdrawal at home. Please talk to your suboxone prescriber regarding your medication issues. Please return to the ER if you are having issues managing your withdrawal.

## 2024-06-02 NOTE — ED ATTENDING ATTESTATION
6/1/2024  I, Noel Morocho DO, saw and evaluated the patient. I have discussed the patient with the resident/non-physician practitioner and agree with the resident's/non-physician practitioner's findings, Plan of Care, and MDM as documented in the resident's/non-physician practitioner's note, except where noted. All available labs and Radiology studies were reviewed.  I was present for key portions of any procedure(s) performed by the resident/non-physician practitioner and I was immediately available to provide assistance.       At this point I agree with the current assessment done in the Emergency Department.  I have conducted an independent evaluation of this patient a history and physical is as follows:    Patient is a 38-year-old male with a history of heroin abuse, says that he had been sober for about 7 months from heroin, is on Suboxone, normally takes it daily but stopped using Suboxone 3 days ago.  Tonight he snorted fentanyl, became lightheaded and passed out.  His mother found him, gave him intranasal Narcan, and EMS was activated.  EMS indicated that the patient ran away from the house, police found him and he was brought in by EMS.  Patient says that he was starting to withdrawal after giving intranasal Narcan, right now does feel somewhat nauseous, feels somewhat body aches and soreness and feels like he is going through withdrawal.  He has no thoughts of harming himself.  No falls or trauma.    General:  Patient is well-appearing  Head:  Atraumatic  Eyes:  Conjunctiva pink  ENT:  Mucous membranes are moist  Neck:  Supple  Cardiac:  S1-S2, without murmurs  Lungs:  Clear to auscultation bilaterally  Abdomen:  Soft, nontender, normal bowel sounds, no CVA tenderness, no tympany, no rigidity, no guarding  Extremities:  Normal range of motion  Neurologic:  Awake, fluent speech, normal comprehension. AAOx3.   Skin:  Pink warm and dry  Psychiatric:  Alert, pleasant, cooperative    ED Course     Patient  presents with mild withdrawal from the Narcan, no suicidal or homicidal thoughts.  Plan is for symptomatic management for his withdrawal, and then observation to ensure there is no resedation once the Narcan wears off. Signed out to Dr. Pacheco    Critical Care Time  Procedures

## 2024-07-03 ENCOUNTER — HOSPITAL ENCOUNTER (INPATIENT)
Facility: HOSPITAL | Age: 39
LOS: 1 days | Discharge: HOME | DRG: 773 | End: 2024-07-04
Attending: EMERGENCY MEDICINE | Admitting: EMERGENCY MEDICINE
Payer: COMMERCIAL

## 2024-07-03 DIAGNOSIS — F11.20 OPIOID USE DISORDER, SEVERE, DEPENDENCE (HCC): ICD-10-CM

## 2024-07-03 DIAGNOSIS — F19.10 POLYSUBSTANCE ABUSE (HCC): Primary | ICD-10-CM

## 2024-07-03 PROBLEM — D72.829 LEUKOCYTOSIS: Status: ACTIVE | Noted: 2024-07-03

## 2024-07-03 PROBLEM — F13.939 BENZODIAZEPINE WITHDRAWAL WITH COMPLICATION (HCC): Status: ACTIVE | Noted: 2024-07-03

## 2024-07-03 PROBLEM — F13.20 MODERATE BENZODIAZEPINE USE DISORDER (HCC): Status: ACTIVE | Noted: 2018-03-12

## 2024-07-03 PROBLEM — R00.1 SINUS BRADYCARDIA: Status: ACTIVE | Noted: 2024-07-03

## 2024-07-03 LAB
ALBUMIN SERPL BCG-MCNC: 4.3 G/DL (ref 3.5–5)
ALP SERPL-CCNC: 72 U/L (ref 34–104)
ALT SERPL W P-5'-P-CCNC: 17 U/L (ref 7–52)
AMPHETAMINES SERPL QL SCN: POSITIVE
ANION GAP SERPL CALCULATED.3IONS-SCNC: 5 MMOL/L (ref 4–13)
AST SERPL W P-5'-P-CCNC: 18 U/L (ref 13–39)
ATRIAL RATE: 39 BPM
ATRIAL RATE: 46 BPM
BARBITURATES UR QL: NEGATIVE
BASOPHILS # BLD AUTO: 0.05 THOUSANDS/ÂΜL (ref 0–0.1)
BASOPHILS NFR BLD AUTO: 0 % (ref 0–1)
BENZODIAZ UR QL: NEGATIVE
BILIRUB SERPL-MCNC: 0.61 MG/DL (ref 0.2–1)
BUN SERPL-MCNC: 16 MG/DL (ref 5–25)
CALCIUM SERPL-MCNC: 9.9 MG/DL (ref 8.4–10.2)
CHLORIDE SERPL-SCNC: 99 MMOL/L (ref 96–108)
CO2 SERPL-SCNC: 34 MMOL/L (ref 21–32)
COCAINE UR QL: NEGATIVE
CREAT SERPL-MCNC: 1.1 MG/DL (ref 0.6–1.3)
EOSINOPHIL # BLD AUTO: 0.25 THOUSAND/ÂΜL (ref 0–0.61)
EOSINOPHIL NFR BLD AUTO: 2 % (ref 0–6)
ERYTHROCYTE [DISTWIDTH] IN BLOOD BY AUTOMATED COUNT: 11.8 % (ref 11.6–15.1)
ETHANOL EXG-MCNC: 0 MG/DL
FENTANYL UR QL SCN: POSITIVE
GFR SERPL CREATININE-BSD FRML MDRD: 85 ML/MIN/1.73SQ M
GLUCOSE SERPL-MCNC: 114 MG/DL (ref 65–140)
HCT VFR BLD AUTO: 37.4 % (ref 36.5–49.3)
HGB BLD-MCNC: 12.3 G/DL (ref 12–17)
HYDROCODONE UR QL SCN: NEGATIVE
IMM GRANULOCYTES # BLD AUTO: 0.08 THOUSAND/UL (ref 0–0.2)
IMM GRANULOCYTES NFR BLD AUTO: 1 % (ref 0–2)
LYMPHOCYTES # BLD AUTO: 1.45 THOUSANDS/ÂΜL (ref 0.6–4.47)
LYMPHOCYTES NFR BLD AUTO: 12 % (ref 14–44)
MAGNESIUM SERPL-MCNC: 2.2 MG/DL (ref 1.9–2.7)
MCH RBC QN AUTO: 31.2 PG (ref 26.8–34.3)
MCHC RBC AUTO-ENTMCNC: 32.9 G/DL (ref 31.4–37.4)
MCV RBC AUTO: 95 FL (ref 82–98)
METHADONE UR QL: NEGATIVE
MONOCYTES # BLD AUTO: 0.55 THOUSAND/ÂΜL (ref 0.17–1.22)
MONOCYTES NFR BLD AUTO: 5 % (ref 4–12)
NEUTROPHILS # BLD AUTO: 9.95 THOUSANDS/ÂΜL (ref 1.85–7.62)
NEUTS SEG NFR BLD AUTO: 80 % (ref 43–75)
NRBC BLD AUTO-RTO: 0 /100 WBCS
OPIATES UR QL SCN: NEGATIVE
OXYCODONE+OXYMORPHONE UR QL SCN: NEGATIVE
P AXIS: 65 DEGREES
P AXIS: 68 DEGREES
PCP UR QL: NEGATIVE
PLATELET # BLD AUTO: 347 THOUSANDS/UL (ref 149–390)
PMV BLD AUTO: 9.1 FL (ref 8.9–12.7)
POTASSIUM SERPL-SCNC: 3.9 MMOL/L (ref 3.5–5.3)
PR INTERVAL: 146 MS
PR INTERVAL: 150 MS
PROT SERPL-MCNC: 7 G/DL (ref 6.4–8.4)
QRS AXIS: 43 DEGREES
QRS AXIS: 60 DEGREES
QRSD INTERVAL: 88 MS
QRSD INTERVAL: 92 MS
QT INTERVAL: 480 MS
QT INTERVAL: 504 MS
QTC INTERVAL: 405 MS
QTC INTERVAL: 420 MS
RBC # BLD AUTO: 3.94 MILLION/UL (ref 3.88–5.62)
SODIUM SERPL-SCNC: 138 MMOL/L (ref 135–147)
T WAVE AXIS: 27 DEGREES
T WAVE AXIS: 7 DEGREES
THC UR QL: NEGATIVE
VENTRICULAR RATE: 39 BPM
VENTRICULAR RATE: 46 BPM
WBC # BLD AUTO: 12.33 THOUSAND/UL (ref 4.31–10.16)

## 2024-07-03 PROCEDURE — 85025 COMPLETE CBC W/AUTO DIFF WBC: CPT | Performed by: PHYSICIAN ASSISTANT

## 2024-07-03 PROCEDURE — 99284 EMERGENCY DEPT VISIT MOD MDM: CPT

## 2024-07-03 PROCEDURE — 80053 COMPREHEN METABOLIC PANEL: CPT | Performed by: PHYSICIAN ASSISTANT

## 2024-07-03 PROCEDURE — 93010 ELECTROCARDIOGRAM REPORT: CPT | Performed by: INTERNAL MEDICINE

## 2024-07-03 PROCEDURE — NC001 PR NO CHARGE

## 2024-07-03 PROCEDURE — 93005 ELECTROCARDIOGRAM TRACING: CPT

## 2024-07-03 PROCEDURE — 82075 ASSAY OF BREATH ETHANOL: CPT | Performed by: PHYSICIAN ASSISTANT

## 2024-07-03 PROCEDURE — 83735 ASSAY OF MAGNESIUM: CPT | Performed by: PHYSICIAN ASSISTANT

## 2024-07-03 PROCEDURE — 80307 DRUG TEST PRSMV CHEM ANLYZR: CPT | Performed by: PHYSICIAN ASSISTANT

## 2024-07-03 PROCEDURE — 99222 1ST HOSP IP/OBS MODERATE 55: CPT

## 2024-07-03 PROCEDURE — 99285 EMERGENCY DEPT VISIT HI MDM: CPT | Performed by: PHYSICIAN ASSISTANT

## 2024-07-03 PROCEDURE — 36415 COLL VENOUS BLD VENIPUNCTURE: CPT | Performed by: PHYSICIAN ASSISTANT

## 2024-07-03 RX ORDER — TRAZODONE HYDROCHLORIDE 50 MG/1
50 TABLET ORAL
Status: DISCONTINUED | OUTPATIENT
Start: 2024-07-03 | End: 2024-07-04 | Stop reason: HOSPADM

## 2024-07-03 RX ORDER — ACETAMINOPHEN 325 MG/1
650 TABLET ORAL EVERY 6 HOURS PRN
Status: DISCONTINUED | OUTPATIENT
Start: 2024-07-03 | End: 2024-07-04 | Stop reason: HOSPADM

## 2024-07-03 RX ORDER — SODIUM CHLORIDE 9 MG/ML
125 INJECTION, SOLUTION INTRAVENOUS CONTINUOUS
Status: DISCONTINUED | OUTPATIENT
Start: 2024-07-03 | End: 2024-07-04 | Stop reason: HOSPADM

## 2024-07-03 RX ORDER — ONDANSETRON 2 MG/ML
4 INJECTION INTRAMUSCULAR; INTRAVENOUS EVERY 6 HOURS PRN
Status: DISCONTINUED | OUTPATIENT
Start: 2024-07-03 | End: 2024-07-04 | Stop reason: HOSPADM

## 2024-07-03 RX ORDER — GABAPENTIN 300 MG/1
300 CAPSULE ORAL EVERY 8 HOURS PRN
Status: DISCONTINUED | OUTPATIENT
Start: 2024-07-03 | End: 2024-07-04 | Stop reason: HOSPADM

## 2024-07-03 RX ORDER — CLONIDINE HYDROCHLORIDE 0.1 MG/1
0.1 TABLET ORAL EVERY 6 HOURS PRN
Status: DISCONTINUED | OUTPATIENT
Start: 2024-07-03 | End: 2024-07-03

## 2024-07-03 RX ORDER — BUPRENORPHINE AND NALOXONE 2; .5 MG/1; MG/1
12 FILM, SOLUBLE BUCCAL; SUBLINGUAL DAILY
COMMUNITY
End: 2024-07-04

## 2024-07-03 RX ORDER — ENOXAPARIN SODIUM 100 MG/ML
40 INJECTION SUBCUTANEOUS DAILY
Status: DISCONTINUED | OUTPATIENT
Start: 2024-07-04 | End: 2024-07-04 | Stop reason: HOSPADM

## 2024-07-03 RX ORDER — LANOLIN ALCOHOL/MO/W.PET/CERES
6 CREAM (GRAM) TOPICAL
Status: DISCONTINUED | OUTPATIENT
Start: 2024-07-03 | End: 2024-07-04 | Stop reason: HOSPADM

## 2024-07-03 RX ORDER — BUPRENORPHINE 20 UG/H
20 PATCH TRANSDERMAL
Status: DISCONTINUED | OUTPATIENT
Start: 2024-07-03 | End: 2024-07-04 | Stop reason: HOSPADM

## 2024-07-03 RX ADMIN — SODIUM CHLORIDE 1000 ML: 0.9 INJECTION, SOLUTION INTRAVENOUS at 09:41

## 2024-07-03 RX ADMIN — GABAPENTIN 300 MG: 300 CAPSULE ORAL at 19:58

## 2024-07-03 RX ADMIN — SODIUM CHLORIDE 125 ML/HR: 0.9 INJECTION, SOLUTION INTRAVENOUS at 20:01

## 2024-07-03 RX ADMIN — BUPRENORPHINE 20 MCG: 20 PATCH, EXTENDED RELEASE TRANSDERMAL at 09:23

## 2024-07-03 RX ADMIN — SODIUM CHLORIDE 125 ML/HR: 0.9 INJECTION, SOLUTION INTRAVENOUS at 12:18

## 2024-07-03 NOTE — H&P
HISTORY & PHYSICAL EXAM  DEPARTMENT OF MEDICAL TOXICOLOGY  LEVEL 4 MEDICAL DETOX UNIT  Pravin Oliveros 38 y.o. male MRN: [id number]  Unit/Bed#: [address] [place] [address] [id number] Encounter: [id number]      Reason for Admission/Principal Problem: Opioid withdrawal, opioid use disorder  Admitting Provider: Patricia Tanner PA-C  Attending Provider: Waylon Forrest DO   7/3/2024  6:20 AM      * Benzodiazepine withdrawal with complication (HCC)  Assessment & Plan  Patient with a history of chronic benzodiazepine use   Last use  Ativan 2-3 mg daily, last use 2 days ago  Initiate SEWS protocol for medical management of benzodiazepine withdrawal  Current benzo withdrawal signs/symptoms include none   Continue monitoring under protocol and administer phenobarbital as indicated  Continuous pulse ox and telemetry monitoring  Encourage continued cessation of benzodiazepine use after withdrawal is fully treated     Polysubstance abuse (HCC)  Assessment & Plan  Admits to using ativan, fentanyl, and meth.   Last use of ativan 2 days prior to admission   Last use of fentanyl 7/3 12am   Last use of meth 1 week prior to admission     Sinus bradycardia  Assessment & Plan  Sinus Bradycardia in the setting of recent opioid use   HR 40 bpm   Telemetry monitoring     Leukocytosis  Assessment & Plan  Lab Results   Component Value Date    WBC 12.33 (H) 07/03/2024        Elevated WBC on admission  Possibly 2/2 leukemoid reaction from acute withdrawal  No evidence of active infection   Pt afebrile, VSS  Continue monitoring CBC, VS     Opioid withdrawal (HCC)  Assessment & Plan  Patient with a history of chronic opioid use  Last use was 7/3/24 12 am via insufflation   Initiate MAT/opioid withdrawal protocol with plan for eventual microinduction with Suboxone  Currently experiencing not experiencing any  opioid withdrawal s/sx  Initiate Butrans 20 mcg/hr patch   Continue monitoring opioid withdrawal consider microinduction when >24 hours have passed  since pt's last opioid use,depending on severity of pt's w/d symptoms  Symptomatic and supportive care  Continuous pulse oximetry monitoring     Opioid use disorder, severe, dependence (HCC)  Assessment & Plan  Patient with a history of chronic fentanyl use  Uses 10 bags daily via insufflation   Denies h/o IVDU  History of prior of MAT- was on suboxone 12 mg daily through cleanslate- per PDMP last filled 6/5/24   Management of opioid withdrawal under MAT protocol as above  Case management/CRS consulted for assistance with aftercare resources - pt expresses interest in outpatient MAT after d/c     Anxiety  Assessment & Plan  Patient endorses a h/o chronic anxiety and depression  Denies SI/HI/thoughts of self harm  Continue home medications  Recommend OP f/u with PCP/OP Psychiatry     Moderate benzodiazepine use disorder (HCC)  Assessment & Plan  Patient with a h/o chronic benzodiazepine use  Using Ativan 2-3 mg daily   Denies H/o benzo withdrawal seizures  Withdrawal management under SEWS protocol as above  Recommend f/u with OP Psychiatry for continued psychiatric care  Consult case management/CRS for assistance with aftercare resources   Encourage cessation of benzodiazepine use             Additional medical treatment(s) includes multivitamin, butrans patch        VTE Prophylaxis: Enoxaparin (Lovenox)  / sequential compression device   Code Status: Full code      Anticipated Length of Stay:  Patient will be admitted on an Inpatient basis with an anticipated length of stay of  2  midnights.   Justification for Hospital Stay: opioid, benzo withdrawal     For any questions or concerns, please Tiger Text the advanced practitioner in the role of [facility] On Call      This patient qualifies for Level IV medically managed intensive inpatient services under the criteria set by the American Society of Addiction Medicine, including dimensions 1-3. The patient is in withdrawal (or is intoxicated with high risk of  withdrawal), with severe and unstable medical and/or psychiatric (dual diagnosis) problems, requiring requires 24-hour medical and nursing care and the full resources of a licensed hospital.          MEDICATION ASSISTED TREATMENT PATHWAY    Admit patient to medical detox unit and continue supportive care and stabilization of acute opioid withdrawal per medical toxicology/detox medication assisted treatment pathway.     Complicated Opioid Withdrawal (ie associated with long acting agents, such as methadone or illicit fentanyl analogues):  >72 hours: Routine COWS/induction as per uncomplicated opoid withdrawal (below)  <72 hours:  Adjunctive medications (see below), including clonazepam 1-2mg Q6 hrs PRN anxiety (or diazepam 5 mg if cannot take PO)  >48 hours, test dose buprenorphine 0.5-1mg.   If responds well, continue with 2mg Q2 hrs (total 8mg) holding for worsening withdrawal, then start maintenance dosing   If responds poorly, follow next step below   <48 hours and/or COWS < 6 or failed test dose, add Butrans transdermal patch 20-40mcg/hr, monitor for signs/symptoms of withdrawal   If within first 24-48 hrs, can administer buprenorphine 0.5-1mg Q6 hrs x 4, followed by 2mg Q2 hrs x 4 the next day  If surpassing 48 hrs, can administer buprenorphine 2mg Q2hrs if tolerated without transdermal patch or lower sublingual dose.   When complete, remove transdermal patch and start maintenance dosing   For moderate-severe withdrawal (COWS >/= 8, may still consider routine induction with buprenorphine 8 mg if appropriate.   For worsened or precipitated withdrawal, consider adjunctive benzodiazepines and other comfort meds. Dexmedetomidine may be considered for severe precipitated withdrawal.         Uncomplicated Opioid Withdrawal:  Monitor opioid severity via Clinical Opioid Withdrawal Scale (COWS) Q4 hours and administer buprenorphine/naloxone 8mg/2mg when COWS >8, or when greater than 24 hours have elapsed from most  recent opioid use (excluding long-acting opioids, such as methadone). Continue to monitor opioid severity Q30-60 minutes after first dose and administer additional buprenorphine 2-4mg every 30-60 minutes until COWS < 8 for two consecutive hours.              Adjunctive medications administered as needed:  Clonidine 0.1 mg PO Q6 hours PRN anxiety or palpitations    Gabapentin 300mg PO Q8 hours PRN anxiety    Ibuprofen 600 mg PO Q6 hours PRN pain    Acetaminophen 1000mg PO Q8 hours PRN pain    Ondansetron 4 mg PO Q6 hours PRN N/V    Nicotine patch 7, 14, 21 mg  PRN nicotine withdrawal   Trazodone 50 mg PO QHS PRN sleep    Loperamide 4 mg PO PRN diarrhea up to 16 mg/day       The risks, benefits and mechanism of buprenorphine/naloxone were discussed and patient agreed to treatment. Case management consultation will take place to assist with coordination of subsequent treatment after discharge.     Administer daily multivitamin. Evaluate and treat for coexisting substance use, such as nicotine. Discuss risk factors for infectious disease, such as history of intravenous drug abuse, and offer hepatitis and HIV screening if indicated.           Hx and PE limited by: somnolence     HPI: Pravin Oliveros is a 38 y.o. year old male who presents with benzodiazepine and opioid withdrawal seeking detoxification. Patient initially presented to the [facility] ED requesting opioid/benzo detox unit for medically assisted withdrawal management and initiation of buprenorphine MAT. Pt reports Ativan 3mg qhs. His last benzo use was 2 days ago. Patient without history of benzo withdrawal seizures. Patient also reports using 1 bundle of fentanyl daily via insufflation and last amphetamine (meth) use 1 week ago. Last opioid use was 7/3 at 12am. No history of IVDU. Patient agreeable to initiation of Butrans 20 mcg/hr patch, monitoring/treatment under [facility] protocol for benzo withdrawal, and supportive care for opioid withdrawal sx at this time  with plan for microinduction with Suboxone when at least 24 hours have passed since last opioid use. Patient's past treatment hx consists of Suboxone.      Opioids currently used: fentanyl  Route of use: insufflation  Date/Time of Last Opioid Use: 7/3 12:00 am  Current Signs/Symptoms of Opioid Withdrawal: None     COWS score:   Clinical Opiate Withdrawal Scale  Pulse: (!) 40  Resting Pulse Rate: Measured After Patient is Sitting or Lying for One Minute: Pulse rate 80 or below  GI Upset: Over Last Half Hour: No GI symptoms  Sweating: Over Past Half Hour Not Accounted for by Room Temperature of Patient Activity: No report of chills or flushing  Tremor: Observation of Outstretched Hands: No tremor  Restlessness: Observation During Assessment: Able to sit still  Yawning: Observation During Assessment: No yawning  Pupil Size: Pupils pinned or normal size for room light  Anxiety and Irritability: None  Bone or Joint Aches: If Patient was Having Pain Previously, Only the Additional Component Attributed to Opiate Withdrawal is Scored: Not present  Gooseflesh Skin: Skin is smooth  Runny Nose or Tearing: Not Accounted for by Cold Symptoms or Allergies: Not present  Clinical Opiate Withdrawal Scale Total Score: 0      Methadone & Buprenorphine History  History of prior treatment for opioid dependence? yes  Currently on Methadone Maintenance? no  History of prior treatment with Suboxone? yes  Currently taking Suboxone? no  History of using Suboxone without having a prescription? no  History of IVDA? no  Co-existing substance use? yes    Review of PDMP: yes    Social History     Substance and Sexual Activity   Alcohol Use Not Currently    Comment: social     Social History     Substance and Sexual Activity   Drug Use Yes    Types: Methamphetamines, Fentanyl    Comment: reports snorting 6 bags fentanyl daily     Social History     Tobacco Use   Smoking Status Every Day    Current packs/day: 0.25    Types: Cigarettes   Smokeless  Tobacco Never   Tobacco Comments    occationally       Review of Systems    Historical Information   Past Medical History:   Diagnosis Date    Anxiety     Drug abuse (HCC)     Leukocytosis 12/22/2018    Substance abuse (HCC)      History reviewed. No pertinent surgical history.  Family History   Problem Relation Age of Onset    Diabetes Brother     Heart disease Maternal Grandmother      Social History   Marital Status: Single   Patient Pre-hospital Living Situation: Apartment  Patient Pre-hospital Level of Mobility: Walks  Patient Pre-hospital Diet Restrictions: None     Allergies   Allergen Reactions    Penicillins        Prior to Admission medications    Medication Sig Start Date End Date Taking? Authorizing Provider   cloNIDine (Catapres) 0.1 mg tablet Take 1 tablet (0.1 mg total) by mouth every 6 (six) hours as needed for high blood pressure 6/2/24   Fermín Valdivia MD   gabapentin (Neurontin) 300 mg capsule Take 1 capsule (300 mg total) by mouth every 6 (six) hours For post-herpetic neuralgia: Take 1 tablet on day 1,  Then take 2 tablets on day 2, Then take 3 tablets on day 3 and every day after that as instructed by your doctor. 6/2/24   Fermín Valdivia MD   ondansetron (ZOFRAN-ODT) 4 mg disintegrating tablet Take 1 tablet (4 mg total) by mouth every 6 (six) hours as needed for nausea or vomiting for up to 6 doses 4/2/24   Geovanny Conde, DO       Current Facility-Administered Medications   Medication Dose Route Frequency    acetaminophen (TYLENOL) tablet 650 mg  650 mg Oral Q6H PRN    acetaminophen (TYLENOL) tablet 650 mg  650 mg Oral Q6H PRN    [START ON 7/4/2024] enoxaparin (LOVENOX) subcutaneous injection 40 mg  40 mg Subcutaneous Daily    gabapentin (NEURONTIN) capsule 300 mg  300 mg Oral Q8H PRN    multivitamin-minerals (CENTRUM) tablet 1 tablet  1 tablet Oral Daily    naloxone (NARCAN) 0.04 mg/mL syringe 0.04 mg  0.04 mg Intravenous Q1MIN PRN    ondansetron (ZOFRAN) injection 4 mg  4 mg  "Intravenous Q6H PRN    transdermal buprenorphine (BUTRANS) 20 mcg/hr TD patch 20 mcg  20 mcg Transdermal Q7 Days    traZODone (DESYREL) tablet 50 mg  50 mg Oral HS PRN       Continuous Infusions:          Objective     No intake or output data in the 24 hours ending 07/03/24 1129    Invasive Devices:   Peripheral IV 07/03/24 Right Antecubital (Active)   Site Assessment Intact;Dry 07/03/24 0800   Dressing Type Transparent 07/03/24 0800   Line Status Blood return noted 07/03/24 0800   Dressing Status Clean;Dry;Intact 07/03/24 0800       Vitals   Vitals:    07/03/24 0700 07/03/24 0850 07/03/24 0947 07/03/24 1026   BP: 97/55 (!) 93/49 94/57 97/54   TempSrc:  Temporal Temporal    Pulse: 57 (!) 45 (!) 40    Resp:  15 15    Patient Position - Orthostatic VS:  Lying Lying Lying   Temp:  97.5 °F (36.4 °C) 97.5 °F (36.4 °C)        Physical Exam  HENT:      Mouth/Throat:      Mouth: Mucous membranes are dry.   Cardiovascular:      Rate and Rhythm: Bradycardia present.   Pulmonary:      Breath sounds: Normal breath sounds.   Abdominal:      Palpations: Abdomen is soft.   Musculoskeletal:      Right lower leg: No edema.      Left lower leg: No edema.   Neurological:      Mental Status: He is oriented to person, place, and time.      Comments: Somnolent          DATA    EKG, Pathology, and Other Studies: I have personally reviewed pertinent reports.    EKG: sinus bradycardia    Lab Results: I have personally reviewed pertinent reports.        CBC ETOH     Lab Results   Component Value Date    WBC 12.33 (H) 07/03/2024    RBC 3.94 07/03/2024    HGB 12.3 07/03/2024    HCT 37.4 07/03/2024    MCV 95 07/03/2024    MCH 31.2 07/03/2024    MCHC 32.9 07/03/2024    RDW 11.8 07/03/2024     07/03/2024    MPV 9.1 07/03/2024      No results found for: \"LACTICACID\"   CMP UA         Component Value Date/Time    K 3.9 07/03/2024 0655    K 4.2 06/28/2020 1118    CL 99 07/03/2024 0655     06/28/2020 1118    CO2 34 (H) 07/03/2024 0655    " "CO2 30 06/28/2020 1118    BUN 16 07/03/2024 0655    BUN 11 06/28/2020 1118    CREATININE 1.10 07/03/2024 0655    CREATININE 0.89 06/28/2020 1118         Component Value Date/Time    CALCIUM 9.9 07/03/2024 0655    CALCIUM 9.0 06/28/2020 1118    ALKPHOS 72 07/03/2024 0655    ALKPHOS 85 06/27/2020 1858    AST 18 07/03/2024 0655    AST 36 06/27/2020 1858    ALT 17 07/03/2024 0655    ALT 28 06/27/2020 1858      Lab Results   Component Value Date    CLARITYU Slightly Cloudy (A) 11/23/2022    COLORU Yellow 11/23/2022    SPECGRAV 1.025 11/23/2022    PHUR 5.0 11/23/2022    GLUCOSEU Negative 11/23/2022    KETONESU 5 (Trace) (A) 11/23/2022    BLOODU 10.0 (A) 11/23/2022    PROTEIN UA 30 (1+) (A) 11/23/2022    NITRITE Negative 11/23/2022    BILIRUBINUR Negative 11/23/2022    UROBILINOGEN 1.0 11/23/2022    LEUKOCYTESUR 25.0 (A) 11/23/2022    WBCUA 2-4 11/23/2022    RBCUA None Seen 11/23/2022    BACTERIA Occasional 11/23/2022    EPIS Occasional 11/23/2022        Liver Function Test: ASA     Lab Results   Component Value Date    TBILI 0.61 07/03/2024    TBILI 0.8 06/27/2020    ALKPHOS 72 07/03/2024    ALKPHOS 85 06/27/2020    AST 18 07/03/2024    AST 36 06/27/2020    ALT 17 07/03/2024    ALT 28 06/27/2020    TP 7.0 07/03/2024    TP 7.8 06/27/2020    ALB 4.3 07/03/2024    ALB 4.0 06/27/2020      Lab Results   Component Value Date    SALICYLATE <3 (L) 12/22/2018      Troponin APAP     Lab Results   Component Value Date    TROPONINI <0.02 06/26/2020      Lab Results   Component Value Date    ACTMNPHEN <2 (L) 12/22/2018      VBG HCG     No results found for: \"PHVEN\", \"NHA0FSV\", \"PO2VEN\", \"CTW4GEX\", \"BEVEN\", \"U3EPMUEKA\", \"N4VMFAR\"   No results found for: \"HCGQUANT\"   ABG Urine Drug Screen     No results found for: \"PHART\", \"QBV6DNR\", \"PO2ART\", \"OLV1ILQ\", \"BEART\", \"Q4EIKIZQD\", \"O2HGB\", \"SOURC\", \"LIBRADO\", \"VTAC\", \"ACRATE\", \"INSPIREDAIR\", \"PEEP\"   Lab Results   Component Value Date    AMPMETHUR Positive (A) 11/23/2022    BARBTUR Negative " "11/23/2022    BDZUR Negative 11/23/2022    COCAINEUR Positive (A) 11/23/2022    METHADONEUR Negative 11/23/2022    OPIATEUR Negative 11/23/2022    PCPUR Negative 11/23/2022    THCUR Negative 11/23/2022    OXYCODONEUR Negative 11/23/2022      Lactate INR     No results found for: \"LACTICACID\"   Lab Results   Component Value Date    INR 1.2 06/26/2020      PTT Protime     No results found for: \"PTT\"   No results found for: \"PROTIME\"   Hepatitis HIV     Lab Results   Component Value Date    HEPBSAG Non-reactive 11/24/2022    HEPCAB Non-reactive 11/24/2022      Lab Results   Component Value Date    [id number] Non-Reactive 11/24/2022    [id number] Non-Reactive 11/24/2022            Imaging Studies: I have personally reviewed pertinent reports.          Counseling / Coordination of Care  Total floor / unit time spent today 30 minutes. Greater than 50% of total time was spent with the patient and / or family counseling and / or coordination of care.       ** Please Note: This note has been constructed using a voice recognition system. **   "

## 2024-07-03 NOTE — PROCEDURES
EKG reviewed by me.  Normal sinus rhythm, bradycardia at a rate of 39.   Normal Axis.  , . No block. No signs of ischemia or infarction.

## 2024-07-03 NOTE — CERTIFIED RECOVERY SPECIALIST
Certified  Note    Patient name: Pravin Oliveros  Location: ED 09/ED 09  [place]: [facility]  Attending:  Cesar Mcnulty MD MRN [id number]  : 1985  Age: 38 y.o.    Sex: male Date 7/3/2024         Substance Use History:     Social History     Substance and Sexual Activity   Alcohol Use Not Currently    Comment: social        Social History     Substance and Sexual Activity   Drug Use Yes    Types: Methamphetamines, Fentanyl    Comment: reports snorting 6 bags fentanyl daily     Consult rcvd: N  Patient seen in: ED  Stage of change:  Action     Substance used:Fentanyl   Frequency/quantity :1 bundle day  Last use: 12:30am 2024  No hx of IVDU    History of withdrawal seizures: NO  Currently on MAT :No  Patient agreeable to start suboxone     CRS provided introductions and explanation of service. CRS and patient engaged in conversation of hospitalization. Patient discussed needs he feels would be appropriate. CRS shared understanding.     Patient interested stated he would like to go to detox. Discussed with treatment team to make proper referral     Will continue to follow until discharge        -    Admission Information  Substances Used at This Admission:: Opiates  Readmission in Last 30 Days?: No (last here OD 2024)  Encounter Type:: Patient Face-to-Face    Recovery Support Plan  Declined All Services?: No  Medication Assisted Treatment:: Yes (agreeable to suboxone)     Anirudh Mendes

## 2024-07-03 NOTE — ASSESSMENT & PLAN NOTE
Lab Results   Component Value Date    WBC 12.33 (H) 07/03/2024        Elevated WBC on admission  Possibly 2/2 leukemoid reaction from acute withdrawal  No evidence of active infection   Pt afebrile, VSS  Continue monitoring CBC, VS

## 2024-07-03 NOTE — ASSESSMENT & PLAN NOTE
Patient with a history of chronic benzodiazepine use   Last use  Ativan 2-3 mg daily, last use 2 days ago  Initiate [facility] protocol for medical management of benzodiazepine withdrawal  Current benzo withdrawal signs/symptoms include none   Continue monitoring under protocol and administer phenobarbital as indicated  Continuous pulse ox and telemetry monitoring  Encourage continued cessation of benzodiazepine use after withdrawal is fully treated

## 2024-07-03 NOTE — ASSESSMENT & PLAN NOTE
Admits to using ativan, fentanyl, and meth.   Last use of ativan 2 days prior to admission   Last use of fentanyl 7/3 12am   Last use of meth 1 week prior to admission

## 2024-07-03 NOTE — ASSESSMENT & PLAN NOTE
Patient with a history of chronic fentanyl use  Uses 10 bags daily via insufflation   Denies h/o IVDU  History of prior of MAT- was on suboxone 12 mg daily through cleanslate- per PDMP last filled 6/5/24   Management of opioid withdrawal under MAT protocol as above  Case management/CRS consulted for assistance with aftercare resources - pt expresses interest in outpatient MAT after d/c

## 2024-07-03 NOTE — ASSESSMENT & PLAN NOTE
Patient with a history of chronic opioid use  Last use was 7/3/24 12 am via insufflation   Initiate MAT/opioid withdrawal protocol with plan for eventual microinduction with Suboxone  Currently experiencing not experiencing any  opioid withdrawal s/sx  Initiate Butrans 20 mcg/hr patch   Continue monitoring opioid withdrawal consider microinduction when >24 hours have passed since pt's last opioid use,depending on severity of pt's w/d symptoms  Symptomatic and supportive care  Continuous pulse oximetry monitoring

## 2024-07-03 NOTE — ASSESSMENT & PLAN NOTE
Patient endorses a h/o chronic anxiety and depression  Denies SI/HI/thoughts of self harm  Continue home medications  Recommend OP f/u with PCP/OP Psychiatry

## 2024-07-03 NOTE — ASSESSMENT & PLAN NOTE
Patient with a h/o chronic benzodiazepine use  Using Ativan 2-3 mg daily   Denies H/o benzo withdrawal seizures  Withdrawal management [facility] as above  Recommend f/u with OP Psychiatry for continued psychiatric care  Consult case management/CRS for assistance with aftercare resources   Encourage cessation of benzodiazepine use

## 2024-07-04 VITALS
HEIGHT: 69 IN | TEMPERATURE: 98.1 F | SYSTOLIC BLOOD PRESSURE: 116 MMHG | RESPIRATION RATE: 18 BRPM | HEART RATE: 66 BPM | WEIGHT: 165 LBS | DIASTOLIC BLOOD PRESSURE: 66 MMHG | OXYGEN SATURATION: 98 % | BODY MASS INDEX: 24.44 KG/M2

## 2024-07-04 PROBLEM — R00.1 SINUS BRADYCARDIA: Status: RESOLVED | Noted: 2024-07-03 | Resolved: 2024-07-04

## 2024-07-04 LAB
ALBUMIN SERPL BCG-MCNC: 3.9 G/DL (ref 3.5–5)
ALP SERPL-CCNC: 70 U/L (ref 34–104)
ALT SERPL W P-5'-P-CCNC: 12 U/L (ref 7–52)
ANION GAP SERPL CALCULATED.3IONS-SCNC: 9 MMOL/L (ref 4–13)
AST SERPL W P-5'-P-CCNC: 15 U/L (ref 13–39)
BILIRUB SERPL-MCNC: 1.14 MG/DL (ref 0.2–1)
BUN SERPL-MCNC: 10 MG/DL (ref 5–25)
CALCIUM SERPL-MCNC: 9.2 MG/DL (ref 8.4–10.2)
CHLORIDE SERPL-SCNC: 104 MMOL/L (ref 96–108)
CO2 SERPL-SCNC: 26 MMOL/L (ref 21–32)
CREAT SERPL-MCNC: 0.92 MG/DL (ref 0.6–1.3)
ERYTHROCYTE [DISTWIDTH] IN BLOOD BY AUTOMATED COUNT: 11.8 % (ref 11.6–15.1)
GFR SERPL CREATININE-BSD FRML MDRD: 105 ML/MIN/1.73SQ M
GLUCOSE SERPL-MCNC: 96 MG/DL (ref 65–140)
HCT VFR BLD AUTO: 36.8 % (ref 36.5–49.3)
HGB BLD-MCNC: 12.8 G/DL (ref 12–17)
MCH RBC QN AUTO: 30.7 PG (ref 26.8–34.3)
MCHC RBC AUTO-ENTMCNC: 34.8 G/DL (ref 31.4–37.4)
MCV RBC AUTO: 93 FL (ref 82–98)
PLATELET # BLD AUTO: 372 THOUSANDS/UL (ref 149–390)
PMV BLD AUTO: 9.6 FL (ref 8.9–12.7)
POTASSIUM SERPL-SCNC: 3.9 MMOL/L (ref 3.5–5.3)
PROT SERPL-MCNC: 6.4 G/DL (ref 6.4–8.4)
RBC # BLD AUTO: 4.17 MILLION/UL (ref 3.88–5.62)
SODIUM SERPL-SCNC: 139 MMOL/L (ref 135–147)
WBC # BLD AUTO: 8.8 THOUSAND/UL (ref 4.31–10.16)

## 2024-07-04 PROCEDURE — 85027 COMPLETE CBC AUTOMATED: CPT

## 2024-07-04 PROCEDURE — 99238 HOSP IP/OBS DSCHRG MGMT 30/<: CPT

## 2024-07-04 PROCEDURE — 80053 COMPREHEN METABOLIC PANEL: CPT

## 2024-07-04 RX ORDER — BUPRENORPHINE 2 MG/1
0.5 TABLET SUBLINGUAL EVERY 4 HOURS
Status: DISCONTINUED | OUTPATIENT
Start: 2024-07-04 | End: 2024-07-04 | Stop reason: HOSPADM

## 2024-07-04 RX ADMIN — GABAPENTIN 300 MG: 300 CAPSULE ORAL at 05:15

## 2024-07-04 RX ADMIN — ONDANSETRON 4 MG: 2 INJECTION INTRAMUSCULAR; INTRAVENOUS at 08:33

## 2024-07-04 RX ADMIN — Medication 0.5 MG: at 08:53

## 2024-07-04 RX ADMIN — SODIUM CHLORIDE 125 ML/HR: 0.9 INJECTION, SOLUTION INTRAVENOUS at 03:48

## 2024-07-04 RX ADMIN — MULTIPLE VITAMINS W/ MINERALS TAB 1 TABLET: TAB ORAL at 08:33

## 2024-07-04 RX ADMIN — ACETAMINOPHEN 650 MG: 325 TABLET ORAL at 08:32

## 2024-07-04 NOTE — ASSESSMENT & PLAN NOTE
Patient with a history of chronic fentanyl use  Uses 10 bags daily via insufflation   Denies h/o IVDU  History of prior of MAT- was on suboxone 12 mg daily through cleanslate- per PDMP last filled 6/5/24   Management of opioid withdrawal under MAT protocol as above  Case management/CRS consulted for assistance with aftercare resources - pt discharged. Declined resources or case management assistance.

## 2024-07-04 NOTE — ASSESSMENT & PLAN NOTE
Patient with a history of chronic benzodiazepine use   Last use  Ativan 2-3 mg daily, last use 2 days ago  Initiate [facility] protocol for medical management of benzodiazepine withdrawal  Did not receive any phenobarbital during admission   Encourage continued cessation of benzodiazepine use after withdrawal is fully treated

## 2024-07-04 NOTE — ASSESSMENT & PLAN NOTE
Lab Results   Component Value Date    WBC 8.80 07/04/2024        Elevated WBC on admission  Possibly 2/2 leukemoid reaction from acute withdrawal  No evidence of active infection   Pt afebrile, VSS  Continue monitoring CBC, VS

## 2024-07-04 NOTE — PROGRESS NOTES
Pt requested discharge from detox unit advising staff that he had transport waiting for him at [facility].  CM unable to meet with Pt.  Pt educated on risk of discharge without a plan for sobriety.  Per provider, Pt states that he will be entering treatment but declines MAT or referral.  JUANA Collazo

## 2024-07-04 NOTE — ASSESSMENT & PLAN NOTE
Patient with a h/o chronic benzodiazepine use  Using Ativan 2-3 mg daily   Denies H/o benzo withdrawal seizures  Withdrawal [facility] as above  Recommend f/u with OP Psychiatry for continued psychiatric care  Consult case management/CRS for assistance with aftercare resources   Encourage cessation of benzodiazepine use

## 2024-07-04 NOTE — NURSING NOTE
Pt decided to leave detox unit abruptly, removing his own IV. RN was unable to print and review AVS. Personal belongings were returned, and pt was sent home with [employer].

## 2024-07-04 NOTE — DISCHARGE SUMMARY
[facility]  Discharge- Pravin Oliveros 1985, 38 y.o. male MRN: [id number]  Unit/Bed#: 5T DETOX 506-01 Encounter: [id number]  Primary Care Provider: No primary care provider on file.   Date and time admitted to hospital: 7/3/2024  6:20 AM  MEDICAL DETOX UNIT, LEVEL 4  Department of Medical Toxicology  Reason for Admission/Principal Problem: benzodiazepine withdrawal, opioid withdrawal   Admitting provider: KRISTINA Argueta MD   7/3/2024  6:20 AM       Discharging Physician / Practitioner: Cornelia Chambers PA-C  PCP: No primary care provider on file.  Admission Date:   Admission Orders (From admission, onward)       Ordered        07/03/24 0753  INPATIENT ADMISSION  Once                          Discharge Date: 07/04/24    Medical Problems       Resolved Problems  Date Reviewed: 11/24/2022            Resolved    Sinus bradycardia 7/4/2024     Resolved by  Cornelia Chambers PA-C          * Benzodiazepine withdrawal with complication (HCC)  Assessment & Plan  Patient with a history of chronic benzodiazepine use   Last use  Ativan 2-3 mg daily, last use 2 days ago  Initiate [facility] protocol for medical management of benzodiazepine withdrawal  Did not receive any phenobarbital during admission   Encourage continued cessation of benzodiazepine use after withdrawal is fully treated     Polysubstance abuse (HCC)  Assessment & Plan  Admits to using ativan, fentanyl, and meth.   Last use of ativan 2 days prior to admission   Last use of fentanyl 7/3 12am   Last use of meth 1 week prior to admission     Leukocytosis  Assessment & Plan  Lab Results   Component Value Date    WBC 8.80 07/04/2024        Elevated WBC on admission  Possibly 2/2 leukemoid reaction from acute withdrawal  No evidence of active infection   Pt afebrile, VSS  Continue monitoring CBC, VS     Opioid withdrawal (HCC)  Assessment & Plan  Patient with a history of chronic opioid use  Last use  was 7/3/24 12 am via insufflation   Initiate MAT/opioid withdrawal protocol with plan for eventual microinduction with Suboxone  Currently experiencing not experiencing any  opioid withdrawal s/sx: agitation, anxiety, rhinorrhea, myalgias, GI upset   Initiate Butrans 20 mcg/hr patch   Patient started on micro-induction. Received 1 dose of subutex and gabapentin 300 mg once. Patient ripped out his IV and requested to leave. Medication was offered to patient to help with withdrawal symptoms, patient denied stating that he wanted to leave immediately.   [employer] Community Kit provided to patient.     Opioid use disorder, severe, dependence (HCC)  Assessment & Plan  Patient with a history of chronic fentanyl use  Uses 10 bags daily via insufflation   Denies h/o IVDU  History of prior of MAT- was on suboxone 12 mg daily through [facility]- per PDMP last filled 6/5/24   Management of opioid withdrawal under MAT protocol as above  Case management/CRS consulted for assistance with aftercare resources - pt discharged. Declined resources or case management assistance.       Anxiety  Assessment & Plan  Patient endorses a h/o chronic anxiety and depression  Denies SI/HI/thoughts of self harm  Continue home medications  Recommend OP f/u with PCP/OP Psychiatry     Moderate benzodiazepine use disorder (HCC)  Assessment & Plan  Patient with a h/o chronic benzodiazepine use  Using Ativan 2-3 mg daily   Denies H/o benzo withdrawal seizures  Withdrawal management under [facility] protocol as above  Recommend f/u with OP Psychiatry for continued psychiatric care  Consult case management/CRS for assistance with aftercare resources   Encourage cessation of benzodiazepine use     Sinus bradycardia-resolved as of 7/4/2024  Assessment & Plan  Sinus Bradycardia in the setting of recent opioid use   HR 40 bpm   Telemetry monitoring         Consultations During Hospital Stay:  None    Procedures Performed:   None    Significant Findings / Test  Results:   None    Incidental Findings:   None     Test Results Pending at Discharge (will require follow up):   None     Outpatient Tests Requested:  Follow up with PCP    Complications:  none    Reason for Admission: benzodiazepine withdrawal, opioid withdrawal     Hospital Course:     Pravin Oliveros is a 38 y.o. male patient who originally presented to the hospital on 7/3/2024 due to benzodiazepine and opioid withdrawal. Patient initially presented to the [facility] ED requesting detoxification from benzos and opioids. Patient was admitted to the [facility] medical detox unit for medically assisted withdrawal management, and received a total of 0 mg phenobarbital under [facility] protocol for benzodiazepine w/d.    On admission, he was also placed on Butrans 20 mcg/hr patch to slowly introduce Suboxone and prevent precipitated withdrawal. On 7/4/24, patient underwent microinduction with 0.5 mg Subutex. Patient received 1 dose of subutex. Continued to report withdrawal symptoms of nausea, vomiting, GI upset, rhinorrhea. Patient was offered IV medication, patient ripped out his IV and requested immediate discharge. Despite offering more supportive medication, patient stated that he wanted to be discharged. Narcan kit provided to patient.       The patient, initially admitted to the hospital as inpatient, was discharged earlier than expected given the following: requested Discharge.    Please see above list of diagnoses and related plan for additional information.     Condition at Discharge: stable     Discharge Day Visit / Exam:     Subjective:  Patient seen this morning. Appeared to be in severe opioid withdrawal. Was > 36 hours after last use. Agreeable to begin low dose buprenorphine. After receiving one dose patient presented to nursing station after ripping out his IV.     Vitals: Blood Pressure: 116/66 (07/04/24 0717)  Pulse: 66 (07/04/24 0828)  Temperature: 98.1 °F (36.7 °C) (07/04/24 0717)  Temp Source: Temporal (07/04/24  "0717)  Respirations: 18 (07/04/24 0717)  Height: 5' 9\" (175.3 cm) (07/03/24 0850)  Weight - Scale: 74.8 kg (165 lb) (07/03/24 0850)  SpO2: 98 % (07/04/24 0717)  Exam:   Physical Exam  Vitals reviewed.   Constitutional:       General: He is in acute distress.      Appearance: He is diaphoretic.   Eyes:      General: No scleral icterus.     Pupils: Pupils are equal, round, and reactive to light.   Cardiovascular:      Rate and Rhythm: Normal rate and regular rhythm.      Heart sounds: No murmur heard.  Pulmonary:      Effort: No respiratory distress.      Breath sounds: Normal breath sounds.   Abdominal:      General: There is no distension.      Palpations: Abdomen is soft.   Neurological:      Mental Status: He is oriented to person, place, and time.      Motor: No tremor.   Psychiatric:         Mood and Affect: Mood is anxious. Mood is not depressed.       Discussion with Family: Discussed with patient     Discharge instructions/Information to patient and family:   See after visit summary for information provided to patient and family.      Provisions for Follow-Up Care:  See after visit summary for information related to follow-up care and any pertinent home health orders.      Disposition:     Home    For Discharges to [facility] SNF:   Not Applicable to this Patient - Not Applicable to this Patient    Planned Readmission: none     Discharge Statement:  I spent 30 minutes discharging the patient. This time was spent on the day of discharge. I had direct contact with the patient on the day of discharge. Greater than 50% of the total time was spent examining patient, answering all patient questions, arranging and discussing plan of care with patient as well as directly providing post-discharge instructions.  Additional time then spent on discharge activities.    Discharge Medications:  See after visit summary for reconciled discharge medications provided to patient and family.      ** Please Note: This " note has been constructed using a voice recognition system **

## 2024-07-04 NOTE — ASSESSMENT & PLAN NOTE
Patient with a history of chronic opioid use  Last use was 7/3/24 12 am via insufflation   Initiate MAT/opioid withdrawal protocol with plan for eventual microinduction with Suboxone  Currently experiencing not experiencing any  opioid withdrawal s/sx: agitation, anxiety, rhinorrhea, myalgias, GI upset   Initiate Butrans 20 mcg/hr patch   Patient started on micro-induction. Received 1 dose of subutex and gabapentin 300 mg once. Patient ripped out his IV and requested to leave. Medication was offered to patient to help with withdrawal symptoms, patient denied stating that he wanted to leave immediately.   Narcan Community Kit provided to patient.

## 2024-07-04 NOTE — ED PROVIDER NOTES
History  Chief Complaint   Patient presents with    Detox Evaluation     Requesting detox from fentanyl, ETOH and xanax. Snorts and smokes fentanyl, last used 1am. Does not drink alcohol daily. Uses 2mg xanax daily.      38-year-old male with history of substance abuse presents requesting detox for fentanyl and benzodiazepines.  Patient states that he relapsed 1 month ago and has been using approximately 1 bundle of fentanyl and 2 mg of Ativan daily.  Patient snorts or smokes the substances and does not inject any substances.  Denies alcohol use.  Patient was previously successful on Suboxone however relapsed when he missed an appointment and has been using for 1 month.  Patient is interested in going on Suboxone again.  Denies any complaints at this time.      History provided by:  Patient   used: No        Prior to Admission Medications   Prescriptions Last Dose Informant Patient Reported? Taking?   buprenorphine-naloxone (Suboxone) 2-0.5 mg Past Month  Yes Yes   Sig: Place 12 Film under the tongue daily   cloNIDine (Catapres) 0.1 mg tablet Not Taking  No No   Sig: Take 1 tablet (0.1 mg total) by mouth every 6 (six) hours as needed for high blood pressure   Patient not taking: Reported on 7/3/2024   gabapentin (Neurontin) 300 mg capsule Not Taking  No No   Sig: Take 1 capsule (300 mg total) by mouth every 6 (six) hours For post-herpetic neuralgia: Take 1 tablet on day 1,  Then take 2 tablets on day 2, Then take 3 tablets on day 3 and every day after that as instructed by your doctor.   Patient not taking: Reported on 7/3/2024   ondansetron (ZOFRAN-ODT) 4 mg disintegrating tablet Not Taking  No No   Sig: Take 1 tablet (4 mg total) by mouth every 6 (six) hours as needed for nausea or vomiting for up to 6 doses   Patient not taking: Reported on 7/3/2024      Facility-Administered Medications: None       Past Medical History:   Diagnosis Date    Anxiety     Drug abuse (HCC)     Leukocytosis  12/22/2018    Substance abuse (HCC)        History reviewed. No pertinent surgical history.    Family History   Problem Relation Age of Onset    Diabetes Brother     Heart disease Maternal Grandmother      I have reviewed and agree with the history as documented.    E-Cigarette/Vaping    E-Cigarette Use Never User      E-Cigarette/Vaping Substances     Social History     Tobacco Use    Smoking status: Every Day     Current packs/day: 0.25     Types: Cigarettes    Smokeless tobacco: Never    Tobacco comments:     occationally   Vaping Use    Vaping status: Never Used   Substance Use Topics    Alcohol use: Not Currently     Comment: social    Drug use: Yes     Types: Methamphetamines, Fentanyl     Comment: reports snorting 6 bags fentanyl daily       Review of Systems   Constitutional: Negative.  Negative for chills and fatigue.   HENT:  Negative for ear pain and sore throat.    Eyes:  Negative for photophobia and redness.   Respiratory:  Negative for apnea, cough and shortness of breath.    Cardiovascular:  Negative for chest pain.   Gastrointestinal:  Negative for abdominal pain, nausea and vomiting.   Genitourinary:  Negative for dysuria.   Musculoskeletal:  Negative for arthralgias, neck pain and neck stiffness.   Skin:  Negative for rash.   Neurological:  Negative for dizziness, tremors, syncope and weakness.   Psychiatric/Behavioral:  Negative for suicidal ideas.        Physical Exam  Physical Exam  Constitutional:       General: He is not in acute distress.     Appearance: He is well-developed. He is not diaphoretic.   Eyes:      Pupils: Pupils are equal, round, and reactive to light.   Cardiovascular:      Rate and Rhythm: Normal rate and regular rhythm.   Pulmonary:      Effort: Pulmonary effort is normal. No respiratory distress.      Breath sounds: Normal breath sounds.   Abdominal:      General: Bowel sounds are normal. There is no distension.      Palpations: Abdomen is soft.   Musculoskeletal:          General: Normal range of motion.      Cervical back: Normal range of motion and neck supple.   Skin:     General: Skin is warm and dry.   Neurological:      Mental Status: He is alert and oriented to person, place, and time.         Vital Signs  ED Triage Vitals   Temperature Pulse Respirations Blood Pressure SpO2   07/03/24 0626 07/03/24 0626 07/03/24 0626 07/03/24 0626 07/03/24 0626   98.9 °F (37.2 °C) 55 18 95/54 96 %      Temp Source Heart Rate Source Patient Position - Orthostatic VS BP Location FiO2 (%)   07/03/24 0626 07/03/24 0626 07/03/24 0626 07/03/24 0626 --   Oral Monitor Sitting Left arm       Pain Score       07/03/24 0850       No Pain           Vitals:    07/03/24 1026 07/03/24 1131 07/03/24 1459 07/03/24 1914   BP: 97/54 (!) 90/45 101/52 118/65   Pulse:  (!) 44 (!) 45 55   Patient Position - Orthostatic VS: Lying Lying Lying Lying         Visual Acuity  Visual Acuity      Flowsheet Row Most Recent Value   L Pupil Size (mm) 2   R Pupil Size (mm) 2   L Pupil Shape Round   R Pupil Shape Round            ED Medications  Medications   acetaminophen (TYLENOL) tablet 650 mg (has no administration in time range)   acetaminophen (TYLENOL) tablet 650 mg (has no administration in time range)   ondansetron (ZOFRAN) injection 4 mg (has no administration in time range)   traZODone (DESYREL) tablet 50 mg (has no administration in time range)   gabapentin (NEURONTIN) capsule 300 mg (300 mg Oral Given 7/3/24 1958)   multivitamin-minerals (CENTRUM) tablet 1 tablet (1 tablet Oral Not Given 7/3/24 0915)   transdermal buprenorphine (BUTRANS) 20 mcg/hr TD patch 20 mcg (20 mcg Transdermal Medication Applied 7/3/24 0923)   enoxaparin (LOVENOX) subcutaneous injection 40 mg (has no administration in time range)   naloxone (NARCAN) 0.04 mg/mL syringe 0.04 mg (has no administration in time range)   sodium chloride 0.9 % infusion (125 mL/hr Intravenous New Bag 7/3/24 2001)   melatonin tablet 6 mg (has no administration in time  range)   sodium chloride 0.9 % bolus 1,000 mL (1,000 mL Intravenous New Bag 7/3/24 0941)       Diagnostic Studies  Results Reviewed       Procedure Component Value Units Date/Time    Rapid drug screen, urine [[id number]]  (Abnormal) Collected: 07/03/24 1215    Lab Status: Final result Specimen: Urine, Other Updated: 07/03/24 1245     Amph/Meth UR Positive     Barbiturate Ur Negative     Benzodiazepine Urine Negative     Cocaine Urine Negative     Methadone Urine Negative     Opiate Urine Negative     PCP Ur Negative     THC Urine Negative     Oxycodone Urine Negative     Fentanyl Urine Positive     HYDROCODONE URINE Negative    Narrative:      Presumptive report. If requested, specimen will be sent to reference lab for confirmation.  FOR MEDICAL PURPOSES ONLY.   IF CONFIRMATION NEEDED PLEASE CONTACT THE LAB WITHIN 5 DAYS.    Drug Screen Cutoff Levels:  AMPHETAMINE/METHAMPHETAMINES  1000 ng/mL  BARBITURATES     200 ng/mL  BENZODIAZEPINES     200 ng/mL  COCAINE      300 ng/mL  METHADONE      300 ng/mL  OPIATES      300 ng/mL  PHENCYCLIDINE     25 ng/mL  THC       50 ng/mL  OXYCODONE      100 ng/mL  FENTANYL      5 ng/mL  HYDROCODONE     300 ng/mL    Comprehensive metabolic panel [[id number]]  (Abnormal) Collected: 07/03/24 0655    Lab Status: Final result Specimen: Blood from Arm, Right Updated: 07/03/24 0735     Sodium 138 mmol/L      Potassium 3.9 mmol/L      Chloride 99 mmol/L      CO2 34 mmol/L      ANION GAP 5 mmol/L      BUN 16 mg/dL      Creatinine 1.10 mg/dL      Glucose 114 mg/dL      Calcium 9.9 mg/dL      AST 18 U/L      ALT 17 U/L      Alkaline Phosphatase 72 U/L      Total Protein 7.0 g/dL      Albumin 4.3 g/dL      Total Bilirubin 0.61 mg/dL      eGFR 85 ml/min/1.73sq m     Narrative:      [employer] guidelines for Chronic Kidney Disease (CKD):     Stage 1 with normal or high GFR (GFR > 90 mL/min/1.73 square meters)    Stage 2 Mild CKD (GFR = 60-89 mL/min/1.73 square meters)     Stage 3A Moderate CKD (GFR = 45-59 mL/min/1.73 square meters)    Stage 3B Moderate CKD (GFR = 30-44 mL/min/1.73 square meters)    Stage 4 Severe CKD (GFR = 15-29 mL/min/1.73 square meters)    Stage 5 End Stage CKD (GFR <15 mL/min/1.73 square meters)  Note: GFR calculation is accurate only with a steady state creatinine    Magnesium [[id number]]  (Normal) Collected: 07/03/24 0655    Lab Status: Final result Specimen: Blood from Arm, Right Updated: 07/03/24 0735     Magnesium 2.2 mg/dL     CBC and differential [[id number]]  (Abnormal) Collected: 07/03/24 0655    Lab Status: Final result Specimen: Blood from Arm, Right Updated: 07/03/24 0711     WBC 12.33 Thousand/uL      RBC 3.94 Million/uL      Hemoglobin 12.3 g/dL      Hematocrit 37.4 %      MCV 95 fL      MCH 31.2 pg      MCHC 32.9 g/dL      RDW 11.8 %      MPV 9.1 fL      Platelets 347 Thousands/uL      nRBC 0 /100 WBCs      Segmented % 80 %      Immature Grans % 1 %      Lymphocytes % 12 %      Monocytes % 5 %      Eosinophils Relative 2 %      Basophils Relative 0 %      Absolute Neutrophils 9.95 Thousands/µL      Absolute Immature Grans 0.08 Thousand/uL      Absolute Lymphocytes 1.45 Thousands/µL      Absolute Monocytes 0.55 Thousand/µL      Eosinophils Absolute 0.25 Thousand/µL      Basophils Absolute 0.05 Thousands/µL     POCT alcohol breath test [[id number]]  (Normal) Resulted: 07/03/24 0655    Lab Status: Final result Updated: 07/03/24 0655     EXTBreath Alcohol 0.0                   No orders to display              Procedures  Procedures         ED Course            Clinical Opiate Withdrawal Scale       Row Name 07/03/24 0944                Pulse --        Resting Pulse Rate: Measured After Patient is Sitting or Lying for One Minute 0  -CR        GI Upset: Over Last Half Hour 0  -CR        Sweating: Over Past Half Hour Not Accounted for by Room Temperature of Patient Activity 0  -CR        Tremor: Observation of Outstretched Hands 0  -CR         Restlessness: Observation During Assessment 0  -CR        Yawning: Observation During Assessment 0  -CR        Pupil Size 0  -CR        Anxiety and Irritability 0  -CR        Bone or Joint Aches: If Patient was Having Pain Previously, Only the Additional Component Attributed to Opiate Withdrawal is Scored 0  -CR        Gooseflesh Skin 0  -CR        Runny Nose or Tearing: Not Accounted for by Cold Symptoms or Allergies 0  -CR        Clinical Opiate Withdrawal Scale Total Score 0  -CR        Heart Rate Source --        Patient Position - Orthostatic VS --                  User Key  (r) = Recorded By, (t) = Taken By, (c) = Cosigned By      Initials Name    MAYELIN Coronel                                    SBIRT 22yo+      Flowsheet Row Most Recent Value   Initial Alcohol Screen: US AUDIT-C     1. How often do you have a drink containing alcohol? 4 Filed at: 07/03/2024 0631   2. How many drinks containing alcohol do you have on a typical day you are drinking?  2 Filed at: 07/03/2024 0631   3a. Male UNDER 65: How often do you have five or more drinks on one occasion? 2 Filed at: 07/03/2024 0631   Audit-C Score 8 Filed at: 07/03/2024 0631   Full Alcohol Screen: US AUDIT    4. How often during the last year have you found that you were not able to stop drinking once you had started? 1 Filed at: 07/03/2024 0631   5. How often during past year have you failed to do what was normally expected of you because of drinking?  2 Filed at: 07/03/2024 0631   6. How often in past year have you needed a first drink in the morning to get yourself going after a heavy drinking session?  0 Filed at: 07/03/2024 0631   7. How often in past year have you had feeling of guilt or remorse after drinking?  2 Filed at: 07/03/2024 0631   8. How often in past year have you been unable to remember what happened night before because you had been drinking?  2 Filed at: 07/03/2024 0631   9. Have you or someone else been injured as a result of your  "drinking?  0 Filed at: 07/03/2024 0631   10. Has a relative, friend, doctor or other health worker been concerned about your drinking and suggested you cut down?  0 Filed at: 07/03/2024 0631   AUDIT Total Score 15 Filed at: 07/03/2024 0631   EYAL: How many times in the past year have you...    Used an illegal drug or used a prescription medication for non-medical reasons? Daily or Almost Daily Filed at: 07/03/2024 0631   DAST-10: In the past 12 months...    1. Have you used drugs other than those required for medical reasons? 1 Filed at: 07/03/2024 0631   2. Do you use more than one drug at a time? 1 Filed at: 07/03/2024 0631   3. Have you had medical problems as a result of your drug use (e.g., memory loss, hepatitis, convulsions, bleeding, etc.)? 0 Filed at: 07/03/2024 0631   4. Have you had \"blackouts\" or \"flashbacks\" as a result of drug use?YesNo 1 Filed at: 07/03/2024 0631   5. Do you ever feel bad or guilty about your drug use? 1 Filed at: 07/03/2024 0631   6. Does your spouse (or parent) ever complain about your involvement with drugs? 0 Filed at: 07/03/2024 0631   7. Have you neglected your family because of your use of drugs? 1 Filed at: 07/03/2024 0631   8. Have you engaged in illegal activities in order to obtain drugs? 0 Filed at: 07/03/2024 0631   9. Have you ever experienced withdrawal symptoms (felt sick) when you stopped taking drugs? 1 Filed at: 07/03/2024 0631   10. Are you always able to stop using drugs when you want to? 1 Filed at: 07/03/2024 0631   DAST-10 Score 7 Filed at: 07/03/2024 0631                      Medical Decision Making  Patient was initially seen and evaluated by myself for medical clearance for detox admission.  Laboratory evaluation was ordered and then care was signed out to attending pending completion of laboratory evaluation medical clearance and admission.  Please see separate note for further evaluation.    Amount and/or Complexity of Data Reviewed  Labs: " ordered.    Risk  Decision regarding hospitalization.             Disposition  Final diagnoses:   Polysubstance abuse (HCC)   Opioid use disorder, severe, dependence (HCC)     Time reflects when diagnosis was documented in both MDM as applicable and the Disposition within this note       Time User Action Codes Description Comment    7/3/2024  7:52 AM Cesar Mcnulty Add [F19.10] Polysubstance abuse (HCC)     7/3/2024  9:01 AM Cornelia Chambers Add [F11.20] Opioid use disorder, severe, dependence (HCC)           ED Disposition       ED Disposition   Admit    Condition   Stable    Date/Time   Wed Jul 3, 2024 0752    Comment   Case was discussed with Dr. Forrest and the patient's admission status was agreed to be Admission Status: inpatient status to the service of Dr. Forrest.               Follow-up Information    None         Current Discharge Medication List        CONTINUE these medications which have NOT CHANGED    Details   buprenorphine-naloxone (Suboxone) 2-0.5 mg Place 12 Film under the tongue daily      cloNIDine (Catapres) 0.1 mg tablet Take 1 tablet (0.1 mg total) by mouth every 6 (six) hours as needed for high blood pressure  Qty: 12 tablet, Refills: 0    Associated Diagnoses: Accidental overdose, initial encounter      gabapentin (Neurontin) 300 mg capsule Take 1 capsule (300 mg total) by mouth every 6 (six) hours For post-herpetic neuralgia: Take 1 tablet on day 1,  Then take 2 tablets on day 2, Then take 3 tablets on day 3 and every day after that as instructed by your doctor.  Qty: 12 capsule, Refills: 0    Associated Diagnoses: Accidental overdose, initial encounter      ondansetron (ZOFRAN-ODT) 4 mg disintegrating tablet Take 1 tablet (4 mg total) by mouth every 6 (six) hours as needed for nausea or vomiting for up to 6 doses  Qty: 6 tablet, Refills: 0    Associated Diagnoses: Axillary abscess             No discharge procedures on file.    PDMP Review         Value Time User    PDMP Reviewed  Yes  7/3/2024  9:06 AM Cornelia Chambers PA-C            ED Provider  Electronically Signed by             Mackenzie Carr PA-C  07/03/24 2046

## 2024-07-04 NOTE — PLAN OF CARE
Problem: SUBSTANCE USE/ABUSE  Goal: By discharge, will develop insight into their chemical dependency and sustain motivation to continue in recovery  Description: INTERVENTIONS:  - Attends all daily group sessions and scheduled AA groups  - Actively practices coping skills through participation in the therapeutic community and adherence to program rules  - Reviews and completes assignments from individual treatment plan  - Assist patient development of understanding of their personal cycle of addiction and relapse triggers  7/3/2024 2051 by Kristin Chiu  Outcome: Progressing  7/3/2024 2051 by Kristin Chiu  Outcome: Progressing  Goal: By discharge, patient will have ongoing treatment plan addressing chemical dependency  Description: INTERVENTIONS:  - Assist patient with resources and/or appointments for ongoing recovery based living  7/3/2024 2051 by Kristin Chiu  Outcome: Progressing  7/3/2024 2051 by Kristin Chiu  Outcome: Progressing     Problem: PAIN - ADULT  Goal: Verbalizes/displays adequate comfort level or baseline comfort level  Description: Interventions:  - Encourage patient to monitor pain and request assistance  - Assess pain using appropriate pain scale  - Administer analgesics based on type and severity of pain and evaluate response  - Implement non-pharmacological measures as appropriate and evaluate response  - Consider cultural and social influences on pain and pain management  - Notify physician/advanced practitioner if interventions unsuccessful or patient reports new pain  Reactivated     Problem: INFECTION - ADULT  Goal: Absence or prevention of progression during hospitalization  Description: INTERVENTIONS:  - Assess and monitor for signs and symptoms of infection  - Monitor lab/diagnostic results  - Monitor all insertion sites, i.e. indwelling lines, tubes, and drains  - Monitor endotracheal if appropriate and nasal secretions for changes in amount and color  - [place] appropriate  cooling/warming therapies per order  - Administer medications as ordered  - Instruct and encourage patient and family to use good hand hygiene technique  - Identify and instruct in appropriate isolation precautions for identified infection/condition  Reactivated  Goal: Absence of fever/infection during neutropenic period  Description: INTERVENTIONS:  - Monitor WBC    Reactivated     Problem: SAFETY ADULT  Goal: Patient will remain free of falls  Description: INTERVENTIONS:  - Educate patient/family on patient safety including physical limitations  - Instruct patient to call for assistance with activity   - Consult OT/PT to assist with strengthening/mobility   - Keep Call bell within reach  - Keep bed low and locked with side rails adjusted as appropriate  - Keep care items and personal belongings within reach  - Initiate and maintain comfort rounds  - Make Fall Risk Sign visible to staff  - Apply yellow socks and bracelet for high fall risk patients  - Consider moving patient to room near nurses station  Reactivated  Goal: Maintain or return to baseline ADL function  Description: INTERVENTIONS:  -  Assess patient's ability to carry out ADLs; assess patient's baseline for ADL function and identify physical deficits which impact ability to perform ADLs (bathing, care of mouth/teeth, toileting, grooming, dressing, etc.)  - Assess/evaluate cause of self-care deficits   - Assess range of motion  - Assess patient's mobility; develop plan if impaired  - Assess patient's need for assistive devices and provide as appropriate  - Encourage maximum independence but intervene and supervise when necessary  - Involve family in performance of ADLs  - Assess for home care needs following discharge   - Consider OT consult to assist with ADL evaluation and planning for discharge  - Provide patient education as appropriate  Reactivated  Goal: Maintains/Returns to pre admission functional level  Description: INTERVENTIONS:  - Perform  AM-PAC 6 Click Basic Mobility/ Daily Activity assessment daily.  - Set and communicate daily mobility goal to care team and patient/family/caregiver.   - Collaborate with rehabilitation services on mobility goals if consulted  - Out of bed for toileting  - Record patient progress and toleration of activity level   Reactivated     Problem: DISCHARGE PLANNING  Goal: Discharge to home or other facility with appropriate resources  Description: INTERVENTIONS:  - Identify barriers to discharge w/patient and caregiver  - Arrange for needed discharge resources and transportation as appropriate  - Identify discharge learning needs (meds, wound care, etc.)  - Arrange for interpretive services to assist at discharge as needed  - Refer to Case Management Department for coordinating discharge planning if the patient needs post-hospital services based on physician/advanced practitioner order or complex needs related to functional status, cognitive ability, or social support system  Reactivated     Problem: Knowledge Deficit  Goal: Patient/family/caregiver demonstrates understanding of disease process, treatment plan, medications, and discharge instructions  Description: Complete learning assessment and assess knowledge base.  Interventions:  - Provide teaching at level of understanding  - Provide teaching via preferred learning methods  Reactivated

## 2024-07-05 NOTE — UTILIZATION REVIEW
Initial Clinical Review    Pt presented to [facility] for its Level IV medically managed intensive inpatient detox unit.    Admission: Date/Time/Statement:   Admission Orders (From admission, onward)       Ordered        07/03/24 0753  INPATIENT ADMISSION  Once                          Orders Placed This Encounter   Procedures    INPATIENT ADMISSION     Standing Status:   Standing     Number of Occurrences:   1     Order Specific Question:   Level of Care     Answer:   Med Surg [16]     Order Specific Question:   Estimated length of stay     Answer:   More than 2 Midnights     Order Specific Question:   Certification     Answer:   I certify that inpatient services are medically necessary for this patient for a duration of greater than two midnights. See H&P and MD Progress Notes for additional information about the patient's course of treatment.     ED Arrival Information       Expected   -    Arrival   7/3/2024 06:15    Acuity   Urgent              Means of arrival   Walk-In    Escorted by   Self    Service   Emergency Medicine    Admission type   Emergency              Arrival complaint   detox eval             Chief Complaint   Patient presents with    Detox Evaluation     Requesting detox from fentanyl, ETOH and xanax. Snorts and smokes fentanyl, last used 1am. Does not drink alcohol daily. Uses 2mg xanax daily.        Initial Presentation: 38 y.o. male who presented to medical detox. Inpatient admission for evaluation and treatment of polysubstance withdrawal syndrome. Presented w/ need for detox from polysubstances. UDS positive: amphetamines. Reports 1 bundle of fentanyl via insufflation daily, last used on 7/3 @ midnight. Pt reports using Ativan 3 mg qHS, last used 2 days ago. Notes last amphetamine use approximately 1 week ago. On exam, dry mucous membranes, bradycardic, somnolent. COWS 0. Plan: COWS monitoring w/ phenobarbital management, Butrans patch, IVF, telemetry, continuous pulse ox,  micro-induction of Suboxone when clinically indicatied, trend labs, replete electrolytes as needed.     Anticipated Length of Stay:  Patient will be admitted on an Inpatient basis with an anticipated length of stay of  2  midnights.   Justification for Hospital Stay: opioid, benzo withdrawal       This patient qualifies for Level IV medically managed intensive inpatient services under the criteria set by the American Society of Addiction Medicine, including dimensions 1-3. The patient is in withdrawal (or is intoxicated with high risk of withdrawal), with severe and unstable medical and/or psychiatric (dual diagnosis) problems, requiring requires 24-hour medical and nursing care and the full resources of a [facility] hospital.      Date: 07/04/24       Day 2: Patient underwent microinduction with 0.5 mg Subutex. Patient received 1 dose of subutex. Continued to report withdrawal symptoms of nausea, vomiting, GI upset, rhinorrhea. Patient was offered IV medication, patient ripped out his IV and requested immediate discharge. Despite offering more supportive medication, patient stated that he wanted to be discharged. Narcan kit provided to patient. The patient, initially admitted to the hospital as inpatient, wa    ED Triage Vitals   Temperature Pulse Respirations Blood Pressure SpO2 Pain Score   07/03/24 0626 07/03/24 0626 07/03/24 0626 07/03/24 0626 07/03/24 0626 07/03/24 0850   98.9 °F (37.2 °C) 55 18 95/54 96 % No Pain     Weight (last 2 days) before discharge       Date/Time Weight    07/03/24 0850 74.8 (165)    07/03/24 0626 75.7 (166.8)            Vital Signs (last 3 days) before discharge       Date/Time Temp Pulse Resp BP MAP (mmHg) SpO2 O2 Device Patient Position - Orthostatic VS Flavio Coma Scale Score CIWA-Ar Total Clinical Opiate Withdrawal Scale Total Score Pain    07/04/24 0853 -- -- -- -- -- -- -- -- -- -- -- Med Not Given for Pain - for MAR use only    07/04/24 0832 -- -- -- -- -- -- -- -- 15 -- -- 9     07/04/24 0828 -- 66 -- -- -- -- -- -- -- -- 18 --    07/04/24 0717 98.1 °F (36.7 °C) 65 18 116/66 82 98 % None (Room air) Lying -- -- -- --    07/04/24 0509 98.2 °F (36.8 °C) 72 18 122/70 87 100 % None (Room air) Lying -- -- -- --    07/03/24 1945 -- -- -- -- -- -- -- -- 15 -- -- No Pain    07/03/24 1914 97.5 °F (36.4 °C) 55 18 118/65 82 100 % None (Room air) Lying -- -- -- --    07/03/24 1459 97.5 °F (36.4 °C) 45 18 101/52 68 98 % None (Room air) Lying -- -- -- --    07/03/24 1131 97.6 °F (36.4 °C) 44 16 90/45 60 98 % None (Room air) Lying -- -- -- --    07/03/24 1026 -- -- -- 97/54 68 -- -- Lying -- -- -- --    07/03/24 0947 97.5 °F (36.4 °C) 40 15 94/57 69 97 % None (Room air) Lying -- -- 0 --    07/03/24 0855 -- -- -- -- -- -- -- -- 15 -- -- No Pain    07/03/24 0850 97.5 °F (36.4 °C) 45 15 93/49 63 97 % None (Room air) Lying -- -- -- No Pain    07/03/24 0700 -- 57 -- 97/55 -- -- -- -- -- 0 -- --    07/03/24 0626 98.9 °F (37.2 °C) 55 18 95/54 -- 96 % None (Room air) Sitting -- -- -- --          Clinical Opiate Withdrawal Scale   07/04/24 0828 07/03/24 0947   Resting Pulse Rate: Measured After Patient is Sitting or Lying for One Minute 0  -CR 0   GI Upset: Over Last Half Hour 3  -CR 0   Sweating: Over Past Half Hour Not Accounted for by Room Temperature of Patient Activity 2  -CR 0   Tremor: Observation of Outstretched Hands 2  -CR 0   Restlessness: Observation During Assessment 3  -CR 0   Yawning: Observation During Assessment 1  -CR 0   Pupil Size 2  -CR 0   Anxiety and Irritability 2  -CR 0   Bone or Joint Aches: If Patient was Having Pain Previously, Only the Additional Component Attributed to Opiate Withdrawal is Scored 2  -CR 0   Gooseflesh Skin 0  -CR 0   Runny Nose or Tearing: Not Accounted for by Cold Symptoms or Allergies 1  -CR 0   Clinical Opiate Withdrawal Scale Total Score 18  -CR 0        CIWA-Ar Score       Row Name 07/03/24 0700             CIWA-Ar    BP 97/55      Pulse 57      Nausea and  Vomiting 0      Tactile Disturbances 0      Tremor 0      Auditory Disturbances 0      Paroxysmal Sweats 0      Visual Disturbances 0      Anxiety 0      Headache, Fullness in Head 0      Agitation 0      Orientation and Clouding of Sensorium 0      CIWA-Ar Total 0                      Pertinent Labs/Diagnostic Test Results:   Cardiology:  ECG 12 lead   Final Result by Waylon Forrest DO (07/03 1057)   Steven Hagen PA-C     7/3/2024 10:49 AM   EKG reviewed by me.   Normal sinus rhythm, bradycardia at a rate of 39.    Normal Axis.   , . No block. No signs of ischemia or infarction.         ECG 12 lead   Final Result by Hernan Alberts MD (07/03 1832)   Marked sinus bradycardia   Abnormal ECG   When compared with ECG of 03-JUL-2024 06:55, (unconfirmed)   No significant change was found   Confirmed by Hernan Alberts ([id number]) on 7/3/2024 6:32:03 PM      ECG 12 lead   Final Result by Hernan Alberts MD (07/03 1831)   Sinus bradycardia   Otherwise normal ECG   When compared with ECG of 19-SEP-2023 02:04,   Vent. rate has decreased BY  27 BPM   ST elevation now present in Anterior leads   Nonspecific T wave abnormality no longer evident in Anterolateral leads   QT has shortened   Confirmed by Hernan Alberts ([id number]) on 7/3/2024 6:31:31 PM          Results from last 7 days   Lab Units 07/04/24  0516 07/03/24  0655   WBC Thousand/uL 8.80 12.33*   HEMOGLOBIN g/dL 12.8 12.3   HEMATOCRIT % 36.8 37.4   PLATELETS Thousands/uL 372 347   TOTAL NEUT ABS Thousands/µL  --  9.95*         Results from last 7 days   Lab Units 07/04/24  0516 07/03/24  0655   SODIUM mmol/L 139 138   POTASSIUM mmol/L 3.9 3.9   CHLORIDE mmol/L 104 99   CO2 mmol/L 26 34*   ANION GAP mmol/L 9 5   BUN mg/dL 10 16   CREATININE mg/dL 0.92 1.10   EGFR ml/min/1.73sq m 105 85   CALCIUM mg/dL 9.2 9.9   MAGNESIUM mg/dL  --  2.2     Results from last 7 days   Lab Units 07/04/24  0516 07/03/24  0655   AST U/L 15 18   ALT U/L 12 17   ALK PHOS U/L 70 72    TOTAL PROTEIN g/dL 6.4 7.0   ALBUMIN g/dL 3.9 4.3   TOTAL BILIRUBIN mg/dL 1.14* 0.61         Results from last 7 days   Lab Units 07/04/24  0516 07/03/24  0655   GLUCOSE RANDOM mg/dL 96 114      Results from last 7 days   Lab Units 07/03/24  1215   AMPH/METH  Positive*   BARBITURATE UR  Negative   BENZODIAZEPINE UR  Negative   COCAINE UR  Negative   METHADONE URINE  Negative   OPIATE UR  Negative   PCP UR  Negative   THC UR  Negative                Past Medical History:   Diagnosis Date    Anxiety     Drug abuse (HCC)     Leukocytosis 12/22/2018    Substance abuse (HCC)      Present on Admission:   Opioid use disorder, severe, dependence (HCC)   Opioid withdrawal (HCC)   Moderate benzodiazepine use disorder (HCC)   Anxiety      Admitting Diagnosis: Polysubstance abuse ([facility]) [F19.10]  Encounter for assessment of alcohol and drug use [Z76.89]  Age/Sex: 38 y.o. male  Admission Orders:  Regular Diet.   I&O. SCDs.  Fall & Seizure Precautions.  SEWS monitoring.  Telemetry & Continuous Pulse Ox.    Scheduled Medications:  enoxaparin, 40 mg, Subcutaneous, Daily  multivitamin-minerals, 1 tablet, Oral, Daily  transdermal buprenorphine, 20 mcg, Transdermal, Q7days    Continuous IV Infusions:  sodium chloride 0.9 %, 125 mL/hr, Intravenous, Continuous    PRN Meds:  acetaminophen, 650 mg, Oral, Q6H PRN; 7/4 c1  gabapentin, 300 mg, Oral, Q8H PRN; 7/3 x1, 7/4 x1  melatonin, 6 mg, Oral, HS PRN  naloxone, 0.05 mg, Intravenous, Q1 min PRN  ondansetron, 4 mg, Intravenous, Q6H PRN; 7/4 x1  traZODone, 50 mg, Oral, HS PRN      buprenorphine (SUBUTEX) 0.5 mg SL tablet (partial) 0.5 mg  Dose: 0.5 mg  Freq: Every 4 hours Route: SL  Start: 07/04/24 0845 End: 07/04/24 1229  sodium chloride 0.9 % bolus 1,000 mL  Dose: 1,000 mL  Freq: Once Route: IV  Start: 07/03/24 0945 End: 07/03/24 0941    Network Utilization Review Department  ATTENTION: Please call with any questions or concerns to [phone] and carefully listen to the prompts so that  you are directed to the right person. All voicemails are confidential.   For Discharge needs, contact Care Management DC Support Team at [phone] opt. 2  Send all requests for admission clinical reviews, approved or denied determinations and any other requests to dedicated fax number below belonging to the campus where the patient is receiving treatment. List of dedicated fax numbers for the Facilities:  FACILITY NAME UR FAX NUMBER   ADMISSION DENIALS (Administrative/Medical Necessity) [phone]   DISCHARGE SUPPORT TEAM (NETWORK) [phone]   PARENT CHILD HEALTH (Maternity/NICU/Pediatrics) [phone]   [facility] [phone]   [facility] [phone]   [facility] [phone]   [facility] [phone]   [facility] [phone]   [facility] [phone]   [facility] [phone]   [facility] [phone]   [facility] [phone]   [facility] [phone]   [facility] [phone]   [facility] [phone]

## 2024-07-05 NOTE — UTILIZATION REVIEW
NOTIFICATION OF INPATIENT MEDICAL ADMISSION   AUTHORIZATION REQUEST   SERVICING FACILITY:   [address] [address]  Tax ID: [id number]  NPI: [id number] ATTENDING PROVIDER:  Attending Name and NPI#: Lacy Flores Md [[id number]]  Address: [address] [place] [address]  Phone: [phone]     ADMISSION INFORMATION:  Place of Service: Inpatient Acute [facility]  Place of Service Code: 21  Inpatient Admission Date/Time: 7/3/24  7:53 AM  Discharge Date/Time: 7/4/2024 10:25 AM  Admitting Diagnosis Code/Description:  Polysubstance abuse (HCC) [F19.10]  Encounter for assessment of alcohol and drug use [Z76.89]     UTILIZATION REVIEW CONTACT:  Ida Law Utilization   Network Utilization Review Department  Phone: [phone]  Fax [phone]  Email: [email or web address]  Contact for approvals/pending authorizations, clinical reviews, and discharge.     PHYSICIAN ADVISORY SERVICES:  Medical Necessity Denial & Zjyk-no-Xemm Review  Phone: [phone]  Fax: [phone]  Email: [email or web address]     DISCHARGE SUPPORT TEAM:  For Patients Discharge Needs & Updates  Phone: [phone] opt. 2 Fax: [phone]  Email: [email or web address]

## 2024-07-05 NOTE — UTILIZATION REVIEW
NOTIFICATION OF ADMISSION DISCHARGE   This is a Notification of Discharge from [facility]. Please be advised that this patient has been discharge from our facility. Below you will find the admission and discharge date and time including the patient’s disposition.   UTILIZATION REVIEW CONTACT:  Ida Law MA  Utilization   Network Utilization Review Department  Phone: [phone] x carefully listen to the prompts. All voicemails are confidential.  Email: [email or web address]     ADMISSION INFORMATION  PRESENTATION DATE: 7/3/2024  6:20 AM  OBERVATION ADMISSION DATE: N/A  INPATIENT ADMISSION DATE: 7/3/24  7:53 AM   DISCHARGE DATE: 7/4/2024 10:25 AM   DISPOSITION:Home/Self Care    Network Utilization Review Department  ATTENTION: Please call with any questions or concerns to [phone] and carefully listen to the prompts so that you are directed to the right person. All voicemails are confidential.   For Discharge needs, contact Care Management DC Support Team at [phone] opt. 2  Send all requests for admission clinical reviews, approved or denied determinations and any other requests to dedicated fax number below belonging to the campus where the patient is receiving treatment. List of dedicated fax numbers for the Facilities:  FACILITY NAME UR FAX NUMBER   ADMISSION DENIALS (Administrative/Medical Necessity) [phone]   DISCHARGE SUPPORT TEAM ([facility]) [phone]   PARENT CHILD HEALTH (Maternity/NICU/Pediatrics) [phone]   [facility] [phone]   [facility] [phone]   [facility] [phone]   [facility] [phone]   [facility] [phone]   [facility] [phone]   [facility] [phone]   [facility]  [phone]   [facility] [phone]   [facility] [phone]   [facility] [phone]   [facility] [phone]

## 2024-08-23 NOTE — ASSESSMENT & PLAN NOTE
· Endorses almost daily use of methamphetamine and crack cocaine  · Reports ongoing insomnia and anxiety  Denies chest pain, palpitations  · EKG reviewed: 72 bpm, NSR, non specific ST segment changes in lateral leads   QTC 457ms   · Encourage cessation Opt out

## 2024-09-19 ENCOUNTER — OFFICE VISIT (OUTPATIENT)
Dept: FAMILY MEDICINE CLINIC | Facility: CLINIC | Age: 39
End: 2024-09-19
Payer: COMMERCIAL

## 2024-09-19 VITALS
OXYGEN SATURATION: 97 % | SYSTOLIC BLOOD PRESSURE: 119 MMHG | HEIGHT: 69 IN | HEART RATE: 86 BPM | BODY MASS INDEX: 26.87 KG/M2 | DIASTOLIC BLOOD PRESSURE: 65 MMHG | RESPIRATION RATE: 16 BRPM | WEIGHT: 181.4 LBS | TEMPERATURE: 98.7 F

## 2024-09-19 DIAGNOSIS — M79.671 FOOT PAIN, RIGHT: Primary | ICD-10-CM

## 2024-09-19 DIAGNOSIS — G25.81 RLS (RESTLESS LEGS SYNDROME): ICD-10-CM

## 2024-09-19 DIAGNOSIS — F17.200 TOBACCO DEPENDENCE: Chronic | ICD-10-CM

## 2024-09-19 DIAGNOSIS — R39.12 WEAK URINARY STREAM: ICD-10-CM

## 2024-09-19 PROCEDURE — 99214 OFFICE O/P EST MOD 30 MIN: CPT | Performed by: FAMILY MEDICINE

## 2024-09-19 RX ORDER — BUPRENORPHINE 300 MG/1
SOLUTION SUBCUTANEOUS
COMMUNITY
Start: 2024-08-23

## 2024-09-19 RX ORDER — QUETIAPINE FUMARATE 25 MG/1
50 TABLET, FILM COATED ORAL
COMMUNITY

## 2024-09-19 RX ORDER — BUPRENORPHINE 96 MG/.27ML
INJECTION SUBCUTANEOUS
COMMUNITY
Start: 2024-09-12

## 2024-09-19 NOTE — PROGRESS NOTES
Ambulatory Visit  Name: Pravin Oliveros      : 1985      MRN: [id number]  Encounter Provider: Pk Jolly DO  Encounter Date: 2024   Encounter department: [facility]    Assessment & Plan  Foot pain, right  - Patient noting generalized right foot pain with swelling for a few weeks  - Was started on steroid taper by  which he reports has helped; not currently in pain  - Notes pain/discomfort described as aching pain in the entire foot that is worse at night and relieved with walking   - Notes walking on the lateral edge of his foot has helped as well  - Physical examination unremarkable for any foot deformity, pes planus, swelling, erythema or joint/bone tenderness to palpation  - Concern for arthritis (atypical presentation) vs plantar fasciitis vs RLS     Plan  - Start Voltaren gel QID PRN foot pain  - Counseled on the use of NSAID's and long term side effects of chronic use   - Consider Podiatry referral     Orders:    Diclofenac Sodium (VOLTAREN) 1 %; Apply 2 g topically 4 (four) times a day    Iron Panel (Includes Ferritin, Iron Sat%, Iron, and TIBC); Future    Vitamin B12; Future    Weak urinary stream  - Patient noting issue with weakened urinary stream lately  - He denies any dysuria, straining to urinate, frequent night time awakenings  - Reports staying hydrated and notes urine is always light yellow-clear   - No known STDs, family hx of prostate CA  - No history of kidney stones/flank pain  - No interested in starting possible alleviating medications at this time, prefers f/u w/ Urology when offered     Plan  - F/u PSA  - F/u Urology  - Consider starting on Flomax   Orders:    PSA, Total Screen; Future    Ambulatory Referral to Urology; Future    RLS (restless legs syndrome)  - Possible ddx for symptoms above   Orders:    Iron Panel (Includes Ferritin, Iron Sat%, Iron, and TIBC); Future    Tobacco dependence  - Continue to  patient of quitting  and long term repercussions of not             History of Present Illness   Patient noting feet are hurting, was given steroid that helped.   Patient notes pain in feet started with Ashwaganda.   Notes relief with walking on feet.   [facility]   Notes pain is worse at night  Pain is encompasses the whole foot on right. Gets swollen.       Review of Systems   Constitutional:  Negative for fatigue and unexpected weight change.   HENT:  Negative for congestion and rhinorrhea.    Eyes:  Negative for visual disturbance.   Respiratory:  Negative for shortness of breath.    Cardiovascular:  Negative for chest pain and palpitations.   Gastrointestinal:  Negative for constipation, diarrhea, nausea and vomiting.   Endocrine: Negative for polydipsia and polyuria.   Genitourinary:  Positive for difficulty urinating. Negative for decreased urine volume and dysuria.   Musculoskeletal:  Positive for arthralgias. Negative for back pain and gait problem.   Skin:  Negative for color change.   Neurological:  Negative for dizziness, light-headedness and headaches.     Past Medical History:   Diagnosis Date    Anxiety     Drug abuse ([facility])     Leukocytosis 12/22/2018    Substance abuse ([facility])      History reviewed. No pertinent surgical history.  Family History   Problem Relation Age of Onset    Diabetes Brother     Heart disease Maternal Grandmother      Social History     Tobacco Use    Smoking status: Every Day     Current packs/day: 0.25     Types: Cigarettes    Smokeless tobacco: Never    Tobacco comments:     occationally   Vaping Use    Vaping status: Never Used   Substance and Sexual Activity    Alcohol use: Not Currently     Comment: social    Drug use: Yes     Types: Methamphetamines, Fentanyl     Comment: reports snorting 6 bags fentanyl daily    Sexual activity: Not on file     Current Outpatient Medications on File Prior to Visit   Medication Sig    QUEtiapine (SEROquel) 25 mg tablet Take 50  "mg by mouth daily at bedtime    Sublocade 300 MG/1.5ML     Brixadi 96 MG/0.27ML SOSY  (Patient not taking: Reported on 9/19/2024)    gabapentin (Neurontin) 300 mg capsule Take 1 capsule (300 mg total) by mouth every 6 (six) hours For post-herpetic neuralgia: Take 1 tablet on day 1,  Then take 2 tablets on day 2, Then take 3 tablets on day 3 and every day after that as instructed by your doctor. (Patient not taking: Reported on 7/3/2024)    ondansetron (ZOFRAN-ODT) 4 mg disintegrating tablet Take 1 tablet (4 mg total) by mouth every 6 (six) hours as needed for nausea or vomiting for up to 6 doses (Patient not taking: Reported on 9/19/2024)     Allergies   Allergen Reactions    Penicillins        There is no immunization history on file for this patient.  Objective   /65 (BP Location: Left arm, Patient Position: Sitting, Cuff Size: Large)   Pulse 86   Temp 98.7 °F (37.1 °C) (Temporal)   Resp 16   Ht 5' 9\" (1.753 m)   Wt 82.3 kg (181 lb 6.4 oz)   SpO2 97%   BMI 26.79 kg/m²     Physical Exam  Vitals and nursing note reviewed.   Constitutional:       General: He is not in acute distress.     Appearance: Normal appearance. He is well-developed. He is not ill-appearing.   HENT:      Head: Normocephalic and atraumatic.      Right Ear: External ear normal.      Left Ear: External ear normal.      Nose: Nose normal.   Eyes:      Conjunctiva/sclera: Conjunctivae normal.   Cardiovascular:      Rate and Rhythm: Normal rate and regular rhythm.      Pulses: Normal pulses.      Heart sounds: Normal heart sounds. No murmur heard.  Pulmonary:      Effort: Pulmonary effort is normal. No respiratory distress.      Breath sounds: Normal breath sounds.   Abdominal:      Palpations: Abdomen is soft.      Tenderness: There is no abdominal tenderness.   Musculoskeletal:         General: No swelling, tenderness or deformity.      Cervical back: Neck supple.      Right lower leg: No edema.      Left lower leg: No edema.   Skin:   "   General: Skin is warm and dry.      Capillary Refill: Capillary refill takes less than 2 seconds.   Neurological:      Mental Status: He is alert.   Psychiatric:         Mood and Affect: Mood normal.

## 2024-09-24 PROBLEM — M79.671 FOOT PAIN, RIGHT: Status: ACTIVE | Noted: 2024-09-24

## 2024-09-24 RX ORDER — QUETIAPINE FUMARATE 25 MG/1
50 TABLET, FILM COATED ORAL
Status: CANCELLED | OUTPATIENT
Start: 2024-09-24

## 2024-09-24 NOTE — ASSESSMENT & PLAN NOTE
- Patient noting generalized right foot pain with swelling for a few weeks  - Was started on steroid taper by [facility] which he reports has helped; not currently in pain  - Notes pain/discomfort described as aching pain in the entire foot that is worse at night and relieved with walking   - Notes walking on the lateral edge of his foot has helped as well  - Physical examination unremarkable for any foot deformity, pes planus, swelling, erythema or joint/bone tenderness to palpation  - Concern for arthritis (atypical presentation) vs plantar fasciitis vs RLS     Plan  - Start Voltaren gel QID PRN foot pain  - Counseled on the use of NSAID's and long term side effects of chronic use   - Consider Podiatry referral     Orders:    Diclofenac Sodium (VOLTAREN) 1 %; Apply 2 g topically 4 (four) times a day    Iron Panel (Includes Ferritin, Iron Sat%, Iron, and TIBC); Future    Vitamin B12; Future

## 2024-09-24 NOTE — TELEPHONE ENCOUNTER
Medication:        Brixadi 96 MG/0.27ML SOSY             Dose/Frequency: 0.27 ML    Quantity: N/A    Pharmacy: [facility]/pharmacy #[id number] [facility] [place] - [address]     Office:   [] PCP/Provider -   [x] Speciality/Provider -     Does the patient have enough for 3 days?   [] Yes   [x] No - Send as HP to POD      Medication:     QUEtiapine (SEROquel) 25 mg tablet       Dose/Frequency: 25 mg tablet/     Take 50 mg by mouth daily at bedtime       Quantity: N/A    Pharmacy: [facility]/pharmacy #[id number] [facility] [place] - [address]     Office:   [] PCP/Provider -   [x] Speciality/Provider -     Does the patient have enough for 3 days?   [] Yes   [x] No - Send as HP to POD

## 2024-09-27 ENCOUNTER — TELEPHONE (OUTPATIENT)
Age: 39
End: 2024-09-27

## 2024-09-27 NOTE — TELEPHONE ENCOUNTER
New Patient    What is the reason for the patient’s appointment?:Weak urinary stream referral attached to appt     What office location does the patient prefer?: [place]    Does patient have Imaging/Lab Results: no     Have patient records been requested?: records in epic   If No, are the records showing in Epic:       HISTORY:   Has the patient had any previous Urologist(s)?: no     Was the patient seen in the ED?: no     Has the patient had any outside testing done?: no

## 2024-10-08 ENCOUNTER — TELEPHONE (OUTPATIENT)
Dept: INTERNAL MEDICINE CLINIC | Facility: CLINIC | Age: 39
End: 2024-10-08

## 2024-10-08 DIAGNOSIS — F41.9 ANXIETY: Primary | ICD-10-CM

## 2024-10-08 RX ORDER — QUETIAPINE FUMARATE 25 MG/1
50 TABLET, FILM COATED ORAL
Qty: 60 TABLET | Refills: 2 | Status: CANCELLED | OUTPATIENT
Start: 2024-10-08

## 2024-10-08 RX ORDER — QUETIAPINE FUMARATE 50 MG/1
50 TABLET, FILM COATED ORAL
Qty: 30 TABLET | Refills: 0 | Status: SHIPPED | OUTPATIENT
Start: 2024-10-08 | End: 2024-11-07

## 2024-10-08 NOTE — TELEPHONE ENCOUNTER
Patient is reaching out to see if PCP could take over prescribing his 50mg Quetiapine. Reports medication was originally prescribed by [place] and then [place] but currently patient is not connected to outpatient psychiatry. He has been out of medication for a couple of days. Please reach out to him with any questions or concerns. If this can be sent in for him, he would like it sent to [facility].

## 2024-11-06 RX ORDER — BUPRENORPHINE 96 MG/.27ML
INJECTION SUBCUTANEOUS
OUTPATIENT
Start: 2024-11-06

## 2024-11-18 DIAGNOSIS — F41.9 ANXIETY: ICD-10-CM

## 2024-11-18 RX ORDER — QUETIAPINE FUMARATE 50 MG/1
50 TABLET, FILM COATED ORAL
Qty: 30 TABLET | Refills: 0 | Status: SHIPPED | OUTPATIENT
Start: 2024-11-18 | End: 2024-12-18

## 2024-11-18 NOTE — TELEPHONE ENCOUNTER
Reason for call:   [x] Refill   [] Prior Auth  [] Other:     Office:   [x] PCP/Provider - Pk Jolly,    [] Specialty/Provider -     Medication: QUEtiapine (SEROquel) 50 mg tablet     Dose/Frequency: Take 1 tablet (50 mg total) by mouth daily at bedtime     Quantity: 30    Pharmacy: [facility]/pharmacy #[id number] - [place], [place] - [address]     Does the patient have enough for 3 days?   [] Yes   [x] No - Send as HP to POD

## 2025-03-08 ENCOUNTER — HOSPITAL ENCOUNTER (INPATIENT)
Facility: HOSPITAL | Age: 40
LOS: 1 days | Discharge: HOME | End: 2025-03-09
Attending: STUDENT IN AN ORGANIZED HEALTH CARE EDUCATION/TRAINING PROGRAM | Admitting: STUDENT IN AN ORGANIZED HEALTH CARE EDUCATION/TRAINING PROGRAM
Payer: COMMERCIAL

## 2025-03-08 ENCOUNTER — HOSPITAL ENCOUNTER (EMERGENCY)
Facility: HOSPITAL | Age: 40
End: 2025-03-08
Attending: EMERGENCY MEDICINE
Payer: COMMERCIAL

## 2025-03-08 VITALS
TEMPERATURE: 97.5 F | HEART RATE: 62 BPM | OXYGEN SATURATION: 95 % | DIASTOLIC BLOOD PRESSURE: 82 MMHG | RESPIRATION RATE: 18 BRPM | SYSTOLIC BLOOD PRESSURE: 138 MMHG

## 2025-03-08 DIAGNOSIS — E87.3 ALKALEMIA: ICD-10-CM

## 2025-03-08 DIAGNOSIS — F11.93 OPIOID WITHDRAWAL (HCC): ICD-10-CM

## 2025-03-08 DIAGNOSIS — F14.10 COCAINE USE DISORDER (HCC): ICD-10-CM

## 2025-03-08 DIAGNOSIS — F13.939 BENZODIAZEPINE WITHDRAWAL WITH COMPLICATION (HCC): Primary | ICD-10-CM

## 2025-03-08 DIAGNOSIS — F11.20 OPIOID USE DISORDER, SEVERE, DEPENDENCE (HCC): ICD-10-CM

## 2025-03-08 DIAGNOSIS — F13.20 SEVERE BENZODIAZEPINE USE DISORDER (HCC): Primary | ICD-10-CM

## 2025-03-08 PROBLEM — N17.9 ACUTE KIDNEY INJURY (HCC): Status: RESOLVED | Noted: 2018-03-12 | Resolved: 2025-03-08

## 2025-03-08 PROBLEM — K92.1 MELENA: Status: RESOLVED | Noted: 2018-03-12 | Resolved: 2025-03-08

## 2025-03-08 PROBLEM — D62 ACUTE BLOOD LOSS ANEMIA: Status: RESOLVED | Noted: 2018-03-12 | Resolved: 2025-03-08

## 2025-03-08 PROBLEM — E87.1 HYPONATREMIA: Status: RESOLVED | Noted: 2018-03-19 | Resolved: 2025-03-08

## 2025-03-08 PROBLEM — F11.90 OPIOID USE DISORDER: Status: ACTIVE | Noted: 2018-03-12

## 2025-03-08 PROBLEM — L02.415: Status: RESOLVED | Noted: 2020-10-05 | Resolved: 2025-03-08

## 2025-03-08 PROBLEM — D72.829 LEUKOCYTOSIS: Status: RESOLVED | Noted: 2024-07-03 | Resolved: 2025-03-08

## 2025-03-08 PROBLEM — M79.671 FOOT PAIN, RIGHT: Status: RESOLVED | Noted: 2024-09-24 | Resolved: 2025-03-08

## 2025-03-08 PROBLEM — F13.10 BENZODIAZEPINE ABUSE (HCC): Status: ACTIVE | Noted: 2025-03-08

## 2025-03-08 PROBLEM — R07.9 CHEST PAIN: Status: RESOLVED | Noted: 2018-03-12 | Resolved: 2025-03-08

## 2025-03-08 PROBLEM — R31.29 MICROSCOPIC HEMATURIA: Status: RESOLVED | Noted: 2022-11-24 | Resolved: 2025-03-08

## 2025-03-08 LAB
ALBUMIN SERPL BCG-MCNC: 4.6 G/DL (ref 3.5–5)
ALP SERPL-CCNC: 64 U/L (ref 34–104)
ALT SERPL W P-5'-P-CCNC: 13 U/L (ref 7–52)
AMPHETAMINES SERPL QL SCN: NEGATIVE
ANION GAP SERPL CALCULATED.3IONS-SCNC: 6 MMOL/L (ref 4–13)
AST SERPL W P-5'-P-CCNC: 18 U/L (ref 13–39)
ATRIAL RATE: 51 BPM
BARBITURATES UR QL: NEGATIVE
BASOPHILS # BLD AUTO: 0.06 THOUSANDS/ÂΜL (ref 0–0.1)
BASOPHILS NFR BLD AUTO: 1 % (ref 0–1)
BENZODIAZ UR QL: NEGATIVE
BILIRUB SERPL-MCNC: 0.4 MG/DL (ref 0.2–1)
BILIRUB UR QL STRIP: NEGATIVE
BUN SERPL-MCNC: 8 MG/DL (ref 5–25)
CALCIUM SERPL-MCNC: 9.5 MG/DL (ref 8.4–10.2)
CHLORIDE SERPL-SCNC: 99 MMOL/L (ref 96–108)
CLARITY UR: CLEAR
CO2 SERPL-SCNC: 33 MMOL/L (ref 21–32)
COCAINE UR QL: POSITIVE
COLOR UR: NORMAL
CREAT SERPL-MCNC: 0.81 MG/DL (ref 0.6–1.3)
EOSINOPHIL # BLD AUTO: 0.21 THOUSAND/ÂΜL (ref 0–0.61)
EOSINOPHIL NFR BLD AUTO: 3 % (ref 0–6)
ERYTHROCYTE [DISTWIDTH] IN BLOOD BY AUTOMATED COUNT: 12.1 % (ref 11.6–15.1)
FENTANYL UR QL SCN: POSITIVE
GFR SERPL CREATININE-BSD FRML MDRD: 112 ML/MIN/1.73SQ M
GLUCOSE SERPL-MCNC: 108 MG/DL (ref 65–140)
GLUCOSE UR STRIP-MCNC: NEGATIVE MG/DL
HCT VFR BLD AUTO: 39.5 % (ref 36.5–49.3)
HGB BLD-MCNC: 12.9 G/DL (ref 12–17)
HGB UR QL STRIP.AUTO: NEGATIVE
HYDROCODONE UR QL SCN: NEGATIVE
IMM GRANULOCYTES # BLD AUTO: 0.03 THOUSAND/UL (ref 0–0.2)
IMM GRANULOCYTES NFR BLD AUTO: 0 % (ref 0–2)
KETONES UR STRIP-MCNC: NEGATIVE MG/DL
LEUKOCYTE ESTERASE UR QL STRIP: NEGATIVE
LYMPHOCYTES # BLD AUTO: 1.53 THOUSANDS/ÂΜL (ref 0.6–4.47)
LYMPHOCYTES NFR BLD AUTO: 19 % (ref 14–44)
MAGNESIUM SERPL-MCNC: 2 MG/DL (ref 1.9–2.7)
MCH RBC QN AUTO: 30.1 PG (ref 26.8–34.3)
MCHC RBC AUTO-ENTMCNC: 32.7 G/DL (ref 31.4–37.4)
MCV RBC AUTO: 92 FL (ref 82–98)
METHADONE UR QL: NEGATIVE
MONOCYTES # BLD AUTO: 0.43 THOUSAND/ÂΜL (ref 0.17–1.22)
MONOCYTES NFR BLD AUTO: 5 % (ref 4–12)
NEUTROPHILS # BLD AUTO: 5.99 THOUSANDS/ÂΜL (ref 1.85–7.62)
NEUTS SEG NFR BLD AUTO: 72 % (ref 43–75)
NITRITE UR QL STRIP: NEGATIVE
NRBC BLD AUTO-RTO: 0 /100 WBCS
OPIATES UR QL SCN: NEGATIVE
OXYCODONE+OXYMORPHONE UR QL SCN: NEGATIVE
P AXIS: 69 DEGREES
PCP UR QL: NEGATIVE
PH UR STRIP.AUTO: 7 [PH]
PLATELET # BLD AUTO: 344 THOUSANDS/UL (ref 149–390)
PMV BLD AUTO: 9.4 FL (ref 8.9–12.7)
POTASSIUM SERPL-SCNC: 3.5 MMOL/L (ref 3.5–5.3)
PR INTERVAL: 140 MS
PROT SERPL-MCNC: 7.3 G/DL (ref 6.4–8.4)
PROT UR STRIP-MCNC: NEGATIVE MG/DL
QRS AXIS: 51 DEGREES
QRSD INTERVAL: 94 MS
QT INTERVAL: 450 MS
QTC INTERVAL: 415 MS
RBC # BLD AUTO: 4.28 MILLION/UL (ref 3.88–5.62)
SODIUM SERPL-SCNC: 138 MMOL/L (ref 135–147)
SP GR UR STRIP.AUTO: 1.01 (ref 1–1.03)
T WAVE AXIS: 7 DEGREES
THC UR QL: NEGATIVE
UROBILINOGEN UR STRIP-ACNC: <2 MG/DL
VENTRICULAR RATE: 51 BPM
WBC # BLD AUTO: 8.25 THOUSAND/UL (ref 4.31–10.16)

## 2025-03-08 PROCEDURE — 36415 COLL VENOUS BLD VENIPUNCTURE: CPT

## 2025-03-08 PROCEDURE — 93010 ELECTROCARDIOGRAM REPORT: CPT | Performed by: INTERNAL MEDICINE

## 2025-03-08 PROCEDURE — 99239 HOSP IP/OBS DSCHRG MGMT >30: CPT

## 2025-03-08 PROCEDURE — 99223 1ST HOSP IP/OBS HIGH 75: CPT | Performed by: STUDENT IN AN ORGANIZED HEALTH CARE EDUCATION/TRAINING PROGRAM

## 2025-03-08 PROCEDURE — 81003 URINALYSIS AUTO W/O SCOPE: CPT

## 2025-03-08 PROCEDURE — 93005 ELECTROCARDIOGRAM TRACING: CPT

## 2025-03-08 PROCEDURE — 80307 DRUG TEST PRSMV CHEM ANLYZR: CPT

## 2025-03-08 PROCEDURE — 99285 EMERGENCY DEPT VISIT HI MDM: CPT | Performed by: EMERGENCY MEDICINE

## 2025-03-08 PROCEDURE — 83735 ASSAY OF MAGNESIUM: CPT

## 2025-03-08 PROCEDURE — 80053 COMPREHEN METABOLIC PANEL: CPT

## 2025-03-08 PROCEDURE — 99284 EMERGENCY DEPT VISIT MOD MDM: CPT

## 2025-03-08 PROCEDURE — 85025 COMPLETE CBC W/AUTO DIFF WBC: CPT

## 2025-03-08 PROCEDURE — 99255 IP/OBS CONSLTJ NEW/EST HI 80: CPT | Performed by: EMERGENCY MEDICINE

## 2025-03-08 RX ORDER — PHENOBARBITAL 64.8 MG/1
64.8 TABLET ORAL ONCE
Status: COMPLETED | OUTPATIENT
Start: 2025-03-08 | End: 2025-03-08

## 2025-03-08 RX ORDER — GABAPENTIN 300 MG/1
300 CAPSULE ORAL EVERY 8 HOURS PRN
Status: CANCELLED | OUTPATIENT
Start: 2025-03-08

## 2025-03-08 RX ORDER — IBUPROFEN 600 MG/1
600 TABLET, FILM COATED ORAL EVERY 6 HOURS PRN
Status: DISCONTINUED | OUTPATIENT
Start: 2025-03-08 | End: 2025-03-09 | Stop reason: HOSPADM

## 2025-03-08 RX ORDER — CLONIDINE HYDROCHLORIDE 0.1 MG/1
0.1 TABLET ORAL EVERY 6 HOURS PRN
Status: CANCELLED | OUTPATIENT
Start: 2025-03-08

## 2025-03-08 RX ORDER — NICOTINE 21 MG/24HR
14 PATCH, TRANSDERMAL 24 HOURS TRANSDERMAL DAILY
Status: DISCONTINUED | OUTPATIENT
Start: 2025-03-08 | End: 2025-03-09 | Stop reason: HOSPADM

## 2025-03-08 RX ORDER — SODIUM CHLORIDE 9 MG/ML
125 INJECTION, SOLUTION INTRAVENOUS CONTINUOUS
Status: CANCELLED | OUTPATIENT
Start: 2025-03-08 | End: 2025-03-08

## 2025-03-08 RX ORDER — CLONIDINE HYDROCHLORIDE 0.1 MG/1
0.1 TABLET ORAL EVERY 6 HOURS PRN
Status: DISCONTINUED | OUTPATIENT
Start: 2025-03-08 | End: 2025-03-09 | Stop reason: HOSPADM

## 2025-03-08 RX ORDER — ACETAMINOPHEN 325 MG/1
650 TABLET ORAL EVERY 6 HOURS PRN
Status: CANCELLED | OUTPATIENT
Start: 2025-03-08

## 2025-03-08 RX ORDER — ENOXAPARIN SODIUM 100 MG/ML
40 INJECTION SUBCUTANEOUS DAILY
Status: DISCONTINUED | OUTPATIENT
Start: 2025-03-09 | End: 2025-03-09 | Stop reason: HOSPADM

## 2025-03-08 RX ORDER — DIAZEPAM 5 MG/1
10 TABLET ORAL ONCE
Status: DISCONTINUED | OUTPATIENT
Start: 2025-03-08 | End: 2025-03-09 | Stop reason: HOSPADM

## 2025-03-08 RX ORDER — NICOTINE 21 MG/24HR
1 PATCH, TRANSDERMAL 24 HOURS TRANSDERMAL DAILY
Status: CANCELLED | OUTPATIENT
Start: 2025-03-08

## 2025-03-08 RX ORDER — ONDANSETRON 2 MG/ML
4 INJECTION INTRAMUSCULAR; INTRAVENOUS EVERY 6 HOURS PRN
Status: DISCONTINUED | OUTPATIENT
Start: 2025-03-08 | End: 2025-03-09 | Stop reason: HOSPADM

## 2025-03-08 RX ORDER — BUPRENORPHINE 20 UG/H
20 PATCH TRANSDERMAL
Status: DISCONTINUED | OUTPATIENT
Start: 2025-03-08 | End: 2025-03-09 | Stop reason: HOSPADM

## 2025-03-08 RX ORDER — TRAZODONE HYDROCHLORIDE 50 MG/1
50 TABLET ORAL
Status: DISCONTINUED | OUTPATIENT
Start: 2025-03-08 | End: 2025-03-09 | Stop reason: HOSPADM

## 2025-03-08 RX ORDER — GABAPENTIN 300 MG/1
300 CAPSULE ORAL EVERY 8 HOURS PRN
Status: DISCONTINUED | OUTPATIENT
Start: 2025-03-08 | End: 2025-03-09 | Stop reason: HOSPADM

## 2025-03-08 RX ORDER — ONDANSETRON 2 MG/ML
4 INJECTION INTRAMUSCULAR; INTRAVENOUS EVERY 6 HOURS PRN
Status: CANCELLED | OUTPATIENT
Start: 2025-03-08

## 2025-03-08 RX ADMIN — GABAPENTIN 300 MG: 300 CAPSULE ORAL at 12:05

## 2025-03-08 RX ADMIN — PHENOBARBITAL 64.8 MG: 64.8 TABLET ORAL at 18:32

## 2025-03-08 RX ADMIN — BUPRENORPHINE 20 MCG: 20 PATCH TRANSDERMAL at 09:42

## 2025-03-08 NOTE — ASSESSMENT & PLAN NOTE
Patient with fentanyl and cocaine use, was clean after Detox admission 7/2024, was on Sublocade injection for 3 months. Per PDMP last received Sublocade Injection in September.   Uses fentanyl and cocaine daily via insufflating   Denies h/o IVDU  Management of opioid withdrawal under MAT protocol as above  Case management/CRS consulted for assistance with aftercare resources - pt expresses interest in sublocade injection will begin suboxone during this admission and transition to Sublocade as an outpatient.

## 2025-03-08 NOTE — ASSESSMENT & PLAN NOTE
Withdrawal management as above    Encourage cessation and seek non-benzodiazepine alternatives to benzodiazepines for anxiolysis

## 2025-03-08 NOTE — H&P
H&P - Hospitalist   Name: Pravin Oliveros 39 y.o. male I MRN: [id number]  Unit/Bed#: 5T DETOX 507-01 I Date of Admission: 3/8/2025   Date of Service: 3/8/2025 I Hospital Day: 0     Assessment & Plan  Benzodiazepine withdrawal with complication (HCC)  Patient with a history of chronic benzodiazepine use   Last use 3/8/25   Toxicology Consulted   Initiate SEWS protocol for medical management of benzodiazepine withdrawal  Current benzo withdrawal signs/symptoms include at the time of admission patient has no objective findings of acute benzodiazepine withdrawal   Continue monitoring under protocol and administer phenobarbital as indicated  Continuous pulse ox and telemetry monitoring  Encourage continued cessation of benzodiazepine use after withdrawal is fully treated   Opioid withdrawal (HCC)  Patient with use of Fentanyl and Cocaine past 3 weeks, was on sublocade injections for 3 months 2024 and relapsed 3 weeks ago  Last use was prior to arrival in ED  Toxicology consulted; appreciate recommendations   Initiate MAT/opioid withdrawal protocol with plan for eventual microinduction with Suboxone  Currently still under the influence as patient is bradycardic with pin-point pupils   Initiate Butrans 20 mcg/hr patch   Continue monitoring opioid withdrawal, and plan for microinduction when >24 hours have passed since pt's last opioid use  Symptomatic and supportive care  Continuous pulse oximetry monitoring   Polysubstance dependence including opioid type drug with complication, continuous use (HCC)  Patient with fentanyl and cocaine use, was clean after Detox admission 7/2024, was on Sublocade injection for 3 months. Per PDMP last received Sublocade Injection in September.   Uses fentanyl and cocaine daily via insufflating   Denies h/o IVDU  Management of opioid withdrawal under MAT protocol as above  Case management/CRS consulted for assistance with aftercare resources - pt expresses interest in sublocade injection will  begin suboxone during this admission and transition to Sublocade as an outpatient.   Moderate benzodiazepine use disorder (HCC)  Patient reports intermittent benzo use for past 3 weeks, last used prior to arrival in ED  UDS negative  Tobacco dependence  Smokes 1/3 ppd  Nicotine patch 14 mg  Smoking cessation recommended  Bradycardia  HR 54  In the setting of recent opioid use   Telemetry monitoring   Obtain EKG       VTE Pharmacologic Prophylaxis:   Low Risk (Score 0-2) - Encourage Ambulation.  Code Status: Level 1 - Full Code   Discussion with family: Patient declined call to .     Anticipated Length of Stay: Patient will be admitted on an inpatient basis with an anticipated length of stay of greater than 2 midnights secondary to benzodiazepine withdrawal, opioid withdrawal .    History of Present Illness   Chief Complaint: detox evaluation    Pravin Oliveros is a 39 y.o. male with a PMH of depression, anxiety, tobacco abuse, opioid and benzo use who presents with detox evaluation. Patient with hx of polysubstance abuse and was admitted to Detox unit 7/2024. He was on sublocade for 3 months. Per PDMP last received Sublocade 100 mg injection in September. Reports he relapsed on benzodiazepines and fentanyl doing  3 weeks ago. Reports insufflating fentanyl and taking ativan. Last use was prior to arrival in ED. Upon admission patient is a poor historian and he is still acutely under the influence of opioids and is unable to participate in answering questions.     Review of Systems   Unable to perform ROS: Other       Historical Information   Past Medical History:   Diagnosis Date    Anxiety     Cutaneous abscess of right knee 10/05/2020    Drug abuse (HCC)     Leukocytosis 12/22/2018    Substance abuse (HCC)      No past surgical history on file.  Social History     Tobacco Use    Smoking status: Every Day     Current packs/day: 0.25     Types: Cigarettes    Smokeless tobacco: Never    Tobacco comments:      occationally   Vaping Use    Vaping status: Never Used   Substance and Sexual Activity    Alcohol use: Not Currently     Comment: social    Drug use: Yes     Types: Methamphetamines, Fentanyl     Comment: reports snorting 6 bags fentanyl daily    Sexual activity: Not on file     E-Cigarette/Vaping    E-Cigarette Use Never User      E-Cigarette/Vaping Substances     Family History   Problem Relation Age of Onset    Diabetes Brother     Heart disease Maternal Grandmother      Social History:  Marital Status: Single   Occupation: Unknown  Patient Pre-hospital Living Situation: Home  Patient Pre-hospital Level of Mobility: walks  Patient Pre-hospital Diet Restrictions: None     Meds/Allergies   I have reviewed home medications with patient personally.  Prior to Admission medications    Medication Sig Start Date End Date Taking? Authorizing Provider   Brixadi 96 MG/0.27ML SOSY  9/12/24   Historical Provider, MD   buprenorphine-naloxone (Suboxone) 8-2 mg TAKE 1 FILM SUBLINGUALLY EVERY DAY 9/26/24   Historical Provider, MD   Diclofenac Sodium (VOLTAREN) 1 % Apply 2 g topically 4 (four) times a day 9/19/24   Pk Jolly,    gabapentin (Neurontin) 300 mg capsule Take 1 capsule (300 mg total) by mouth every 6 (six) hours For post-herpetic neuralgia: Take 1 tablet on day 1,  Then take 2 tablets on day 2, Then take 3 tablets on day 3 and every day after that as instructed by your doctor.  Patient not taking: Reported on 7/3/2024 6/2/24   Fermín Valdivia MD   methylPREDNISolone 4 MG tablet therapy pack TAKE AS DIRECTED PER INSTRUCTIONS ON PACKAGE 8/27/24   Historical Provider, MD   ondansetron (ZOFRAN-ODT) 4 mg disintegrating tablet Take 1 tablet (4 mg total) by mouth every 6 (six) hours as needed for nausea or vomiting for up to 6 doses  Patient not taking: Reported on 9/19/2024 4/2/24   Geovanny Conde DO   prazosin (MINIPRESS) 1 mg capsule TAKE 1 CAPSULE EVERY DAY AT BEDTIME FOR PTSD 8/21/24    Historical Provider, MD   QUEtiapine (SEROquel) 50 mg tablet Take 1 tablet (50 mg total) by mouth daily at bedtime 11/18/24 12/18/24  Pk Jolly DO   Sublocade 100 MG/0.5ML  9/30/24   Historical Provider, MD   Sublocade 300 MG/1.5ML  8/23/24   Historical Provider, MD     Allergies   Allergen Reactions    Penicillins        Objective :  Temp:  [97.3 °F (36.3 °C)-97.5 °F (36.4 °C)] 97.3 °F (36.3 °C)  HR:  [54-72] 54  BP: ()/(51-82) 98/51  Resp:  [18] 18  SpO2:  [94 %-96 %] 94 %  O2 Device: None (Room air)    Physical Exam  Constitutional:       General: He is not in acute distress.     Appearance: He is not diaphoretic.   Eyes:      General: No scleral icterus.  Cardiovascular:      Rate and Rhythm: Regular rhythm. Bradycardia present.      Heart sounds: No murmur heard.  Pulmonary:      Effort: No respiratory distress.      Breath sounds: Normal breath sounds.   Abdominal:      General: There is no distension.      Palpations: Abdomen is soft.      Tenderness: There is no abdominal tenderness.   Skin:     Comments: No piloerection   Neurological:      Mental Status: He is lethargic.   Psychiatric:         Mood and Affect: Mood is not anxious.          Lines/Drains:      Lab Results: I have reviewed the following results:  Results from last 7 days   Lab Units 03/08/25  0300   WBC Thousand/uL 8.25   HEMOGLOBIN g/dL 12.9   HEMATOCRIT % 39.5   PLATELETS Thousands/uL 344   SEGS PCT % 72   LYMPHO PCT % 19   MONO PCT % 5   EOS PCT % 3     Results from last 7 days   Lab Units 03/08/25  0300   SODIUM mmol/L 138   POTASSIUM mmol/L 3.5   CHLORIDE mmol/L 99   CO2 mmol/L 33*   BUN mg/dL 8   CREATININE mg/dL 0.81   ANION GAP mmol/L 6   CALCIUM mg/dL 9.5   ALBUMIN g/dL 4.6   TOTAL BILIRUBIN mg/dL 0.40   ALK PHOS U/L 64   ALT U/L 13   AST U/L 18   GLUCOSE RANDOM mg/dL 108             Lab Results   Component Value Date    HGBA1C 5.3 03/12/2018     Imaging Results Review: No pertinent imaging studies  reviewed.  Other Study Results Review: No additional pertinent studies reviewed.    Administrative Statements       ** Please Note: This note has been constructed using a voice recognition system. **

## 2025-03-08 NOTE — ASSESSMENT & PLAN NOTE
Suspect may be secondary to opioid with xylazine use prior to admission. Continue to monitor. EKG interpretation below

## 2025-03-08 NOTE — CONSULTS
Consultation - Medical Toxicology   Name: Pravin Oliveros 39 y.o. male I MRN: [id number]  Unit/Bed#: 5T DETOX 507-01 I Date of Admission: 3/8/2025   Date of Service: 3/8/2025 I Hospital Day: 0   Inpatient consult to Toxicology  Consult performed by: Lexy Felipe MD  Consult ordered by: Cornelia Chambers PA-C        Physician Requesting Evaluation: Francois Orantes MD   Reason for Evaluation / Principal Problem: Opioid use, benzodiazepine use    Assessment & Plan  Benzodiazepine withdrawal with complication (HCC)  Continue supportive care measures, including airway monitoring, head of bed elevation, aspiration precautions, cardiac telemetry, and continuous pulse oximetry.    Initiate SEWS protocol with symptom-triggered, as needed phenobarbital. Patient has not received any medication thus far as he is likely still intoxicated from substance use prior to arrival.     Continue phenobarbital as indicated with recommended maximum total dose of 2 grams.  Moderate benzodiazepine use disorder (HCC)  Withdrawal management as above    Encourage cessation and seek non-benzodiazepine alternatives to benzodiazepines for anxiolysis  Opioid withdrawal (HCC)  Continue supportive medications as needed for opioid withdrawal symptoms: acetaminophen and ibuprofen/naproxen as needed for pain; loperamide as needed for diarrhea; antiemetics as needed for nausea/vomiting; trazodone and melatonin as needed for sleep; and anxiolytics such as clonidine 0.1-0.2 mg every 8 hours prn, gabapentin 300 mg three times daily prn, hydroxyzine 25-50 mg every 6 hours prn, benzodiazepines as needed for severe symptoms.    Transdermal buprenorphine patch placed upon admission; will continue    Will continue to assess for appropriateness of buprenorphine induction. Anticipate patient will be ready for induction tomorrow.  Opioid use disorder, moderate, dependence (HCC)  Plan for buprenorphine maintenance therapy; patient was previously  maintained on Sublocade.    Recommend CRS consult for recovery support.    Recommend case management consult for disposition planning.  Polysubstance dependence including opioid type drug with complication, continuous use (HCC)  Patient's urine drug screen also positive for cocaine    Recommend continued engagement in cessation counseling.    Recommend CRS consult for recovery support.  Tobacco dependence  Recommend nicotine replacement therapy  Bradycardia  Suspect may be secondary to opioid with xylazine use prior to admission. Continue to monitor. EKG interpretation below    I have discussed the above management plan in detail with the primary service.   Medical Toxicology service will follow.    Please see additional teaching note below (if available):    For further questions, please contact the medical  on call via SecureChat between 8am and 9pm. If between 9pm and 8am, please reach out to the [facility] at [phone].     History of Present Illness   Pravin Oliveros is a 39 y.o. year old male who presents with intoxication with request for medically supervised benzodiazepine and opioid withdrawal management. History obtained via chart review as patient is somnolent and will not answer questions on my evaluation; he falls quickly back to sleep. Per ED and H&P documentation, patient endorses use of fentanyl and lorazepam daily for the last 3 weeks. Unable to quantify amount on my exam. Previously was on Sublocade for 3 months. Unable to obtain information regarding other substance use; UDS positive for fentanyl and cocaine.    Review of Systems   Unable to perform ROS: Mental status change       Historical Information   Medical History Review: I have reviewed the patient's PMH, PSH, Social History, Family History, Meds, and Allergies   Social History     Tobacco Use    Smoking status: Every Day     Current packs/day: 0.25     Types: Cigarettes    Smokeless tobacco: Never    Tobacco comments:      occationally   Vaping Use    Vaping status: Never Used   Substance and Sexual Activity    Alcohol use: Not Currently     Comment: social    Drug use: Yes     Types: Methamphetamines, Fentanyl     Comment: reports snorting 6 bags fentanyl daily    Sexual activity: Not on file     Family History   Problem Relation Age of Onset    Diabetes Brother     Heart disease Maternal Grandmother        Meds/Allergies   Prior to Admission medications    Medication Sig Start Date End Date Taking? Authorizing Provider   Brixadi 96 MG/0.27ML SOSY  9/12/24   Historical Provider, MD   buprenorphine-naloxone (Suboxone) 8-2 mg TAKE 1 FILM SUBLINGUALLY EVERY DAY 9/26/24   Historical Provider, MD   Diclofenac Sodium (VOLTAREN) 1 % Apply 2 g topically 4 (four) times a day 9/19/24   Pk Jolly DO   gabapentin (Neurontin) 300 mg capsule Take 1 capsule (300 mg total) by mouth every 6 (six) hours For post-herpetic neuralgia: Take 1 tablet on day 1,  Then take 2 tablets on day 2, Then take 3 tablets on day 3 and every day after that as instructed by your doctor.  Patient not taking: Reported on 7/3/2024 6/2/24   Fermín Valdivia MD   methylPREDNISolone 4 MG tablet therapy pack TAKE AS DIRECTED PER INSTRUCTIONS ON PACKAGE 8/27/24   Historical Provider, MD   ondansetron (ZOFRAN-ODT) 4 mg disintegrating tablet Take 1 tablet (4 mg total) by mouth every 6 (six) hours as needed for nausea or vomiting for up to 6 doses  Patient not taking: Reported on 9/19/2024 4/2/24   Geovanny Conde DO   prazosin (MINIPRESS) 1 mg capsule TAKE 1 CAPSULE EVERY DAY AT BEDTIME FOR PTSD 8/21/24   Historical Provider, MD   QUEtiapine (SEROquel) 50 mg tablet Take 1 tablet (50 mg total) by mouth daily at bedtime 11/18/24 12/18/24  Pk Jolly DO   Sublocade 100 MG/0.5ML  9/30/24   Historical Provider, MD   Sublocade 300 MG/1.5ML  8/23/24   Historical Provider, MD     Current Facility-Administered Medications:     cloNIDine  (CATAPRES) tablet 0.1 mg, 0.1 mg, Oral, Q6H PRN, Cornelia Chambers PA-C    [START ON 3/9/2025] enoxaparin (LOVENOX) subcutaneous injection 40 mg, 40 mg, Subcutaneous, Daily, Cornelia Chambers PA-C    gabapentin (NEURONTIN) capsule 300 mg, 300 mg, Oral, Q8H PRN, Cornelia Chambers PA-C    ibuprofen (MOTRIN) tablet 600 mg, 600 mg, Oral, Q6H PRN, Cornelia Chambers PA-C    multivitamin-minerals (CENTRUM) tablet 1 tablet, 1 tablet, Oral, Daily, Cornelia Chambers PA-C    nicotine (NICODERM CQ) 14 mg/24hr TD 24 hr patch 14 mg, 14 mg, Transdermal, Daily, RIVKA ArguetaC    ondansetron (ZOFRAN) injection 4 mg, 4 mg, Intravenous, Q6H PRN, Cornelia Chambers PA-C    transdermal buprenorphine (BUTRANS) 20 mcg/hr TD patch 20 mcg, 20 mcg, Transdermal, Q7 Days, Cornelia Chambers PA-C, 20 mcg at 03/08/25 0942    traZODone (DESYREL) tablet 50 mg, 50 mg, Oral, HS PRN, Cornelia Chambers PA-C   Allergies   Allergen Reactions    Penicillins        Objective :  Temp:  [97.3 °F (36.3 °C)-97.6 °F (36.4 °C)] 97.6 °F (36.4 °C)  HR:  [52-72] 52  BP: ()/(51-82) 102/58  Resp:  [18] 18  SpO2:  [94 %-98 %] 98 %  O2 Device: None (Room air)      Intake/Output Summary (Last 24 hours) at 3/8/2025 1125  Last data filed at 3/8/2025 0900  Gross per 24 hour   Intake 0 ml   Output --   Net 0 ml       Physical Exam  Vitals and nursing note reviewed.   Constitutional:       General: He is not in acute distress.     Appearance: He is not diaphoretic.      Comments: Somnolent; will arouse to voice but fall quickly back to sleep   HENT:      Head: Normocephalic and atraumatic.      Nose: No rhinorrhea.   Pulmonary:      Effort: Pulmonary effort is normal. No respiratory distress.   Skin:     General: Skin is warm and dry.      Comments: No piloerection   Neurological:      Comments: somnolent   Psychiatric:         Behavior: Behavior normal.           Lab Results: I have reviewed the following  results:  Results from last 7 days   Lab Units 03/08/25  0300   WBC Thousand/uL 8.25   HEMOGLOBIN g/dL 12.9   HEMATOCRIT % 39.5   PLATELETS Thousands/uL 344   SEGS PCT % 72   LYMPHO PCT % 19   MONO PCT % 5   EOS PCT % 3      Results from last 7 days   Lab Units 03/08/25  0300   POTASSIUM mmol/L 3.5   CHLORIDE mmol/L 99   CO2 mmol/L 33*   BUN mg/dL 8   CREATININE mg/dL 0.81   CALCIUM mg/dL 9.5   ALBUMIN g/dL 4.6   ALK PHOS U/L 64   ALT U/L 13   AST U/L 18   MAGNESIUM mg/dL 2.0          Imaging Results Review: No pertinent imaging studies reviewed.    Other Study Results Review: EKG was personally reviewed and my interpretation is: Personally Reviewed. Sinus Bradycardia. HR 51 bpm, normal axis, no ST or T wave changes , QRS 90, Qtc 414, compared to EKG form 7/3/3024: heart rate and Qtc has improved. Sinus bradycardia..    Administrative Statements   I have spent a total time of 25 minutes in caring for this patient on the day of the visit/encounter including Instructions for management, Counseling / Coordination of care, Documenting in the medical record, Obtaining or reviewing history  , and Communicating with other healthcare professionals .

## 2025-03-08 NOTE — ASSESSMENT & PLAN NOTE
Patient reports intermittent benzo use for past 3 weeks, last used prior to arrival in ED  UDS negative

## 2025-03-08 NOTE — ASSESSMENT & PLAN NOTE
Continue supportive medications as needed for opioid withdrawal symptoms: acetaminophen and ibuprofen/naproxen as needed for pain; loperamide as needed for diarrhea; antiemetics as needed for nausea/vomiting; trazodone and melatonin as needed for sleep; and anxiolytics such as clonidine 0.1-0.2 mg every 8 hours prn, gabapentin 300 mg three times daily prn, hydroxyzine 25-50 mg every 6 hours prn, benzodiazepines as needed for severe symptoms.    Transdermal buprenorphine patch placed upon admission; will continue    Will continue to assess for appropriateness of buprenorphine induction. Anticipate patient will be ready for induction tomorrow.

## 2025-03-08 NOTE — ASSESSMENT & PLAN NOTE
Patient's urine drug screen also positive for cocaine    Recommend continued engagement in cessation counseling.    Recommend CRS consult for recovery support.

## 2025-03-08 NOTE — ASSESSMENT & PLAN NOTE
Patient with use of Fentanyl and Cocaine past 3 weeks, was on sublocade injections for 3 months 2024 and relapsed 3 weeks ago  Last use was prior to arrival in ED  Toxicology consulted; appreciate recommendations   Initiate MAT/opioid withdrawal protocol with plan for eventual microinduction with Suboxone  Currently still under the influence as patient is bradycardic with pin-point pupils   Initiate Butrans 20 mcg/hr patch   Continue monitoring opioid withdrawal, and plan for microinduction when >24 hours have passed since pt's last opioid use  Symptomatic and supportive care  Continuous pulse oximetry monitoring

## 2025-03-08 NOTE — ASSESSMENT & PLAN NOTE
Patient with a history of chronic benzodiazepine use   Last use 3/8/25   Toxicology Consulted   Initiate [facility] protocol for medical management of benzodiazepine withdrawal  Current benzo withdrawal signs/symptoms include at the time of admission patient has no objective findings of acute benzodiazepine withdrawal   Continue monitoring under protocol and administer phenobarbital as indicated  Continuous pulse ox and telemetry monitoring  Encourage continued cessation of benzodiazepine use after withdrawal is fully treated

## 2025-03-08 NOTE — ASSESSMENT & PLAN NOTE
Plan for buprenorphine maintenance therapy; patient was previously maintained on Sublocade.    Recommend CRS consult for recovery support.    Recommend case management consult for disposition planning.

## 2025-03-08 NOTE — ED NOTES
Transfer Information:   Ambulance Squad: [id number]   Pickup Time: 0700   Destination: [place] 5T Detox 507    Accepting Physician: Dr. Orantes   Report Number: [phone]              Home Becerra RN  03/08/25 0551

## 2025-03-08 NOTE — EMTALA/ACUTE CARE TRANSFER
[facility] EMERGENCY DEPARTMENT  [address] [facility] [place] [address]  Dept: [phone]      EMTALA TRANSFER CONSENT    NAME Pravin WHITNEY 1985                              MRN [id number]    I have been informed of my rights regarding examination, treatment, and transfer   by Dr. Lacy Flores MD    Benefits: Specialized equipment and/or services available at the receiving facility (Include comment)________________________ (Detox unit)    Risks: Potential for delay in receiving treatment, Potential deterioration of medical condition, Loss of IV, Increased discomfort during transfer, Possible worsening of condition or death during transfer      Transfer Request   I acknowledge that my medical condition has been evaluated and explained to me by the emergency department physician or other qualified medical person and/or my attending physician who has recommended and offered to me further medical examination and treatment. I understand the Hospital's obligation with respect to the treatment and stabilization of my emergency medical condition. I nevertheless request to be transferred. I release the Hospital, the doctor, and any other persons caring for me from all responsibility or liability for any injury or ill effects that may result from my transfer and agree to accept all responsibility for the consequences of my choice to transfer, rather than receive stabilizing treatment at the Hospital. I understand that because the transfer is my request, my insurance may not provide reimbursement for the services.  The Hospital will assist and direct me and my family in how to make arrangements for transfer, but the hospital is not liable for any fees charged by the transport service.  In spite of this understanding, I refuse to consent to further medical examination and treatment which has been offered to me, and request transfer to Accepting Facility  Name, City & State : [place]. I authorize the performance of emergency medical procedures and treatments upon me in both transit and upon arrival at the receiving facility.  Additionally, I authorize the release of any and all medical records to the receiving facility and request they be transported with me, if possible.    I authorize the performance of emergency medical procedures and treatments upon me in both transit and upon arrival at the receiving facility.  Additionally, I authorize the release of any and all medical records to the receiving facility and request they be transported with me, if possible.  I understand that the safest mode of transportation during a medical emergency is an ambulance and that the Hospital advocates the use of this mode of transport. Risks of traveling to the receiving facility by car, including absence of medical control, life sustaining equipment, such as oxygen, and medical personnel has been explained to me and I fully understand them.    (SHELBY CORRECT BOX BELOW)  [  ]  I consent to the stated transfer and to be transported by ambulance/helicopter.  [  ]  I consent to the stated transfer, but refuse transportation by ambulance and accept full responsibility for my transportation by car.  I understand the risks of non-ambulance transfers and I exonerate the Hospital and its staff from any deterioration in my condition that results from this refusal.    X___________________________________________    DATE  25  TIME________  Signature of patient or legally responsible individual signing on patient behalf           RELATIONSHIP TO PATIENT_________________________          Provider Certification    NAME Pravin Oliveros                                         1985                              MRN [id number]    A medical screening exam was performed on the above named patient.  Based on the examination:    Condition Necessitating Transfer The primary encounter  diagnosis was Severe benzodiazepine use disorder (HCC). Diagnoses of Opioid use disorder, severe, dependence (HCC), Alkalemia, and Cocaine use disorder (HCC) were also pertinent to this visit.    Patient Condition: The patient has been stabilized such that within reasonable medical probability, no material deterioration of the patient condition or the condition of the unborn child(rachel) is likely to result from the transfer    Reason for Transfer: Level of Care needed not available at this facility    Transfer Requirements: Facility [facility]   Space available and qualified personnel available for treatment as acknowledged by    Agreed to accept transfer and to provide appropriate medical treatment as acknowledged by          Appropriate medical records of the examination and treatment of the patient are provided at the time of transfer   STAFF INITIAL WHEN COMPLETED _______  Transfer will be performed by qualified personnel from    and appropriate transfer equipment as required, including the use of necessary and appropriate life support measures.    Provider Certification: I have examined the patient and explained the following risks and benefits of being transferred/refusing transfer to the patient/family:  General risk, such as traffic hazards, adverse weather conditions, rough terrain or turbulence, possible failure of equipment (including vehicle or aircraft), or consequences of actions of persons outside the control of the transport personnel, Unanticipated needs of medical equipment and personnel during transport, Risk of worsening condition, The possibility of a transport vehicle being unavailable      Based on these reasonable risks and benefits to the patient and/or the unborn child(rachel), and based upon the information available at the time of the patient’s examination, I certify that the medical benefits reasonably to be expected from the provision of appropriate medical treatments at another [facility]  facility outweigh the increasing risks, if any, to the individual’s medical condition, and in the case of labor to the unborn child, from effecting the transfer.    X____________________________________________ DATE 03/08/25        TIME_______      ORIGINAL - SEND TO MEDICAL RECORDS   COPY - SEND WITH PATIENT DURING TRANSFER

## 2025-03-08 NOTE — ED PROVIDER NOTES
Time reflects when diagnosis was documented in both MDM as applicable and the Disposition within this note       Time User Action Codes Description Comment    3/8/2025  5:04 AM Lacy Flores [F13.20] Severe benzodiazepine use disorder (HCC)     3/8/2025  5:04 AM Lacy Flores [F11.20] Opioid use disorder, severe, dependence (HCC)     3/8/2025  5:05 AM Lacy Flores [E87.3] Alkalemia     3/8/2025  5:05 AM Lacy Flores [F14.10] Cocaine use disorder (HCC)           ED Disposition       ED Disposition   Transfer to Another Facility-In Network    Condition   --    Date/Time   Sat Mar 8, 2025  5:03 AM    Comment   Pravin Oliveros should be transferred out to  Detox Unit.               Assessment & Plan       Medical Decision Making  DDX: Intoxication, opioid withdrawal, benzodiazepine withdrawal    Pt requesting detox from benzos and opioids. Prior medically assisted therapy and pt is interested in medically assisted therapy again.   Pt appears intoxicated and admits to use just PTA.  Case discussed with Detox unit and pt to transfer to [place] Detox unit.       Amount and/or Complexity of Data Reviewed  Labs: ordered. Decision-making details documented in ED Course.        ED Course as of 03/08/25 0834   Sat Mar 08, 2025   0424 MAGNESIUM: 2.0   0424 COCAINE URINE(!): Positive   0424 FENTANYL URINE(!): Positive   0424 Leukocytes, UA: Negative   0424 Nitrite, UA: Negative   0425 Blood, UA: Negative       Medications - No data to display    ED Risk Strat Scores                 CIWA-Ar Score       Row Name 03/08/25 0214             CIWA-Ar    Blood Pressure 138/82      Pulse 72      Nausea and Vomiting 0      Tactile Disturbances 0      Tremor 0      Auditory Disturbances 0      Paroxysmal Sweats 0      Visual Disturbances 0      Anxiety 0      Headache, Fullness in Head 0      Agitation 0      Orientation and Clouding of Sensorium 0      CIWA-Ar Total 0                                                  History of  Present Illness       Chief Complaint   Patient presents with    Detox Evaluation     Requesting detox evaluation. Last used opioids and barbiturates just prior to arrival. Denies SI/HI/AH/VH.        Past Medical History:   Diagnosis Date    Anxiety     Cutaneous abscess of right knee 10/05/2020    Drug abuse (HCC)     Leukocytosis 12/22/2018    Substance abuse (HCC)       History reviewed. No pertinent surgical history.   Family History   Problem Relation Age of Onset    Diabetes Brother     Heart disease Maternal Grandmother       Social History     Tobacco Use    Smoking status: Every Day     Current packs/day: 0.25     Types: Cigarettes    Smokeless tobacco: Never    Tobacco comments:     occationally   Vaping Use    Vaping status: Never Used   Substance Use Topics    Alcohol use: Not Currently     Comment: social    Drug use: Yes     Types: Methamphetamines, Fentanyl     Comment: reports snorting 6 bags fentanyl daily      E-Cigarette/Vaping    E-Cigarette Use Never User       E-Cigarette/Vaping Substances      I have reviewed and agree with the history as documented.     The patient is a 39 year old male who presents to ED for evaluation for substance abuse. Pt states he has been using opioids and benzos for 3 weeks. He states he previously went through detox for same in late summer of 2024 and was on sublocade injections for 3 months and then went off them but relapsed 3 weeks ago. He states he has been insufflating fentanyl and taking mostly ativan orally. He is interested in medical therapy such as resuming sublocade. He last used just prior to arrival. Denies alcohol use. Denies abdominal pain, nausea, vomiting, SOB, cough, chest pain, recent injury or trauma, SI        Review of Systems   Respiratory:  Negative for shortness of breath.    Cardiovascular:  Negative for chest pain.   Gastrointestinal:  Negative for abdominal pain and vomiting.   Psychiatric/Behavioral:  Positive for behavioral problems.  Negative for self-injury and suicidal ideas.            Objective       ED Triage Vitals [03/08/25 0155]   Temperature Pulse Blood Pressure Respirations SpO2 Patient Position - Orthostatic VS   97.5 °F (36.4 °C) 72 138/82 18 96 % Sitting      Temp Source Heart Rate Source BP Location FiO2 (%) Pain Score    Oral Monitor Right arm -- --      Vitals      Date and Time Temp Pulse SpO2 Resp BP Pain Score FACES Pain Rating User   03/08/25 0330 -- 62 95 % -- -- -- -- MF   03/08/25 0214 -- 72 -- -- 138/82 -- -- JMR   03/08/25 0155 97.5 °F (36.4 °C) 72 96 % 18 138/82 -- -- EM            Physical Exam  Vitals and nursing note reviewed.   Constitutional:       Comments: Pt appears intoxicated and falling asleep easily during exam but aroused easily by voice.    HENT:      Head: Normocephalic and atraumatic.      Nose: Nose normal.   Cardiovascular:      Rate and Rhythm: Normal rate and regular rhythm.      Pulses: Normal pulses.      Heart sounds: Normal heart sounds.   Pulmonary:      Effort: Pulmonary effort is normal. No respiratory distress.      Breath sounds: Normal breath sounds. No wheezing or rales.   Abdominal:      General: Abdomen is flat. Bowel sounds are normal.      Palpations: Abdomen is soft.      Tenderness: There is no abdominal tenderness. There is no guarding or rebound.   Musculoskeletal:         General: No tenderness or deformity. Normal range of motion.      Cervical back: Neck supple.   Skin:     General: Skin is warm and dry.   Neurological:      Mental Status: He is alert, oriented to person, place, and time and easily aroused.         Results Reviewed       Procedure Component Value Units Date/Time    Comprehensive metabolic panel [[id number]]  (Abnormal) Collected: 03/08/25 0300    Lab Status: Final result Specimen: Blood from Arm, Right Updated: 03/08/25 0408     Sodium 138 mmol/L      Potassium 3.5 mmol/L      Chloride 99 mmol/L      CO2 33 mmol/L      ANION GAP 6 mmol/L      BUN 8 mg/dL       Creatinine 0.81 mg/dL      Glucose 108 mg/dL      Calcium 9.5 mg/dL      AST 18 U/L      ALT 13 U/L      Alkaline Phosphatase 64 U/L      Total Protein 7.3 g/dL      Albumin 4.6 g/dL      Total Bilirubin 0.40 mg/dL      eGFR 112 ml/min/1.73sq m     Narrative:      National Kidney Disease Foundation guidelines for Chronic Kidney Disease (CKD):     Stage 1 with normal or high GFR (GFR > 90 mL/min/1.73 square meters)    Stage 2 Mild CKD (GFR = 60-89 mL/min/1.73 square meters)    Stage 3A Moderate CKD (GFR = 45-59 mL/min/1.73 square meters)    Stage 3B Moderate CKD (GFR = 30-44 mL/min/1.73 square meters)    Stage 4 Severe CKD (GFR = 15-29 mL/min/1.73 square meters)    Stage 5 End Stage CKD (GFR <15 mL/min/1.73 square meters)  Note: GFR calculation is accurate only with a steady state creatinine    Magnesium [[id number]]  (Normal) Collected: 03/08/25 0300    Lab Status: Final result Specimen: Blood from Arm, Right Updated: 03/08/25 0408     Magnesium 2.0 mg/dL     Rapid drug screen, urine [[id number]]  (Abnormal) Collected: 03/08/25 0300    Lab Status: Final result Specimen: Urine, Clean Catch Updated: 03/08/25 0407     Amph/Meth UR Negative     Barbiturate Ur Negative     Benzodiazepine Urine Negative     Cocaine Urine Positive     Methadone Urine Negative     Opiate Urine Negative     PCP Ur Negative     THC Urine Negative     Oxycodone Urine Negative     Fentanyl Urine Positive     HYDROCODONE URINE Negative    Narrative:      Presumptive report. If requested, specimen will be sent to reference lab for confirmation.  FOR MEDICAL PURPOSES ONLY.   IF CONFIRMATION NEEDED PLEASE CONTACT THE LAB WITHIN 5 DAYS.    Drug Screen Cutoff Levels:  AMPHETAMINE/METHAMPHETAMINES  1000 ng/mL  BARBITURATES     200 ng/mL  BENZODIAZEPINES     200 ng/mL  COCAINE      300 ng/mL  METHADONE      300 ng/mL  OPIATES      300 ng/mL  PHENCYCLIDINE     25 ng/mL  THC       50 ng/mL  OXYCODONE      100 ng/mL  FENTANYL      5  ng/mL  HYDROCODONE     300 ng/mL    UA w Reflex to Microscopic w Reflex to Culture [[id number]] Collected: 03/08/25 0300    Lab Status: Final result Specimen: Urine, Clean Catch Updated: 03/08/25 0355     Color, UA Light Yellow     Clarity, UA Clear     Specific Gravity, UA 1.014     pH, UA 7.0     Leukocytes, UA Negative     Nitrite, UA Negative     Protein, UA Negative mg/dl      Glucose, UA Negative mg/dl      Ketones, UA Negative mg/dl      Urobilinogen, UA <2.0 mg/dl      Bilirubin, UA Negative     Occult Blood, UA Negative    CBC and differential [[id number]] Collected: 03/08/25 0300    Lab Status: Final result Specimen: Blood from Arm, Right Updated: 03/08/25 0350     WBC 8.25 Thousand/uL      RBC 4.28 Million/uL      Hemoglobin 12.9 g/dL      Hematocrit 39.5 %      MCV 92 fL      MCH 30.1 pg      MCHC 32.7 g/dL      RDW 12.1 %      MPV 9.4 fL      Platelets 344 Thousands/uL      nRBC 0 /100 WBCs      Segmented % 72 %      Immature Grans % 0 %      Lymphocytes % 19 %      Monocytes % 5 %      Eosinophils Relative 3 %      Basophils Relative 1 %      Absolute Neutrophils 5.99 Thousands/µL      Absolute Immature Grans 0.03 Thousand/uL      Absolute Lymphocytes 1.53 Thousands/µL      Absolute Monocytes 0.43 Thousand/µL      Eosinophils Absolute 0.21 Thousand/µL      Basophils Absolute 0.06 Thousands/µL             No orders to display       Procedures    ED Medication and Procedure Management   Prior to Admission Medications   Prescriptions Last Dose Informant Patient Reported? Taking?   Brixadi 96 MG/0.27ML SOSY  Self Yes No   Patient not taking: Reported on 9/19/2024   Diclofenac Sodium (VOLTAREN) 1 %   No No   Sig: Apply 2 g topically 4 (four) times a day   QUEtiapine (SEROquel) 50 mg tablet   No No   Sig: Take 1 tablet (50 mg total) by mouth daily at bedtime   Sublocade 100 MG/0.5ML   Yes No   Sublocade 300 MG/1.5ML  Self Yes No   buprenorphine-naloxone (Suboxone) 8-2 mg   Yes No   Sig: TAKE 1 FILM  SUBLINGUALLY EVERY DAY   gabapentin (Neurontin) 300 mg capsule  Self No No   Sig: Take 1 capsule (300 mg total) by mouth every 6 (six) hours For post-herpetic neuralgia: Take 1 tablet on day 1,  Then take 2 tablets on day 2, Then take 3 tablets on day 3 and every day after that as instructed by your doctor.   Patient not taking: Reported on 7/3/2024   methylPREDNISolone 4 MG tablet therapy pack   Yes No   Sig: TAKE AS DIRECTED PER INSTRUCTIONS ON PACKAGE   ondansetron (ZOFRAN-ODT) 4 mg disintegrating tablet  Self No No   Sig: Take 1 tablet (4 mg total) by mouth every 6 (six) hours as needed for nausea or vomiting for up to 6 doses   Patient not taking: Reported on 9/19/2024   prazosin (MINIPRESS) 1 mg capsule   Yes No   Sig: TAKE 1 CAPSULE EVERY DAY AT BEDTIME FOR PTSD      Facility-Administered Medications: None     Discharge Medication List as of 3/8/2025  7:14 AM        CONTINUE these medications which have NOT CHANGED    Details   Brixadi 96 MG/0.27ML SOSY Historical Med      buprenorphine-naloxone (Suboxone) 8-2 mg TAKE 1 FILM SUBLINGUALLY EVERY DAY, Historical Med      Diclofenac Sodium (VOLTAREN) 1 % Apply 2 g topically 4 (four) times a day, Starting Thu 9/19/2024, Normal      gabapentin (Neurontin) 300 mg capsule Take 1 capsule (300 mg total) by mouth every 6 (six) hours For post-herpetic neuralgia: Take 1 tablet on day 1,  Then take 2 tablets on day 2, Then take 3 tablets on day 3 and every day after that as instructed by your doctor., Starting Sun 6/2/2024, Nor mal      methylPREDNISolone 4 MG tablet therapy pack TAKE AS DIRECTED PER INSTRUCTIONS ON PACKAGE, Historical Med      ondansetron (ZOFRAN-ODT) 4 mg disintegrating tablet Take 1 tablet (4 mg total) by mouth every 6 (six) hours as needed for nausea or vomiting for up to 6 doses, Starting Tue 4/2/2024, Normal      prazosin (MINIPRESS) 1 mg capsule TAKE 1 CAPSULE EVERY DAY AT BEDTIME FOR PTSD, Historical Med      QUEtiapine (SEROquel) 50 mg tablet Take  1 tablet (50 mg total) by mouth daily at bedtime, Starting Mon 11/18/2024, Until Wed 12/18/2024, Normal      Sublocade 100 MG/0.5ML Historical Med      Sublocade 300 MG/1.5ML Historical Med           No discharge procedures on file.  ED SEPSIS DOCUMENTATION   Time reflects when diagnosis was documented in both MDM as applicable and the Disposition within this note       Time User Action Codes Description Comment    3/8/2025  5:04 AM Lacy Flores [F13.20] Severe benzodiazepine use disorder (HCC)     3/8/2025  5:04 AM Lacy Flores [F11.20] Opioid use disorder, severe, dependence (HCC)     3/8/2025  5:05 AM Lacy Flores [E87.3] Alkalemia     3/8/2025  5:05 AM Lacy Flores [F14.10] Cocaine use disorder (HCC)                  Anastasia Gonzales MD  03/08/25 0873

## 2025-03-08 NOTE — ASSESSMENT & PLAN NOTE
Continue supportive care measures, including airway monitoring, head of bed elevation, aspiration precautions, cardiac telemetry, and continuous pulse oximetry.    Initiate SEWS protocol with symptom-triggered, as needed phenobarbital. Patient has not received any medication thus far as he is likely still intoxicated from substance use prior to arrival.     Continue phenobarbital as indicated with recommended maximum total dose of 2 grams.

## 2025-03-09 VITALS
BODY MASS INDEX: 25.92 KG/M2 | WEIGHT: 175 LBS | RESPIRATION RATE: 18 BRPM | OXYGEN SATURATION: 95 % | TEMPERATURE: 98.5 F | HEIGHT: 69 IN | HEART RATE: 58 BPM | DIASTOLIC BLOOD PRESSURE: 82 MMHG | SYSTOLIC BLOOD PRESSURE: 131 MMHG

## 2025-03-09 PROBLEM — R00.1 BRADYCARDIA: Status: RESOLVED | Noted: 2024-07-03 | Resolved: 2025-03-09

## 2025-03-09 LAB
ALBUMIN SERPL BCG-MCNC: 4.2 G/DL (ref 3.5–5)
ALP SERPL-CCNC: 65 U/L (ref 34–104)
ALT SERPL W P-5'-P-CCNC: 13 U/L (ref 7–52)
ANION GAP SERPL CALCULATED.3IONS-SCNC: 7 MMOL/L (ref 4–13)
AST SERPL W P-5'-P-CCNC: 18 U/L (ref 13–39)
BILIRUB SERPL-MCNC: 0.76 MG/DL (ref 0.2–1)
BUN SERPL-MCNC: 10 MG/DL (ref 5–25)
CALCIUM SERPL-MCNC: 9.5 MG/DL (ref 8.4–10.2)
CHLORIDE SERPL-SCNC: 99 MMOL/L (ref 96–108)
CO2 SERPL-SCNC: 30 MMOL/L (ref 21–32)
CREAT SERPL-MCNC: 0.84 MG/DL (ref 0.6–1.3)
ERYTHROCYTE [DISTWIDTH] IN BLOOD BY AUTOMATED COUNT: 12 % (ref 11.6–15.1)
GFR SERPL CREATININE-BSD FRML MDRD: 111 ML/MIN/1.73SQ M
GLUCOSE SERPL-MCNC: 93 MG/DL (ref 65–140)
HCT VFR BLD AUTO: 43.6 % (ref 36.5–49.3)
HGB BLD-MCNC: 14.2 G/DL (ref 12–17)
MAGNESIUM SERPL-MCNC: 2 MG/DL (ref 1.9–2.7)
MCH RBC QN AUTO: 29.6 PG (ref 26.8–34.3)
MCHC RBC AUTO-ENTMCNC: 32.6 G/DL (ref 31.4–37.4)
MCV RBC AUTO: 91 FL (ref 82–98)
PLATELET # BLD AUTO: 346 THOUSANDS/UL (ref 149–390)
PMV BLD AUTO: 9.5 FL (ref 8.9–12.7)
POTASSIUM SERPL-SCNC: 4.1 MMOL/L (ref 3.5–5.3)
PROT SERPL-MCNC: 7 G/DL (ref 6.4–8.4)
RBC # BLD AUTO: 4.8 MILLION/UL (ref 3.88–5.62)
SODIUM SERPL-SCNC: 136 MMOL/L (ref 135–147)
WBC # BLD AUTO: 7.82 THOUSAND/UL (ref 4.31–10.16)

## 2025-03-09 PROCEDURE — 99233 SBSQ HOSP IP/OBS HIGH 50: CPT | Performed by: EMERGENCY MEDICINE

## 2025-03-09 PROCEDURE — 83735 ASSAY OF MAGNESIUM: CPT

## 2025-03-09 PROCEDURE — NC001 PR NO CHARGE

## 2025-03-09 PROCEDURE — 85027 COMPLETE CBC AUTOMATED: CPT

## 2025-03-09 PROCEDURE — 80053 COMPREHEN METABOLIC PANEL: CPT

## 2025-03-09 RX ORDER — BUPRENORPHINE 2 MG/1
0.5 TABLET SUBLINGUAL EVERY 4 HOURS
Status: DISCONTINUED | OUTPATIENT
Start: 2025-03-09 | End: 2025-03-09 | Stop reason: HOSPADM

## 2025-03-09 RX ADMIN — Medication 0.5 MG: at 14:40

## 2025-03-09 RX ADMIN — GABAPENTIN 300 MG: 300 CAPSULE ORAL at 05:52

## 2025-03-09 RX ADMIN — IBUPROFEN 600 MG: 600 TABLET, FILM COATED ORAL at 11:35

## 2025-03-09 RX ADMIN — CLONIDINE HYDROCHLORIDE 0.1 MG: 0.1 TABLET ORAL at 09:56

## 2025-03-09 RX ADMIN — GABAPENTIN 300 MG: 300 CAPSULE ORAL at 15:09

## 2025-03-09 NOTE — UTILIZATION REVIEW
Initial Clinical Review    Pt initially presented to [facility] ED. Pt was transferred by EMS to [facility] for medically managed detox.    Admission: Date/Time/Statement:   Admission Orders (From admission, onward)       Ordered        03/08/25 0811  Inpatient Admission  Once                          Orders Placed This Encounter   Procedures    Inpatient Admission     Standing Status:   Standing     Number of Occurrences:   1     Level of Care:   Med Surg [16]     Estimated length of stay:   More than 2 Midnights     Certification:   I certify that inpatient services are medically necessary for this patient for a duration of greater than two midnights. See H&P and MD Progress Notes for additional information about the patient's course of treatment.     ED Arrival Information       Patient not seen in ED                     ED AT [facility]:  Pt requesting detox from benzos and opioids. Prior medically assisted therapy and pt is interested in medically assisted therapy again.   Pt appears intoxicated and admits to use just PTA.  Case discussed with Detox unit and pt to transfer to [place] Detox unit.     03/08/2025  Initial Presentation: 39 y.o. male who presented to medical detox. Inpatient admission for evaluation and treatment of acute Benzodiazepine / Fentanyl/Cocaine withdrawal. Presented w/ request for detox from Patient with a history of chronic benzodiazepine use.  Last use 3/8/25  and  Patient with use of Fentanyl and Cocaine past 3 weeks, was on sublocade injections for 3 months 2024 and relapsed 3 weeks ago.  Last use was prior to arrival in ED. Uses fentanyl and lorazepam daily for the last 3 weeks, last use 03/08/2025 prior to walking into the ED.  On exam, Lethargic, bradycardia. Under the effects. UDS positive for fentanyl and cocaine. . COWS 11. Plan: COWS monitoring w/ symptomatic supportive care, Butrans patch, IVF, micro induction of Suboxone when clinically indicated,  telemetry, continuous pulse ox, Trend labs, replete electrolytes as needed, I&O. Continue PTA meds  Toxicology consulted.    03/08/2025  Consult Med/Tox:    Benzodiazepine withdrawal with complication (HCC)  Continue supportive care measures, including airway monitoring, head of bed elevation, aspiration precautions, cardiac telemetry, and continuous pulse oximetry.     Initiate SEWS protocol with symptom-triggered, as needed phenobarbital. Patient has not received any medication thus far as he is likely still intoxicated from substance use prior to arrival.      Continue phenobarbital as indicated with recommended maximum total dose of 2 grams.  Moderate benzodiazepine use disorder (HCC)  Withdrawal management as above     Encourage cessation and seek non-benzodiazepine alternatives to benzodiazepines for anxiolysis  Opioid withdrawal (HCC)  Continue supportive medications as needed for opioid withdrawal symptoms: acetaminophen and ibuprofen/naproxen as needed for pain; loperamide as needed for diarrhea; antiemetics as needed for nausea/vomiting; trazodone and melatonin as needed for sleep; and anxiolytics such as clonidine 0.1-0.2 mg every 8 hours prn, gabapentin 300 mg three times daily prn, hydroxyzine 25-50 mg every 6 hours prn, benzodiazepines as needed for severe symptoms.     Transdermal buprenorphine patch placed upon admission; will continue     Will continue to assess for appropriateness of buprenorphine induction. Anticipate patient will be ready for induction tomorrow.  Opioid use disorder, moderate, dependence (HCC)  Plan for buprenorphine maintenance therapy; patient was previously maintained on Sublocade.     Recommend CRS consult for recovery support.     Recommend case management consult for disposition planning.  Polysubstance dependence including opioid type drug with complication, continuous use (HCC)  Patient's urine drug screen also positive for cocaine     Recommend continued engagement in  cessation counseling.     Recommend CRS consult for recovery support.  Tobacco dependence  Recommend nicotine replacement therapy  Bradycardia  Suspect may be secondary to opioid with xylazine use prior to admission. Continue to monitor. EKG interpretation below    Anticipated Length of Stay:   Patient will be admitted on an inpatient basis with an anticipated length of stay of greater than 2 midnights secondary to benzodiazepine withdrawal, opioid withdrawal .       Date: 03/09/2025 Day 2: DC to Home    ED Treatment-Medication Administration - No Administrations Displayed (No Start Event Found)    [facility] ED   None            Scheduled Medications:  buprenorphine, 0.5 mg, Sublingual, Q4H  diazepam, 10 mg, Oral, Once  enoxaparin, 40 mg, Subcutaneous, Daily  multivitamin-minerals, 1 tablet, Oral, Daily  nicotine, 14 mg, Transdermal, Daily  transdermal buprenorphine, 20 mcg, Transdermal, Q7 Days      Continuous IV Infusions:     PRN Meds:  cloNIDine, 0.1 mg, Oral, Q6H PRN  x 1 dose 3/9  gabapentin, 300 mg, Oral, Q8H PRN  x 1 dose 3/8;  x 2 doses 3/9  ibuprofen, 600 mg, Oral, Q6H PRN   x 1 dose 3/9  ondansetron, 4 mg, Intravenous, Q6H PRN  traZODone, 50 mg, Oral, HS PRN      buprenorphine, 0.5 mg, Sublingual, x 1 done 3/9     ED Triage Vitals   Temperature Pulse Respirations Blood Pressure SpO2 Pain Score   03/08/25 0745 03/08/25 0745 03/08/25 0745 03/08/25 0745 03/08/25 0745 03/08/25 0759   (!) 97.3 °F (36.3 °C) (!) 54 18 98/51 94 % No Pain     Weight (last 2 days) before discharge       Date/Time Weight    03/08/25 0807 79.4 (175)    03/08/25 0745 79.4 (175)            Vital Signs (last 3 days) before discharge       Date/Time Temp Pulse Resp BP MAP (mmHg) SpO2 O2 Device Patient Position - Orthostatic VS Flavio Coma Scale Score SEWS Total Score Clinical Opiate Withdrawal Scale Total Score Pain    03/09/25 1504 98.5 °F (36.9 °C) 58 18 131/82 98 95 % None (Room air) Lying -- -- 11 --    03/09/25 1440 -- -- -- --  -- -- -- -- -- -- -- Med Not Given for Pain - for MAR use only    03/09/25 1135 -- -- -- -- -- -- -- -- -- -- -- 7    03/09/25 1122 97.8 °F (36.6 °C) 78 18 107/59 75 96 % None (Room air) Lying -- -- -- --    03/09/25 0900 -- -- -- -- -- -- -- -- -- 0 6 No Pain    03/09/25 0720 98.2 °F (36.8 °C) 70 18 125/66 85 98 % None (Room air) Lying -- -- -- --    03/09/25 0515 97.9 °F (36.6 °C) 75 18 117/80 -- 97 % None (Room air) Lying -- 0 1 No Pain    03/08/25 2200 97.6 °F (36.4 °C) 53 18 -- -- 95 % None (Room air) -- -- -- -- No Pain    03/08/25 2030 -- -- -- -- -- -- -- -- -- 0 -- --    03/08/25 1940 -- -- -- -- -- -- -- -- 15 -- -- No Pain    03/08/25 1930 -- -- -- -- -- -- -- -- -- 0 -- --    03/08/25 1908 97.2 °F (36.2 °C) 65 18 112/56 -- 95 % None (Room air) Lying -- -- 3 No Pain    03/08/25 1829 -- 73 -- 104/53 -- -- -- -- -- 6 4 --    03/08/25 1547 97.3 °F (36.3 °C) 60 18 107/56 -- 96 % None (Room air) Lying -- -- -- --    03/08/25 1500 -- -- -- -- -- -- -- -- -- 0 0 --    03/08/25 1200 -- -- -- -- -- -- -- -- -- 0 3 --    03/08/25 1107 97.6 °F (36.4 °C) 52 18 102/58 72 98 % None (Room air) Lying -- -- -- --    03/08/25 0810 -- -- -- -- -- 95 % None (Room air) -- -- -- -- --    03/08/25 0807 97.3 °F (36.3 °C) 54 18 98/51 -- -- -- -- -- -- -- --    03/08/25 0800 -- -- -- -- -- -- -- -- -- 0 0 --    03/08/25 0759 -- -- -- -- -- -- -- -- -- -- -- No Pain    03/08/25 0745 97.3 °F (36.3 °C) 54 18 98/51 66 94 % None (Room air) Lying -- -- -- --           Clinical Opiate Withdrawal Scale       Row Name 03/08/25 0800 03/08/25 1200 03/08/25 1500 03/08/25 1829 03/08/25 1908    Pulse -- -- -- -- --    Resting Pulse Rate: Measured After Patient is Sitting or Lying for One Minute 0  -LL 0  -LL 0  -LL 0  -LL 0  -NT    GI Upset: Over Last Half Hour 0  -LL 1  -LL 0  -LL 0  -LL 0  -NT    Sweating: Over Past Half Hour Not Accounted for by Room Temperature of Patient Activity 0  -LL 1  -LL 0  -LL 0  -LL 0  -NT    Tremor: Observation of  Outstretched Hands 0  -LL 0  -LL 0  -LL 0  -LL 0  -NT    Restlessness: Observation During Assessment 0  -LL 0  -LL 0  -LL 1  -LL 0  -NT    Yawning: Observation During Assessment 0  -LL 0  -LL 0  -LL 1  -LL 1  -NT    Pupil Size 0  -LL 0  -LL 0  -LL 0  -LL 0  -NT    Anxiety and Irritability 0  -LL 1  -LL 0  -LL 1  -LL 1  -NT    Bone or Joint Aches: If Patient was Having Pain Previously, Only the Additional Component Attributed to Opiate Withdrawal is Scored 0  -LL 0  -LL 0  -LL 0  -LL 0  -NT    Gooseflesh Skin 0  -LL 0  -LL 0  -LL 0  -LL 0  -NT    Runny Nose or Tearing: Not Accounted for by Cold Symptoms or Allergies 0  -LL 0  -LL 0  -LL 1  -LL 1  -NT    Clinical Opiate Withdrawal Scale Total Score 0  -LL 3  -LL 0  -LL 4  -LL 3  -NT    Heart Rate Source -- -- -- -- --    Patient Position - Orthostatic VS -- -- -- -- --      Row Name 03/09/25 0515 03/09/25 0900 03/09/25 1504          Pulse -- -- --      Resting Pulse Rate: Measured After Patient is Sitting or Lying for One Minute 0  -NT 0  -LL 0  -LL      GI Upset: Over Last Half Hour 0  -NT 0  -LL 0  -LL      Sweating: Over Past Half Hour Not Accounted for by Room Temperature of Patient Activity 0  -NT 0  -LL 1  -LL      Tremor: Observation of Outstretched Hands 0  -NT 0  -LL 0  -LL      Restlessness: Observation During Assessment 0  -NT 3  -LL 3  -LL      Yawning: Observation During Assessment 0  -NT 0  -LL 0  -LL      Pupil Size 0  -NT 1  -LL 1  -LL      Anxiety and Irritability 1  -NT 1  -LL 2  -LL      Bone or Joint Aches: If Patient was Having Pain Previously, Only the Additional Component Attributed to Opiate Withdrawal is Scored 0  -NT 0  -LL 0  -LL      Gooseflesh Skin 0  -NT 0  -LL 3  -LL      Runny Nose or Tearing: Not Accounted for by Cold Symptoms or Allergies 0  -NT 1  -LL 1  -LL      Clinical Opiate Withdrawal Scale Total Score 1  -NT 6  -LL 11  -LL      Heart Rate Source -- -- --      Patient Position - Orthostatic VS -- -- --                User Key   (r) = Recorded By, (t) = Taken By, (c) = Cosigned By      Initials Name    LL Samira Castillo, MARION    NT Tamika Goodman RN                     Pertinent Labs/Diagnostic Test Results:   Radiology:  No orders to display     Cardiology:  ECG 12 lead   Final Result by Zafar Irene DO (03/08 1240)   Sinus bradycardia   Otherwise normal ECG   When compared with ECG of 03-Jul-2024 10:28,   No significant change was found   Confirmed by Zafar Irene ([id number]) on 3/8/2025 12:40:05 PM        GI:  No orders to display     Results from last 7 days   Lab Units 03/09/25  0528 03/08/25  0300   WBC Thousand/uL 7.82 8.25   HEMOGLOBIN g/dL 14.2 12.9   HEMATOCRIT % 43.6 39.5   PLATELETS Thousands/uL 346 344   TOTAL NEUT ABS Thousands/µL  --  5.99     Results from last 7 days   Lab Units 03/09/25  0528 03/08/25  0300   SODIUM mmol/L 136 138   POTASSIUM mmol/L 4.1 3.5   CHLORIDE mmol/L 99 99   CO2 mmol/L 30 33*   ANION GAP mmol/L 7 6   BUN mg/dL 10 8   CREATININE mg/dL 0.84 0.81   EGFR ml/min/1.73sq m 111 112   CALCIUM mg/dL 9.5 9.5   MAGNESIUM mg/dL 2.0 2.0     Results from last 7 days   Lab Units 03/09/25  0528 03/08/25  0300   AST U/L 18 18   ALT U/L 13 13   ALK PHOS U/L 65 64   TOTAL PROTEIN g/dL 7.0 7.3   ALBUMIN g/dL 4.2 4.6   TOTAL BILIRUBIN mg/dL 0.76 0.40     Results from last 7 days   Lab Units 03/09/25  0528 03/08/25  0300   GLUCOSE RANDOM mg/dL 93 108       Results from last 7 days   Lab Units 03/08/25  0300   CLARITY UA  Clear   COLOR UA  Light Yellow   SPEC GRAV UA  1.014   PH UA  7.0   GLUCOSE UA mg/dl Negative   KETONES UA mg/dl Negative   BLOOD UA  Negative   PROTEIN UA mg/dl Negative   NITRITE UA  Negative   BILIRUBIN UA  Negative   UROBILINOGEN UA (BE) mg/dl <2.0   LEUKOCYTES UA  Negative     Results from last 7 days   Lab Units 03/08/25  0300   AMPH/METH  Negative   BARBITURATE UR  Negative   BENZODIAZEPINE UR  Negative   COCAINE UR  Positive*   METHADONE URINE  Negative   OPIATE UR  Negative   PCP UR  Negative   THC UR   Negative     Past Medical History:   Diagnosis Date    Anxiety     Cutaneous abscess of right knee 10/05/2020    Drug abuse (HCC)     Leukocytosis 12/22/2018    Substance abuse (HCC)      Present on Admission:   Tobacco dependence   Moderate benzodiazepine use disorder (HCC)   Polysubstance dependence including opioid type drug with complication, continuous use (HCC)   Opioid withdrawal (HCC)   Benzodiazepine withdrawal with complication (HCC)   (Resolved) Bradycardia   Opioid use disorder, moderate, dependence (HCC)      Admitting Diagnosis: Drug use  Age/Sex: 39 y.o. male              Network Utilization Review Department  ATTENTION: Please call with any questions or concerns to [phone] and carefully listen to the prompts so that you are directed to the right person. All voicemails are confidential.   For Discharge needs, contact Care Management DC Support Team at [phone] opt. [phone]  Send all requests for admission clinical reviews, approved or denied determinations and any other requests to dedicated fax number below belonging to the [place] where the patient is receiving treatment. List of dedicated fax numbers for the Facilities:  FACILITY NAME UR FAX NUMBER   ADMISSION DENIALS (Administrative/Medical Necessity) [phone]   DISCHARGE SUPPORT TEAM (NETWORK) [phone]   PARENT CHILD HEALTH (Maternity/NICU/Pediatrics) [phone]   [facility] [phone]   [facility] [phone]   [facility] [phone]   [facility] [phone]   [facility] [phone]   [facility] [phone]   [facility] [phone]   [facility] [phone]   [facility] [phone]   [facility] [phone]   [facility]  [phone]   [facility] [phone]

## 2025-03-09 NOTE — ASSESSMENT & PLAN NOTE
Patient with a history of chronic benzodiazepine use   Last use 3/8/25   Toxicology Consulted   Discontinue [facility] protocol for medical management of benzodiazepine withdrawal  Received 65 mg of phenobarbital

## 2025-03-09 NOTE — ASSESSMENT & PLAN NOTE
Continue supportive care measures, including airway monitoring, head of bed elevation, aspiration precautions, cardiac telemetry, and continuous pulse oximetry.    Last received 65 mg phenobarbital on 03/08 evening  Patient has not received any more phenobarbital further, and now states that his benzodiazepine use was only intermittent.  Last used 03/07  Will discontinue SEWS and monitor off protocol at this point

## 2025-03-09 NOTE — ASSESSMENT & PLAN NOTE
"Patient with use of Fentanyl and Cocaine past 3 weeks, was on sublocade injections for 3 months 2024 and relapsed 3 weeks ago  Last use was prior to arrival in ED  Toxicology consulted; appreciate recommendations   Patient requested to start micro-induction of buprenorphine. He tolerated 0.5 mg of subutex and then requested discharge. Stated that \" I don't want to be here any more, I want rehab instead\"   Patient did not complete micro-induction   "

## 2025-03-09 NOTE — PROGRESS NOTES
Progress Note - Hospitalist   Name: Pravin Oliveros 39 y.o. male I MRN: [id number]  Unit/Bed#: 5T [facility] 507-01 I Date of Admission: 3/8/2025   Date of Service: 3/9/2025 I Hospital Day: 1    Assessment & Plan  Benzodiazepine withdrawal with complication (HCC)  Patient with a history of chronic benzodiazepine use   Last use 3/8/25   Toxicology Consulted   Discontinue SEWS protocol for medical management of benzodiazepine withdrawal  Received 65 mg of phenobarbital     Opioid withdrawal (HCC)  Patient with use of Fentanyl and Cocaine past 3 weeks, was on sublocade injections for 3 months 2024 and relapsed 3 weeks ago  Last use was prior to arrival in ED  Toxicology consulted; appreciate recommendations   Initiate MAT/opioid withdrawal protocol with plan for eventual microinduction with Suboxone  Currently still under the influence as patient is bradycardic with pin-point pupils   Initiate Butrans 20 mcg/hr patch   Continue monitoring opioid withdrawal, and plan for microinduction this evening vs tomorrow  Symptomatic and supportive care  Continuous pulse oximetry monitoring   Polysubstance dependence including opioid type drug with complication, continuous use (HCC)  Patient with fentanyl and cocaine use, was clean after Detox admission 7/2024, was on Sublocade injection for 3 months. Per PDMP last received Sublocade Injection in September.   Uses fentanyl and cocaine daily via insufflating   Denies h/o IVDU  Management of opioid withdrawal under MAT protocol as above  Case management/CRS consulted for assistance with aftercare resources - pt expresses interest in sublocade injection will begin suboxone during this admission and transition to Sublocade as an outpatient.   Moderate benzodiazepine use disorder (HCC)  Patient reports intermittent benzo use for past 3 weeks, last used prior to arrival in ED  UDS negative  Tobacco dependence  Smokes 1/3 ppd  Nicotine patch 14 mg  Smoking cessation recommended  Bradycardia  (Resolved: 3/9/2025)  HR 54  In the setting of recent opioid use   Telemetry monitoring   Obtain EKG     VTE Pharmacologic Prophylaxis:   Low Risk (Score 0-2) - Encourage Ambulation.    Mobility:   Basic Mobility Inpatient Raw Score: 24  JH-HLM Goal: 8: Walk 250 feet or more  JH-HLM Achieved: 7: Walk 25 feet or more (walks to bathroom)  JH-HLM Goal achieved. Continue to encourage appropriate mobility.    Patient Centered Rounds: I performed bedside rounds with nursing staff today.   Discussions with Specialists or Other Care Team Provider: Case Management , Toxicology    Education and Discussions with Family / Patient: Patient declined call to .     Current Length of Stay: 1 day(s)  Current Patient Status: Inpatient   Certification Statement: The patient will continue to require additional inpatient hospital stay due to pending micro-induction of buprenorphine  Discharge Plan: Anticipate discharge in 24-48 hrs to home.    Code Status: Level 1 - Full Code    Subjective   Patient seen and examined at bedside, endorsing withdrawal symptoms of myalgias, anxiety. He is declining starting subutex this AM as he has a history of precipitated withdrawal.     Objective :  Temp:  [97.2 °F (36.2 °C)-98.2 °F (36.8 °C)] 98.2 °F (36.8 °C)  HR:  [52-75] 70  BP: (102-125)/(53-80) 125/66  Resp:  [18] 18  SpO2:  [95 %-98 %] 98 %  O2 Device: None (Room air)    Body mass index is 25.84 kg/m².     Input and Output Summary (last 24 hours):     Intake/Output Summary (Last 24 hours) at 3/9/2025 1048  Last data filed at 3/8/2025 1949  Gross per 24 hour   Intake 365 ml   Output --   Net 365 ml       Physical Exam  Vitals reviewed.   Constitutional:       General: He is not in acute distress.     Appearance: He is diaphoretic.   Cardiovascular:      Rate and Rhythm: Normal rate and regular rhythm.      Heart sounds: No murmur heard.  Pulmonary:      Breath sounds: Normal breath sounds.   Abdominal:      General: Bowel sounds are  normal. There is no distension.      Palpations: Abdomen is soft.      Tenderness: There is no abdominal tenderness.   Neurological:      Mental Status: He is alert and oriented to person, place, and time.   Psychiatric:         Mood and Affect: Mood is anxious.           Lines/Drains:              Lab Results: I have reviewed the following results:   Results from last 7 days   Lab Units 03/09/25  0528 03/08/25  0300   WBC Thousand/uL 7.82 8.25   HEMOGLOBIN g/dL 14.2 12.9   HEMATOCRIT % 43.6 39.5   PLATELETS Thousands/uL 346 344   SEGS PCT %  --  72   LYMPHO PCT %  --  19   MONO PCT %  --  5   EOS PCT %  --  3     Results from last 7 days   Lab Units 03/09/25  0528   SODIUM mmol/L 136   POTASSIUM mmol/L 4.1   CHLORIDE mmol/L 99   CO2 mmol/L 30   BUN mg/dL 10   CREATININE mg/dL 0.84   ANION GAP mmol/L 7   CALCIUM mg/dL 9.5   ALBUMIN g/dL 4.2   TOTAL BILIRUBIN mg/dL 0.76   ALK PHOS U/L 65   ALT U/L 13   AST U/L 18   GLUCOSE RANDOM mg/dL 93                       Recent Cultures (last 7 days):         Imaging Results Review: No pertinent imaging studies reviewed.  Other Study Results Review: No additional pertinent studies reviewed.    Last 24 Hours Medication List:     Current Facility-Administered Medications:     cloNIDine (CATAPRES) tablet 0.1 mg, Q6H PRN    diazepam (VALIUM) tablet 10 mg, Once    enoxaparin (LOVENOX) subcutaneous injection 40 mg, Daily    gabapentin (NEURONTIN) capsule 300 mg, Q8H PRN    ibuprofen (MOTRIN) tablet 600 mg, Q6H PRN    multivitamin-minerals (CENTRUM) tablet 1 tablet, Daily    nicotine (NICODERM CQ) 14 mg/24hr TD 24 hr patch 14 mg, Daily    ondansetron (ZOFRAN) injection 4 mg, Q6H PRN    transdermal buprenorphine (BUTRANS) 20 mcg/hr TD patch 20 mcg, Q7 Days    traZODone (DESYREL) tablet 50 mg, HS PRN    Administrative Statements   Today, Patient Was Seen By: Cornelia Chambers PA-C      **Please Note: This note may have been constructed using a voice recognition system.**

## 2025-03-09 NOTE — ASSESSMENT & PLAN NOTE
Patient with use of Fentanyl and Cocaine past 3 weeks, was on sublocade injections for 3 months 2024 and relapsed 3 weeks ago  Last use was prior to arrival in ED  Toxicology consulted; appreciate recommendations   Initiate MAT/opioid withdrawal protocol with plan for eventual microinduction with Suboxone  Currently still under the influence as patient is bradycardic with pin-point pupils   Initiate Butrans 20 mcg/hr patch   Continue monitoring opioid withdrawal, and plan for microinduction this evening vs tomorrow  Symptomatic and supportive care  Continuous pulse oximetry monitoring

## 2025-03-09 NOTE — ASSESSMENT & PLAN NOTE
Patient with a history of chronic benzodiazepine use   Last use 3/8/25   Toxicology Consulted   Discontinue [facility] protocol for medical management of benzodiazepine withdrawal  Received 65 mg of phenobarbital   No further evidence of benzodiazepine withdrawal

## 2025-03-09 NOTE — DISCHARGE SUMMARY
"Discharge Summary - Hospitalist   Name: Pravin Oliveros 39 y.o. male I MRN: [id number]  Unit/Bed#: 5T [facility] 507-01 I Date of Admission: 3/8/2025   Date of Service: 3/9/2025 I Hospital Day: 1     Assessment & Plan  Benzodiazepine withdrawal with complication (HCC)  Patient with a history of chronic benzodiazepine use   Last use 3/8/25   Toxicology Consulted   Discontinue [facility] protocol for medical management of benzodiazepine withdrawal  Received 65 mg of phenobarbital   No further evidence of benzodiazepine withdrawal     Opioid withdrawal (HCC)  Patient with use of Fentanyl and Cocaine past 3 weeks, was on sublocade injections for 3 months 2024 and relapsed 3 weeks ago  Last use was prior to arrival in ED  Toxicology consulted; appreciate recommendations   Patient requested to start micro-induction of buprenorphine. He tolerated 0.5 mg of subutex and then requested discharge. Stated that \" I don't want to be here any more, I want rehab instead\"   Patient did not complete micro-induction   Polysubstance dependence including opioid type drug with complication, continuous use (HCC)  Patient with fentanyl and cocaine use, was clean after Detox admission 7/2024, was on Sublocade injection for 3 months. Per PDMP last received Sublocade Injection in September.   Uses fentanyl and cocaine daily via insufflating   Denies h/o IVDU  Management of opioid withdrawal under MAT protocol as above  Case management/CRS consulted for assistance with aftercare resources - pt expresses interest in sublocade injection will begin suboxone during this admission and transition to Sublocade as an outpatient.   Moderate benzodiazepine use disorder (HCC)  Patient reports intermittent benzo use for past 3 weeks, last used prior to arrival in ED  UDS negative  Tobacco dependence  Smokes 1/3 ppd  Nicotine patch 14 mg  Smoking cessation recommended  Opioid use disorder, moderate, dependence (HCC)       Medical Problems       Resolved Problems  " Date Reviewed: 3/8/2025          Resolved    Bradycardia 3/9/2025     Resolved by  Cornelia Chambers PA-C        Discharging Physician / Practitioner: Cornelia Chambers PA-C  PCP: Pk Jolly DO  Admission Date:   Admission Orders (From admission, onward)       Ordered        03/08/25 0811  Inpatient Admission  Once                          Discharge Date: 03/09/25    Consultations During Hospital Stay:  Toxicology     Procedures Performed:   None    Significant Findings / Test Results:   none    Incidental Findings:   none       Test Results Pending at Discharge (will require follow up):   none     Outpatient Tests Requested:  none    Complications:  none    Reason for Admission: benzodiazepine withdrawal, opioid withdrawal    Hospital Course:   Pravin Oliveros is a 39 y.o. male patient who originally presented to the hospital on 3/8/2025 due to benzodiazepine and opioid withdrawal. Patient initially presented to the [facility] ED requesting detoxification from benzos and opioids. Patient was admitted to the [facility] medical detox unit for medically assisted withdrawal management, and received a total of 65 mg phenobarbital under [facility] protocol for benzodiazepine w/d without any complications. Patient's benzodiazepine withdrawal symptoms subsequently resolved, and he has remained without objective evidence of further benzodiazepine withdrawal at this time. On admission, he was also placed on Butrans 20 mcg/hr patch to slowly introduce Suboxone and prevent precipitated withdrawal. On 3/9/24, patient requested to start microinduction. He received 0.5 mg of subutex, tolerated well. He then requested discharge stating he is not interested in acute detox and wants to leave so he can be admitted to a inpatient drug and alcohol rehab facility.      Please see above list of diagnoses and related plan for additional information.     Condition at Discharge: stable    Discharge Day Visit / Exam:  "  Subjective:  Please refer to progress note   Vitals: Blood Pressure: 131/82 (03/09/25 1504)  Pulse: 58 (03/09/25 1504)  Temperature: 98.5 °F (36.9 °C) (03/09/25 1504)  Temp Source: Temporal (03/09/25 1504)  Respirations: 18 (03/09/25 1504)  Height: 5' 9\" (175.3 cm) (03/08/25 0807)  Weight - Scale: 79.4 kg (175 lb) (03/08/25 0807)  SpO2: 95 % (03/09/25 1504)    Discussion with Family: Patient declined call to .     Discharge instructions/Information to patient and family:   See after visit summary for information provided to patient and family.      Provisions for Follow-Up Care:  See after visit summary for information related to follow-up care and any pertinent home health orders.      Mobility at time of Discharge:   Basic Mobility Inpatient Raw Score: 24  JH-HLM Goal: 8: Walk 250 feet or more  JH-HLM Achieved: 7: Walk 25 feet or more (walks to bathroom)  HLM Goal achieved. Continue to encourage appropriate mobility.     Disposition:   Home    Planned Readmission:     Discharge Medications:  See after visit summary for reconciled discharge medications provided to patient and/or family.      Administrative Statements   Discharge Statement:  I have spent a total time of 35 minutes in caring for this patient on the day of the visit/encounter. .    **Please Note: This note may have been constructed using a voice recognition system**  "

## 2025-03-09 NOTE — PROGRESS NOTES
Progress Note - Medical Toxicology   Name: Pravin Oliveros 39 y.o. male I MRN: [id number]  Unit/Bed#: 5T [facility] 507-01 I Date of Admission: 3/8/2025   Date of Service: 3/9/2025 I Hospital Day: 1    Assessment & Plan  Benzodiazepine withdrawal with complication (HCC)  Continue supportive care measures, including airway monitoring, head of bed elevation, aspiration precautions, cardiac telemetry, and continuous pulse oximetry.    Last received 65 mg phenobarbital on 03/08 evening  Patient has not received any more phenobarbital further, and now states that his benzodiazepine use was only intermittent.  Last used 03/07  Will discontinue SEWS and monitor off protocol at this point  Moderate benzodiazepine use disorder (HCC)  Withdrawal management as above    Encourage cessation and seek non-benzodiazepine alternatives to benzodiazepines for anxiolysis  Opioid withdrawal (HCC)  Continue supportive medications as needed for opioid withdrawal symptoms: acetaminophen and ibuprofen/naproxen as needed for pain; loperamide as needed for diarrhea; antiemetics as needed for nausea/vomiting; trazodone and melatonin as needed for sleep; and anxiolytics such as clonidine 0.1-0.2 mg every 8 hours prn, gabapentin 300 mg three times daily prn, hydroxyzine 25-50 mg every 6 hours prn, benzodiazepines as needed for severe symptoms.    Transdermal buprenorphine patch placed upon admission; will continue    Will continue to assess for appropriateness of buprenorphine induction. Anticipate patient will be ready for induction tomorrow -- patient reports history of precipitated withdrawal and COWS < 8.  Opioid use disorder, moderate, dependence (HCC)  Plan for buprenorphine maintenance therapy; patient was previously maintained on Sublocade.    Recommend CRS consult for recovery support.    Recommend case management consult for disposition planning.  Polysubstance dependence including opioid type drug with complication, continuous use  (HCC)  Patient's urine drug screen also positive for cocaine    Recommend continued engagement in cessation counseling.    Recommend CRS consult for recovery support.  Tobacco dependence  Recommend nicotine replacement therapy  Bradycardia  Suspect may be secondary to opioid with xylazine use prior to admission. Continue to monitor. EKG interpretation below    Reason for current consult: benzo use disorder, opioid use disorder     Subjective   Restlessness, cold sweats, poor appetite, weakness, fatigue, goosebumps  Denies chest pain, shortness of breath, palpations, nausea, vomiting, abdominal pain, changes in stool, hallucinations      Objective :  Temp:  [97.2 °F (36.2 °C)-98.2 °F (36.8 °C)] 98.2 °F (36.8 °C)  HR:  [52-75] 70  BP: (102-125)/(53-80) 125/66  Resp:  [18] 18  SpO2:  [95 %-98 %] 98 %  O2 Device: None (Room air)      Intake/Output Summary (Last 24 hours) at 3/9/2025 1008  Last data filed at 3/8/2025 1949  Gross per 24 hour   Intake 365 ml   Output --   Net 365 ml       Physical Exam  Vitals and nursing note reviewed.   Constitutional:       General: He is not in acute distress.     Appearance: He is well-developed. He is ill-appearing (tired, clammy, withdrawn). He is not toxic-appearing or diaphoretic.   HENT:      Head: Normocephalic and atraumatic.      Right Ear: External ear normal.      Left Ear: External ear normal.      Nose: Nose normal.      Mouth/Throat:      Mouth: Mucous membranes are moist.   Eyes:      General:         Right eye: No discharge.         Left eye: No discharge.      Extraocular Movements: Extraocular movements intact.      Conjunctiva/sclera: Conjunctivae normal.      Pupils: Pupils are equal, round, and reactive to light.   Cardiovascular:      Rate and Rhythm: Normal rate and regular rhythm.      Heart sounds: Normal heart sounds.      No friction rub. No gallop.   Pulmonary:      Effort: Pulmonary effort is normal. No respiratory distress.      Breath sounds: Normal  breath sounds. No stridor. No wheezing or rales.   Chest:      Chest wall: No tenderness.   Abdominal:      General: Bowel sounds are normal.      Palpations: Abdomen is soft.      Tenderness: There is no abdominal tenderness. There is no guarding.   Musculoskeletal:         General: No tenderness or deformity. Normal range of motion.      Cervical back: Normal range of motion and neck supple.   Skin:     General: Skin is warm.      Capillary Refill: Capillary refill takes less than 2 seconds.      Comments: sweaty   Neurological:      Mental Status: He is alert and oriented to person, place, and time.      GCS: GCS eye subscore is 4. GCS verbal subscore is 5. GCS motor subscore is 6.      Motor: No weakness or tremor.   Psychiatric:         Mood and Affect: Mood is depressed.         Behavior: Behavior is withdrawn.           Lab Results: I have reviewed the following results:CBC/BMP:   .     03/09/25  0528   WBC 7.82   HGB 14.2   HCT 43.6      SODIUM 136   K 4.1   CL 99   CO2 30   BUN 10   CREATININE 0.84   GLUC 93   MG 2.0    , Creatinine Clearance: Estimated Creatinine Clearance: 119.2 mL/min (by C-G formula based on SCr of 0.84 mg/dL)., LFTs:   .     03/09/25  0528   AST 18   ALT 13   ALB 4.2   TBILI 0.76   ALKPHOS 65        Imaging Results Review: No pertinent imaging studies reviewed.  Other Study Results Review: EKG was personally reviewed and my interpretation is: Sinus Bradycardia. HR 51, QRS 94, Qtc 415..    Administrative Statements

## 2025-03-09 NOTE — ASSESSMENT & PLAN NOTE
Continue supportive medications as needed for opioid withdrawal symptoms: acetaminophen and ibuprofen/naproxen as needed for pain; loperamide as needed for diarrhea; antiemetics as needed for nausea/vomiting; trazodone and melatonin as needed for sleep; and anxiolytics such as clonidine 0.1-0.2 mg every 8 hours prn, gabapentin 300 mg three times daily prn, hydroxyzine 25-50 mg every 6 hours prn, benzodiazepines as needed for severe symptoms.    Transdermal buprenorphine patch placed upon admission; will continue    Will continue to assess for appropriateness of buprenorphine induction. Anticipate patient will be ready for induction tomorrow -- patient reports history of precipitated withdrawal and COWS < 8.

## 2025-03-10 ENCOUNTER — PATIENT OUTREACH (OUTPATIENT)
Dept: CASE MANAGEMENT | Facility: OTHER | Age: 40
End: 2025-03-10

## 2025-03-10 LAB
ATRIAL RATE: 65 BPM
P AXIS: 60 DEGREES
PR INTERVAL: 142 MS
QRS AXIS: 60 DEGREES
QRSD INTERVAL: 88 MS
QT INTERVAL: 406 MS
QTC INTERVAL: 422 MS
T WAVE AXIS: -7 DEGREES
VENTRICULAR RATE: 65 BPM

## 2025-03-10 PROCEDURE — 93010 ELECTROCARDIOGRAM REPORT: CPT | Performed by: INTERNAL MEDICINE

## 2025-03-10 NOTE — PROGRESS NOTES
Outpatient Care Management Note    HRR referral received.  Chart review completed at this time.  Patient was seen in the ED at [facility] on 3/8/25 for severe benzodiazepine use disorder and was requesting medically assisted therapy.  Patient was transferred to [facility] where he was admitted from 3/8/25-3/9/25 for medically assisted withdrawal management.  Withdrawal symptoms subsequently resolved. Per chart notes, patient was not interested in acute detox and wanted to leave so that he can be admitted to an inpatient drug and alcohol rehab facility.    No patient outreach call completed during this encounter.  RN CM will schedule patient outreach to follow up and to assess for any care management needs.

## 2025-03-10 NOTE — UTILIZATION REVIEW
NOTIFICATION OF INPATIENT MEDICAL ADMISSION   AUTHORIZATION REQUEST   SERVICING FACILITY:   [address] [address]  Tax ID: [id number]  NPI: [id number] ATTENDING PROVIDER:  Attending Name and NPI#: Francois Orantes Md [[id number]]  Address: [address] [place] [address]  Phone: [phone]     ADMISSION INFORMATION:  Place of Service: Inpatient [facility]  Place of Service Code: 21  Inpatient Admission Date/Time: 3/8/25  8:11 AM  Discharge Date/Time: 3/9/2025  4:59 PM  Admitting Diagnosis Code/Description:  Drug use     UTILIZATION REVIEW CONTACT:  Ida Law, Utilization   Network Utilization Review Department  Phone: [phone]  Fax [phone]  Email: [email or web address]  Contact for approvals/pending authorizations, clinical reviews, and discharge.     PHYSICIAN ADVISORY SERVICES:  Medical Necessity Denial & Crux-pf-Cfcc Review  Phone: [phone]  Fax: [phone]  Email: [email or web address]     DISCHARGE SUPPORT TEAM:  For Patients Discharge Needs & Updates  Phone: [phone] opt. 2 Fax: [phone]  Email: [email or web address]

## 2025-03-10 NOTE — UTILIZATION REVIEW
NOTIFICATION OF ADMISSION DISCHARGE   This is a Notification of Discharge from [facility]. Please be advised that this patient has been discharge from our facility. Below you will find the admission and discharge date and time including the patient’s disposition.   UTILIZATION REVIEW CONTACT:  Ida Law MA  Utilization   Network Utilization Review Department  Phone: [phone] x carefully listen to the prompts. All voicemails are confidential.  Email: [email or web address]     ADMISSION INFORMATION  PRESENTATION DATE: 3/8/2025  7:48 AM  OBERVATION ADMISSION DATE: 03/08/2025 0748  INPATIENT ADMISSION DATE: 3/8/25  8:11 AM   DISCHARGE DATE: 3/9/2025  4:59 PM   DISPOSITION:Home/Self Care    Network Utilization Review Department  ATTENTION: Please call with any questions or concerns to [phone] and carefully listen to the prompts so that you are directed to the right person. All voicemails are confidential.   For Discharge needs, contact Care Management DC Support Team at [phone] opt. 2  Send all requests for admission clinical reviews, approved or denied determinations and any other requests to dedicated fax number below belonging to the campus where the patient is receiving treatment. List of dedicated fax numbers for the Facilities:  FACILITY NAME UR FAX NUMBER   ADMISSION DENIALS (Administrative/Medical Necessity) [phone]   DISCHARGE SUPPORT TEAM ([facility]) [phone]   PARENT CHILD HEALTH (Maternity/NICU/Pediatrics) [phone]   [facility] [phone]   [facility] [phone]   Critical access hospital [phone]   [facility] [phone]   [facility] [phone]   [facility] [phone]   [facility] [phone]   [facility]  [place] [phone]   [facility] [phone]   [facility] [phone]   [facility] [phone]   [facility] [phone]

## 2025-03-11 ENCOUNTER — PATIENT OUTREACH (OUTPATIENT)
Dept: CASE MANAGEMENT | Facility: OTHER | Age: 40
End: 2025-03-11

## 2025-03-11 NOTE — PROGRESS NOTES
Outpatient Care Management Note    IB reminder: HRR referral- initial outreach needed. Chart reviewed.  Patient was hospitalized at [facility] 3/8/25-3/9/25.    RN CM placing outreach call now to patient to follow up after his recent hospitalization and to assess for any care management needs.  Call placed. No patient answer received. Left VM message and included RN CM contact information and request for return call.    RN CM will schedule a second outreach attempt for later this week if no patient response received prior.

## 2025-03-11 NOTE — ED ATTENDING ATTESTATION
3/8/2025  ILacy MD, saw and evaluated the patient. I have discussed the patient with the resident/non-physician practitioner and agree with the resident's/non-physician practitioner's findings, Plan of Care, and MDM as documented in the resident's/non-physician practitioner's note, except where noted. All available labs and Radiology studies were reviewed.  I was present for key portions of any procedure(s) performed by the resident/non-physician practitioner and I was immediately available to provide assistance.       At this point I agree with the current assessment done in the Emergency Department.  I have conducted an independent evaluation of this patient a history and physical is as follows:    ED Course  ED Course as of 03/10/25 2215   Sat Mar 08, 2025   0313 39-year-old gentleman presenting to the emergency department requesting help with detoxification from fentanyl and benzodiazepines.  History is slightly limited as patient is falling asleep during examination, constricted pupils.  Reports using additional substances just prior to arrival in the emergency department.  Has history of previous Sublocade and would be interested in restarting buprenorphine.    His vital signs on arrival within normal limits.    His physical exam is remarkable for an intoxicated appearing gentleman, falling asleep during exam, rousable with verbal and tactile stimuli.  Able to answer questions appropriately.    Lab work was ordered for detox clearance.  Will plan to reach out to detox unit for transfer once cleared.     0512 Patient transferred to detox unit for benzodiazepine use disorder with probability for benzodiazepine withdrawal and opioid use disorder desiring MAT.         Critical Care Time  Procedures

## 2025-03-13 ENCOUNTER — PATIENT OUTREACH (OUTPATIENT)
Dept: CASE MANAGEMENT | Facility: OTHER | Age: 40
End: 2025-03-13

## 2025-03-13 NOTE — PROGRESS NOTES
"Outpatient Care Management Note    HRR referral follow up- second outreach attempt needed.  Chart reviewed.  Patient was hospitalized at [facility] 3/8/25-3/9/25.    RN CM was unable to reach patient during first outreach attempt on 3/11/25. Second call attempt made now.  Spoke briefly with patient who stated that he was currently at work. Introduced self and reason for call.  Patient states that \"he is good right now\".  RN CM indicated that per notes at discharge, patient expressed interest in inpatient drug and alcohol rehab and inquired if he needed any assistance with that.  Patient declined need at this time stating he is doing outpatient. Patient declined need for RN CM assistance at this time stating that \"he is good\".    No other care management needs identified. RN CM will remove self from care team and close referral at this time.  "

## 2025-03-14 ENCOUNTER — APPOINTMENT (OUTPATIENT)
Dept: URGENT CARE | Age: 40
End: 2025-03-14

## 2025-03-21 ENCOUNTER — TRANSITIONAL CARE MANAGEMENT (OUTPATIENT)
Dept: FAMILY MEDICINE CLINIC | Facility: CLINIC | Age: 40
End: 2025-03-21

## 2025-04-29 ENCOUNTER — HOSPITAL ENCOUNTER (EMERGENCY)
Facility: HOSPITAL | Age: 40
Discharge: HOME | End: 2025-04-29
Attending: EMERGENCY MEDICINE

## 2025-04-29 VITALS
OXYGEN SATURATION: 98 % | DIASTOLIC BLOOD PRESSURE: 78 MMHG | SYSTOLIC BLOOD PRESSURE: 114 MMHG | WEIGHT: 172.62 LBS | RESPIRATION RATE: 16 BRPM | HEART RATE: 61 BPM | TEMPERATURE: 98.4 F | BODY MASS INDEX: 25.49 KG/M2

## 2025-04-29 DIAGNOSIS — F19.10 SUBSTANCE ABUSE (HCC): Primary | ICD-10-CM

## 2025-04-29 PROCEDURE — 99284 EMERGENCY DEPT VISIT MOD MDM: CPT | Performed by: PHYSICIAN ASSISTANT

## 2025-04-29 PROCEDURE — 99284 EMERGENCY DEPT VISIT MOD MDM: CPT

## 2025-04-29 NOTE — CERTIFIED RECOVERY SPECIALIST
"   Certified  Note    Patient name: Pravin Oliveros  Location: ED 07/ED 07  [place]: [facility]  Attending:  Nelia Irene, * MRN [id number]  : 1985  Age: 39 y.o.    Sex: male Date 2025         Substance Use History:     Social History     Substance and Sexual Activity   Alcohol Use Not Currently    Comment: social        Social History     Substance and Sexual Activity   Drug Use Yes    Types: Methamphetamines, Fentanyl, \"Crack\" cocaine     CRS provided introductions and explanation of service. CRS and patient engaged in conversation of hospitalization. Patient discussed needs he feels would be appropriate. CRS shared understanding.    [facility] and had recurrence of use. (Was sober September thr) [facility]     Patient declined HOST     Resources provided   Traci Mendes       "

## 2025-04-29 NOTE — ED NOTES
Pt currently sleeping. Respirations even and unlabored. Pulse Ox: 94% on RA. Will continue to monitor.      Aisha Pacheco RN  04/29/25 3400

## 2025-04-29 NOTE — ED NOTES
Pt had an unknown substance in his wallet, as per pt it is heroin. Substance disposed of by security with this Nurse as witness.      Aisha Pacheco RN  04/29/25 1761

## 2025-04-29 NOTE — ED PROVIDER NOTES
"Time reflects when diagnosis was documented in both MDM as applicable and the Disposition within this note       Time User Action Codes Description Comment    4/29/2025  3:03 PM Belkis Posey Add [F19.10] Substance abuse (HCC)           ED Disposition       ED Disposition   Discharge    Condition   Good    Date/Time   Tue Apr 29, 2025  3:04 PM    Comment   Pravin Oliveros discharge to home/self care.                   Assessment & Plan       Medical Decision Making  39-year-old male presenting via EMS admitting to recreational substance use reports that he believes the crack he wanted to smoke was heroin and denies any self-harm ideation with this.  He reports he has a recreational substance use history and adamantly declines Narcan.  Patient was educated on the need for Narcan if mental status declines or oxygen levels decline.      Patient able to maintain oxygen levels and mental status and demonstrated clinical sobriety prior to discharge and ambulated with a steady gait out of the ER after observation period of approximately 2 hours and 15 minutes.    Strict return to ED precautions discussed. Patient and/or family members verbalizes understanding and agrees with plan. Patient is stable for discharge     Portions of the record may have been created with voice recognition software. Occasional wrong word or \"sound a like\" substitutions may have occurred due to the inherent limitations of voice recognition software. Read the chart carefully and recognize, using context, where substitutions have occurred.        ED Course as of 04/29/25 1555   Tue Apr 29, 2025   1424 Patient has been resting comfortably is easily arousable has been maintaining oxygen saturations above 90% for the duration of his observation.   1553 Patient is awake alert oriented declined rehabilitation and host placement.  He request discharge at this time and has maintained an oxygen saturation above 90% for the duration of his observation " "and has had no adverse events, approximately 2 hours and 15 minutes.   1555 Patient ambulates with a steady gait out of the emergency department.       Medications - No data to display    ED Risk Strat Scores                    No data recorded        SBIRT 20yo+      Flowsheet Row Most Recent Value   Initial Alcohol Screen: US AUDIT-C     1. How often do you have a drink containing alcohol? 0 Filed at: 04/29/2025 1349   2. How many drinks containing alcohol do you have on a typical day you are drinking?  0 Filed at: 04/29/2025 1349   3a. Male UNDER 65: How often do you have five or more drinks on one occasion? 0 Filed at: 04/29/2025 1349   Audit-C Score 0 Filed at: 04/29/2025 1349   EYAL: How many times in the past year have you...    Used an illegal drug or used a prescription medication for non-medical reasons? Daily or Almost Daily Filed at: 04/29/2025 1349   DAST-10: In the past 12 months...    1. Have you used drugs other than those required for medical reasons? 1 Filed at: 04/29/2025 1349   2. Do you use more than one drug at a time? 1 Filed at: 04/29/2025 1349   3. Have you had medical problems as a result of your drug use (e.g., memory loss, hepatitis, convulsions, bleeding, etc.)? 0 Filed at: 04/29/2025 1349   4. Have you had \"blackouts\" or \"flashbacks\" as a result of drug use?YesNo 1 Filed at: 04/29/2025 1349   5. Do you ever feel bad or guilty about your drug use? 1 Filed at: 04/29/2025 1349   6. Does your spouse (or parent) ever complain about your involvement with drugs? 1 Filed at: 04/29/2025 1349   7. Have you neglected your family because of your use of drugs? 1 Filed at: 04/29/2025 1349   8. Have you engaged in illegal activities in order to obtain drugs? 0 Filed at: 04/29/2025 1349   9. Have you ever experienced withdrawal symptoms (felt sick) when you stopped taking drugs? 1 Filed at: 04/29/2025 9672   10. Are you always able to stop using drugs when you want to? 1 Filed at: 04/29/2025 0399 " "  DAST-10 Score 8 Filed at: 04/29/2025 9387                            History of Present Illness       Chief Complaint   Patient presents with    Overdose - Accidental     Pt brought in by EMS for OD. Pt states he thought he was smoking crack. During triage pt awake and alert with some drowsiness. No Narcan given by EMS.        Past Medical History:   Diagnosis Date    Anxiety     Cutaneous abscess of right knee 10/05/2020    Drug abuse (HCC)     Leukocytosis 12/22/2018    Substance abuse (HCC)       History reviewed. No pertinent surgical history.   Family History   Problem Relation Age of Onset    Diabetes Brother     Heart disease Maternal Grandmother       Social History     Tobacco Use    Smoking status: Every Day     Current packs/day: 0.25     Types: Cigarettes    Smokeless tobacco: Never    Tobacco comments:     occationally   Vaping Use    Vaping status: Never Used   Substance Use Topics    Alcohol use: Not Currently     Comment: social    Drug use: Yes     Types: Methamphetamines, Fentanyl, \"Crack\" cocaine      E-Cigarette/Vaping    E-Cigarette Use Never User       E-Cigarette/Vaping Substances      I have reviewed and agree with the history as documented.     39-year-old male presents today via EMS after admitting to recreational substance use he reports he believes there was heroin in the \"crack\" he was looking to smoke today recreationally.  He denies the use of any other recreational substances.  He adamantly declines Narcan and is conscious alert and oriented upon arrival to the ER.  Patient does have pinpoint pupils and admits to use of heroin today, patient was advised if his mental status declines/oxygen level dips below 90% Narcan will be administered, verbalized understanding.  Patient denies any other complaints.  Prehospital blood sugar reported to be 141.          Review of Systems   Constitutional:  Negative for chills, fatigue and fever.   HENT:  Negative for congestion, ear pain, " rhinorrhea and sore throat.    Eyes:  Negative for redness.   Respiratory:  Negative for chest tightness and shortness of breath.    Cardiovascular:  Negative for chest pain and palpitations.   Gastrointestinal:  Negative for abdominal pain, nausea and vomiting.   Genitourinary:  Negative for dysuria and hematuria.   Musculoskeletal: Negative.    Skin:  Negative for rash.   Neurological:  Negative for dizziness, syncope, light-headedness and numbness.           Objective       ED Triage Vitals [04/29/25 1347]   Temperature Pulse Blood Pressure Respirations SpO2 Patient Position - Orthostatic VS   98.4 °F (36.9 °C) 60 128/78 16 100 % Sitting      Temp Source Heart Rate Source BP Location FiO2 (%) Pain Score    Oral Monitor Left arm -- --      Vitals      Date and Time Temp Pulse SpO2 Resp BP Pain Score FACES Pain Rating User   04/29/25 1440 -- 52 94 % 17 113/63 -- -- JR   04/29/25 1347 98.4 °F (36.9 °C) 60 100 % 16 128/78 -- -- JR            Physical Exam  Vitals and nursing note reviewed.   Constitutional:       Appearance: Normal appearance. He is well-developed.      Comments: Patient arrives conscious alert and oriented, does fall asleep when not interacted with.   HENT:      Head: Normocephalic and atraumatic.   Eyes:      General: No scleral icterus.     Pupils: Pupils are equal, round, and reactive to light.      Comments: Pupils are equal and round approximately 1 mm, no nystagmus noted.   Cardiovascular:      Rate and Rhythm: Normal rate and regular rhythm.      Pulses: Normal pulses.   Pulmonary:      Effort: Pulmonary effort is normal. No respiratory distress.      Breath sounds: No stridor.   Abdominal:      General: There is no distension.      Palpations: There is no mass.   Musculoskeletal:      Cervical back: Normal range of motion.   Skin:     General: Skin is warm and dry.      Capillary Refill: Capillary refill takes less than 2 seconds.      Coloration: Skin is not jaundiced.   Neurological:       Mental Status: He is alert and oriented to person, place, and time.      Gait: Gait normal.   Psychiatric:         Mood and Affect: Mood normal.         Results Reviewed       None            No orders to display       Procedures    ED Medication and Procedure Management   Prior to Admission Medications   Prescriptions Last Dose Informant Patient Reported? Taking?   Narcan 4 MG/0.1ML nasal spray   Yes No   Si spray into each nostril   Patient not taking: Reported on 3/22/2025   buprenorphine-naloxone (Suboxone) 8-2 mg   Yes No   Sig: TAKE 1 FILM SUBLINGUALLY EVERY DAY   Patient not taking: Reported on 3/22/2025   hydrOXYzine pamoate (VISTARIL) 25 mg capsule   Yes No   Patient not taking: Reported on 3/22/2025      Facility-Administered Medications: None     Patient's Medications   Discharge Prescriptions    No medications on file     No discharge procedures on file.  ED SEPSIS DOCUMENTATION   Time reflects when diagnosis was documented in both MDM as applicable and the Disposition within this note       Time User Action Codes Description Comment    2025  3:03 PM Belkis Posey Add [F19.10] Substance abuse (HCC)                  Belkis Posey PA-C  25 1556

## 2025-04-29 NOTE — DISCHARGE INSTR - OTHER ORDERS
Traci Mendes   Certified     [facility]  [place] [place] [facility]  [phone]  Monday-Friday 6:45am-3:15pm[facility]   [address]   [place] [address]  [phone]    [facility]  [address]   [facility] [address]  [phone]    [facility]  [address] [facility] [address]  [phone]    [facility] [phone]   [place] [phone]  [place] [phone]  [place] [phone]   [phone]  [place] [phone]     [facility]  [address]  [place] [address]  [phone]    [place]   [phone]    [facility]  [address] [place]  [address]  [phone]    [facility]   [address] [facility] [address]  [phone]    [facility]   [address] [facility] [address]  [phone]    [place] [facility]  [address] [address]  [phone]    [facility]   [address] [place] [address] [place] [address]  [phone]

## 2025-04-29 NOTE — ED NOTES
Pt sitting in bed awake and alert. VSS.  Ambulated pt around ED, steady gait, no assistance needed       Rell Cross RN  04/29/25 9848

## 2025-04-29 NOTE — ED NOTES
Pt requested to use the bathroom. Provided pt with urinal to use at bedside. Pt falling asleep while standing to void. Required constant cuing to stay awake while standing. Pt safely returned to bed.      Aisha Pacheco RN  04/29/25 1400

## 2025-05-14 ENCOUNTER — HOSPITAL ENCOUNTER (INPATIENT)
Facility: HOSPITAL | Age: 40
LOS: 1 days | Discharge: LEFT BEFORE DISCHARGE | End: 2025-05-15
Attending: INTERNAL MEDICINE | Admitting: INTERNAL MEDICINE
Payer: COMMERCIAL

## 2025-05-14 ENCOUNTER — HOSPITAL ENCOUNTER (EMERGENCY)
Facility: HOSPITAL | Age: 40
End: 2025-05-14
Payer: COMMERCIAL

## 2025-05-14 VITALS
BODY MASS INDEX: 24.48 KG/M2 | SYSTOLIC BLOOD PRESSURE: 113 MMHG | TEMPERATURE: 97.5 F | WEIGHT: 165.79 LBS | OXYGEN SATURATION: 98 % | HEART RATE: 47 BPM | DIASTOLIC BLOOD PRESSURE: 68 MMHG | RESPIRATION RATE: 16 BRPM

## 2025-05-14 DIAGNOSIS — R00.1 BRADYCARDIA: ICD-10-CM

## 2025-05-14 DIAGNOSIS — F19.20 POLYSUBSTANCE DEPENDENCE INCLUDING OPIOID TYPE DRUG WITH COMPLICATION, CONTINUOUS USE (HCC): Primary | ICD-10-CM

## 2025-05-14 DIAGNOSIS — F19.10 SUBSTANCE ABUSE (HCC): Primary | ICD-10-CM

## 2025-05-14 LAB
ALBUMIN SERPL BCG-MCNC: 4.2 G/DL (ref 3.5–5)
ALP SERPL-CCNC: 53 U/L (ref 34–104)
ALT SERPL W P-5'-P-CCNC: 34 U/L (ref 7–52)
AMPHETAMINES SERPL QL SCN: NEGATIVE
ANION GAP SERPL CALCULATED.3IONS-SCNC: 7 MMOL/L (ref 4–13)
APAP SERPL-MCNC: <2 UG/ML (ref 10–20)
AST SERPL W P-5'-P-CCNC: 27 U/L (ref 13–39)
BARBITURATES UR QL: NEGATIVE
BASOPHILS # BLD AUTO: 0.05 THOUSANDS/ÂΜL (ref 0–0.1)
BASOPHILS NFR BLD AUTO: 0 % (ref 0–1)
BENZODIAZ UR QL: POSITIVE
BILIRUB DIRECT SERPL-MCNC: 0.23 MG/DL (ref 0–0.2)
BILIRUB SERPL-MCNC: 1.01 MG/DL (ref 0.2–1)
BUN SERPL-MCNC: 9 MG/DL (ref 5–25)
CALCIUM SERPL-MCNC: 9.2 MG/DL (ref 8.4–10.2)
CHLORIDE SERPL-SCNC: 101 MMOL/L (ref 96–108)
CO2 SERPL-SCNC: 33 MMOL/L (ref 21–32)
COCAINE UR QL: POSITIVE
CREAT SERPL-MCNC: 0.93 MG/DL (ref 0.6–1.3)
EOSINOPHIL # BLD AUTO: 0.1 THOUSAND/ÂΜL (ref 0–0.61)
EOSINOPHIL NFR BLD AUTO: 1 % (ref 0–6)
ERYTHROCYTE [DISTWIDTH] IN BLOOD BY AUTOMATED COUNT: 12.2 % (ref 11.6–15.1)
ETHANOL SERPL-MCNC: <10 MG/DL
FENTANYL UR QL SCN: POSITIVE
GFR SERPL CREATININE-BSD FRML MDRD: 103 ML/MIN/1.73SQ M
GLUCOSE SERPL-MCNC: 115 MG/DL (ref 65–140)
HCT VFR BLD AUTO: 41.7 % (ref 36.5–49.3)
HGB BLD-MCNC: 13.7 G/DL (ref 12–17)
HYDROCODONE UR QL SCN: NEGATIVE
IMM GRANULOCYTES # BLD AUTO: 0.11 THOUSAND/UL (ref 0–0.2)
IMM GRANULOCYTES NFR BLD AUTO: 1 % (ref 0–2)
LYMPHOCYTES # BLD AUTO: 1.84 THOUSANDS/ÂΜL (ref 0.6–4.47)
LYMPHOCYTES NFR BLD AUTO: 13 % (ref 14–44)
MCH RBC QN AUTO: 30.4 PG (ref 26.8–34.3)
MCHC RBC AUTO-ENTMCNC: 32.9 G/DL (ref 31.4–37.4)
MCV RBC AUTO: 93 FL (ref 82–98)
METHADONE UR QL: NEGATIVE
MONOCYTES # BLD AUTO: 0.49 THOUSAND/ÂΜL (ref 0.17–1.22)
MONOCYTES NFR BLD AUTO: 3 % (ref 4–12)
NEUTROPHILS # BLD AUTO: 11.67 THOUSANDS/ÂΜL (ref 1.85–7.62)
NEUTS SEG NFR BLD AUTO: 82 % (ref 43–75)
NRBC BLD AUTO-RTO: 0 /100 WBCS
OPIATES UR QL SCN: NEGATIVE
OXYCODONE+OXYMORPHONE UR QL SCN: NEGATIVE
PCP UR QL: NEGATIVE
PLATELET # BLD AUTO: 288 THOUSANDS/UL (ref 149–390)
PMV BLD AUTO: 9.1 FL (ref 8.9–12.7)
POTASSIUM SERPL-SCNC: 3.8 MMOL/L (ref 3.5–5.3)
PROT SERPL-MCNC: 6.4 G/DL (ref 6.4–8.4)
RBC # BLD AUTO: 4.5 MILLION/UL (ref 3.88–5.62)
SALICYLATES SERPL-MCNC: <5 MG/DL (ref 5–20)
SODIUM SERPL-SCNC: 141 MMOL/L (ref 135–147)
THC UR QL: NEGATIVE
WBC # BLD AUTO: 14.26 THOUSAND/UL (ref 4.31–10.16)

## 2025-05-14 PROCEDURE — 96360 HYDRATION IV INFUSION INIT: CPT

## 2025-05-14 PROCEDURE — 99285 EMERGENCY DEPT VISIT HI MDM: CPT

## 2025-05-14 PROCEDURE — 82077 ASSAY SPEC XCP UR&BREATH IA: CPT | Performed by: EMERGENCY MEDICINE

## 2025-05-14 PROCEDURE — 80076 HEPATIC FUNCTION PANEL: CPT | Performed by: EMERGENCY MEDICINE

## 2025-05-14 PROCEDURE — 85025 COMPLETE CBC W/AUTO DIFF WBC: CPT | Performed by: EMERGENCY MEDICINE

## 2025-05-14 PROCEDURE — 96361 HYDRATE IV INFUSION ADD-ON: CPT

## 2025-05-14 PROCEDURE — 99284 EMERGENCY DEPT VISIT MOD MDM: CPT

## 2025-05-14 PROCEDURE — 36415 COLL VENOUS BLD VENIPUNCTURE: CPT | Performed by: EMERGENCY MEDICINE

## 2025-05-14 PROCEDURE — 80143 DRUG ASSAY ACETAMINOPHEN: CPT | Performed by: EMERGENCY MEDICINE

## 2025-05-14 PROCEDURE — 80307 DRUG TEST PRSMV CHEM ANLYZR: CPT | Performed by: EMERGENCY MEDICINE

## 2025-05-14 PROCEDURE — 80179 DRUG ASSAY SALICYLATE: CPT | Performed by: EMERGENCY MEDICINE

## 2025-05-14 PROCEDURE — 93005 ELECTROCARDIOGRAM TRACING: CPT

## 2025-05-14 PROCEDURE — 80048 BASIC METABOLIC PNL TOTAL CA: CPT | Performed by: EMERGENCY MEDICINE

## 2025-05-14 RX ORDER — NALOXONE HYDROCHLORIDE 1 MG/ML
2 INJECTION INTRAMUSCULAR; INTRAVENOUS; SUBCUTANEOUS ONCE
Status: COMPLETED | OUTPATIENT
Start: 2025-05-14 | End: 2025-05-14

## 2025-05-14 RX ORDER — SODIUM CHLORIDE, SODIUM GLUCONATE, SODIUM ACETATE, POTASSIUM CHLORIDE, MAGNESIUM CHLORIDE, SODIUM PHOSPHATE, DIBASIC, AND POTASSIUM PHOSPHATE .53; .5; .37; .037; .03; .012; .00082 G/100ML; G/100ML; G/100ML; G/100ML; G/100ML; G/100ML; G/100ML
125 INJECTION, SOLUTION INTRAVENOUS CONTINUOUS
Status: DISCONTINUED | OUTPATIENT
Start: 2025-05-15 | End: 2025-05-15 | Stop reason: HOSPADM

## 2025-05-14 RX ORDER — CHLORHEXIDINE GLUCONATE ORAL RINSE 1.2 MG/ML
15 SOLUTION DENTAL EVERY 12 HOURS SCHEDULED
Status: DISCONTINUED | OUTPATIENT
Start: 2025-05-15 | End: 2025-05-15 | Stop reason: HOSPADM

## 2025-05-14 RX ORDER — SODIUM CHLORIDE, SODIUM GLUCONATE, SODIUM ACETATE, POTASSIUM CHLORIDE, MAGNESIUM CHLORIDE, SODIUM PHOSPHATE, DIBASIC, AND POTASSIUM PHOSPHATE .53; .5; .37; .037; .03; .012; .00082 G/100ML; G/100ML; G/100ML; G/100ML; G/100ML; G/100ML; G/100ML
1000 INJECTION, SOLUTION INTRAVENOUS ONCE
Status: COMPLETED | OUTPATIENT
Start: 2025-05-15 | End: 2025-05-15

## 2025-05-14 RX ORDER — NALOXONE HYDROCHLORIDE 1 MG/ML
INJECTION INTRAMUSCULAR; INTRAVENOUS; SUBCUTANEOUS
Status: COMPLETED
Start: 2025-05-14 | End: 2025-05-14

## 2025-05-14 RX ADMIN — NALOXONE HYDROCHLORIDE 2 MG: 1 INJECTION INTRAMUSCULAR; INTRAVENOUS; SUBCUTANEOUS at 18:43

## 2025-05-14 RX ADMIN — NALOXONE HYDROCHLORIDE 2 MG: 1 INJECTION INTRAMUSCULAR; INTRAVENOUS; SUBCUTANEOUS at 18:22

## 2025-05-14 RX ADMIN — NALOXONE HYDROCHLORIDE 2 MG: 1 INJECTION INTRAMUSCULAR; INTRAVENOUS; SUBCUTANEOUS at 20:04

## 2025-05-14 RX ADMIN — SODIUM CHLORIDE 1000 ML: 0.9 INJECTION, SOLUTION INTRAVENOUS at 20:03

## 2025-05-14 NOTE — ED CARE HANDOFF
Emergency Department Sign Out Note        Sign out and transfer of care from Dr Veliz. See Separate Emergency Department note.     The patient, Pravin Oliveros, was evaluated by the previous provider for od.    Workup Completed:  As above    ED Course / Workup Pending (followup):  Await baseline neuro status        No data recorded                          ED Course as of 05/14/25 2106   Wed May 14, 2025   2104 Patient with continued bradycardia throughout his stay here sats have been normal blood pressure has been low normal to normal patient is easy to wake but falls back to sleep suspect xylazine on top of the opioids case was discussed with toxicology and critical care will admit for further care to stepdown 2  Lites show no gap acidosis     Procedures  Medical Decision Making  Risk  Prescription drug management.            Disposition  Final diagnoses:   None     ED Disposition       None          Follow-up Information    None       Patient's Medications   Discharge Prescriptions    No medications on file     No discharge procedures on file.       ED Provider  Electronically Signed by     Sarbjit Rodrigues MD  05/14/25 1944       Sarbjit Rodrigues MD  05/14/25 2106

## 2025-05-14 NOTE — Clinical Note
Case was discussed with Dr Moy and the patient's admission status was agreed to be Admission Status: inpatient status to the service of Dr. Moy .

## 2025-05-15 VITALS
DIASTOLIC BLOOD PRESSURE: 76 MMHG | HEIGHT: 69 IN | WEIGHT: 171.74 LBS | SYSTOLIC BLOOD PRESSURE: 123 MMHG | BODY MASS INDEX: 25.44 KG/M2 | HEART RATE: 63 BPM | RESPIRATION RATE: 13 BRPM | OXYGEN SATURATION: 100 % | TEMPERATURE: 97.4 F

## 2025-05-15 PROBLEM — T50.901A DRUG OVERDOSE: Status: ACTIVE | Noted: 2025-05-15

## 2025-05-15 LAB
ALBUMIN SERPL BCG-MCNC: 3.5 G/DL (ref 3.5–5)
ALP SERPL-CCNC: 44 U/L (ref 34–104)
ALT SERPL W P-5'-P-CCNC: 28 U/L (ref 7–52)
ANION GAP SERPL CALCULATED.3IONS-SCNC: 5 MMOL/L (ref 4–13)
ANION GAP SERPL CALCULATED.3IONS-SCNC: 6 MMOL/L (ref 4–13)
ARTERIAL PATENCY WRIST A: NO
AST SERPL W P-5'-P-CCNC: 23 U/L (ref 13–39)
ATRIAL RATE: 49 BPM
BASE EX.OXY STD BLDV CALC-SCNC: 95.8 % (ref 60–80)
BASE EXCESS BLDV CALC-SCNC: 0.8 MMOL/L
BASOPHILS # BLD AUTO: 0.03 THOUSANDS/ÂΜL (ref 0–0.1)
BASOPHILS # BLD AUTO: 0.05 THOUSANDS/ÂΜL (ref 0–0.1)
BASOPHILS NFR BLD AUTO: 0 % (ref 0–1)
BASOPHILS NFR BLD AUTO: 1 % (ref 0–1)
BILIRUB DIRECT SERPL-MCNC: 0.15 MG/DL (ref 0–0.2)
BILIRUB SERPL-MCNC: 1.02 MG/DL (ref 0.2–1)
BODY TEMPERATURE: 97.5 DEGREES FEHRENHEIT
BUN SERPL-MCNC: 9 MG/DL (ref 5–25)
BUN SERPL-MCNC: 9 MG/DL (ref 5–25)
CA-I BLD-SCNC: 1.13 MMOL/L (ref 1.12–1.32)
CALCIUM SERPL-MCNC: 8.4 MG/DL (ref 8.4–10.2)
CALCIUM SERPL-MCNC: 8.4 MG/DL (ref 8.4–10.2)
CHLORIDE SERPL-SCNC: 106 MMOL/L (ref 96–108)
CHLORIDE SERPL-SCNC: 106 MMOL/L (ref 96–108)
CO2 SERPL-SCNC: 27 MMOL/L (ref 21–32)
CO2 SERPL-SCNC: 28 MMOL/L (ref 21–32)
CREAT SERPL-MCNC: 0.67 MG/DL (ref 0.6–1.3)
CREAT SERPL-MCNC: 0.69 MG/DL (ref 0.6–1.3)
EOSINOPHIL # BLD AUTO: 0.03 THOUSAND/ÂΜL (ref 0–0.61)
EOSINOPHIL # BLD AUTO: 0.06 THOUSAND/ÂΜL (ref 0–0.61)
EOSINOPHIL NFR BLD AUTO: 0 % (ref 0–6)
EOSINOPHIL NFR BLD AUTO: 1 % (ref 0–6)
ERYTHROCYTE [DISTWIDTH] IN BLOOD BY AUTOMATED COUNT: 12.2 % (ref 11.6–15.1)
ERYTHROCYTE [DISTWIDTH] IN BLOOD BY AUTOMATED COUNT: 12.9 % (ref 11.6–15.1)
GFR SERPL CREATININE-BSD FRML MDRD: 120 ML/MIN/1.73SQ M
GFR SERPL CREATININE-BSD FRML MDRD: 121 ML/MIN/1.73SQ M
GLUCOSE SERPL-MCNC: 100 MG/DL (ref 65–140)
GLUCOSE SERPL-MCNC: 103 MG/DL (ref 65–140)
HCO3 BLDV-SCNC: 26 MMOL/L (ref 24–30)
HCT VFR BLD AUTO: 34.3 % (ref 36.5–49.3)
HCT VFR BLD AUTO: 37.8 % (ref 36.5–49.3)
HGB BLD-MCNC: 11.9 G/DL (ref 12–17)
HGB BLD-MCNC: 13.1 G/DL (ref 12–17)
IMM GRANULOCYTES # BLD AUTO: 0.02 THOUSAND/UL (ref 0–0.2)
IMM GRANULOCYTES # BLD AUTO: 0.02 THOUSAND/UL (ref 0–0.2)
IMM GRANULOCYTES NFR BLD AUTO: 0 % (ref 0–2)
IMM GRANULOCYTES NFR BLD AUTO: 0 % (ref 0–2)
LACTATE SERPL-SCNC: 0.8 MMOL/L (ref 0.5–2)
LYMPHOCYTES # BLD AUTO: 1.19 THOUSANDS/ÂΜL (ref 0.6–4.47)
LYMPHOCYTES # BLD AUTO: 1.85 THOUSANDS/ÂΜL (ref 0.6–4.47)
LYMPHOCYTES NFR BLD AUTO: 11 % (ref 14–44)
LYMPHOCYTES NFR BLD AUTO: 20 % (ref 14–44)
MAGNESIUM SERPL-MCNC: 2.1 MG/DL (ref 1.9–2.7)
MAGNESIUM SERPL-MCNC: 2.1 MG/DL (ref 1.9–2.7)
MCH RBC QN AUTO: 31 PG (ref 26.8–34.3)
MCH RBC QN AUTO: 31 PG (ref 26.8–34.3)
MCHC RBC AUTO-ENTMCNC: 34.7 G/DL (ref 31.4–37.4)
MCHC RBC AUTO-ENTMCNC: 34.7 G/DL (ref 31.4–37.4)
MCV RBC AUTO: 89 FL (ref 82–98)
MCV RBC AUTO: 89 FL (ref 82–98)
MONOCYTES # BLD AUTO: 0.32 THOUSAND/ÂΜL (ref 0.17–1.22)
MONOCYTES # BLD AUTO: 0.38 THOUSAND/ÂΜL (ref 0.17–1.22)
MONOCYTES NFR BLD AUTO: 3 % (ref 4–12)
MONOCYTES NFR BLD AUTO: 4 % (ref 4–12)
NASAL CANNULA: 2
NEUTROPHILS # BLD AUTO: 6.99 THOUSANDS/ÂΜL (ref 1.85–7.62)
NEUTROPHILS # BLD AUTO: 9.73 THOUSANDS/ÂΜL (ref 1.85–7.62)
NEUTS SEG NFR BLD AUTO: 74 % (ref 43–75)
NEUTS SEG NFR BLD AUTO: 86 % (ref 43–75)
NON VENT ROOM AIR: 99 %
NRBC BLD AUTO-RTO: 0 /100 WBCS
NRBC BLD AUTO-RTO: 0 /100 WBCS
O2 CT BLDV-SCNC: 17.5 ML/DL
P AXIS: 70 DEGREES
PCO2 BLD: 43.1 MM HG (ref 42–50)
PCO2 BLDV: 44.2 MM HG (ref 42–50)
PH BLD: 7.4 [PH] (ref 7.3–7.4)
PH BLDV: 7.39 [PH] (ref 7.3–7.4)
PHOSPHATE SERPL-MCNC: 3.1 MG/DL (ref 2.7–4.5)
PHOSPHATE SERPL-MCNC: 3.2 MG/DL (ref 2.7–4.5)
PLATELET # BLD AUTO: 235 THOUSANDS/UL (ref 149–390)
PLATELET # BLD AUTO: 274 THOUSANDS/UL (ref 149–390)
PMV BLD AUTO: 10.2 FL (ref 8.9–12.7)
PMV BLD AUTO: 9.2 FL (ref 8.9–12.7)
PO2 BLDV: 91.9 MM HG (ref 35–45)
PO2 VENOUS TEMP CORRECTED: 88.5 MM HG (ref 35–45)
POTASSIUM SERPL-SCNC: 4.2 MMOL/L (ref 3.5–5.3)
POTASSIUM SERPL-SCNC: 4.2 MMOL/L (ref 3.5–5.3)
PR INTERVAL: 138 MS
PROT SERPL-MCNC: 5.1 G/DL (ref 6.4–8.4)
QRS AXIS: 62 DEGREES
QRSD INTERVAL: 98 MS
QT INTERVAL: 448 MS
QTC INTERVAL: 405 MS
RBC # BLD AUTO: 3.84 MILLION/UL (ref 3.88–5.62)
RBC # BLD AUTO: 4.23 MILLION/UL (ref 3.88–5.62)
SODIUM SERPL-SCNC: 139 MMOL/L (ref 135–147)
SODIUM SERPL-SCNC: 139 MMOL/L (ref 135–147)
T WAVE AXIS: 32 DEGREES
VENTRICULAR RATE: 49 BPM
WBC # BLD AUTO: 11.32 THOUSAND/UL (ref 4.31–10.16)
WBC # BLD AUTO: 9.35 THOUSAND/UL (ref 4.31–10.16)

## 2025-05-15 PROCEDURE — 82330 ASSAY OF CALCIUM: CPT

## 2025-05-15 PROCEDURE — 80076 HEPATIC FUNCTION PANEL: CPT

## 2025-05-15 PROCEDURE — 85025 COMPLETE CBC W/AUTO DIFF WBC: CPT

## 2025-05-15 PROCEDURE — 83735 ASSAY OF MAGNESIUM: CPT

## 2025-05-15 PROCEDURE — 99223 1ST HOSP IP/OBS HIGH 75: CPT | Performed by: INTERNAL MEDICINE

## 2025-05-15 PROCEDURE — 80048 BASIC METABOLIC PNL TOTAL CA: CPT

## 2025-05-15 PROCEDURE — 84100 ASSAY OF PHOSPHORUS: CPT

## 2025-05-15 PROCEDURE — 83605 ASSAY OF LACTIC ACID: CPT

## 2025-05-15 PROCEDURE — 93010 ELECTROCARDIOGRAM REPORT: CPT | Performed by: INTERNAL MEDICINE

## 2025-05-15 PROCEDURE — 82805 BLOOD GASES W/O2 SATURATION: CPT

## 2025-05-15 RX ADMIN — CHLORHEXIDINE GLUCONATE 15 ML: 1.2 SOLUTION ORAL at 08:50

## 2025-05-15 RX ADMIN — SODIUM CHLORIDE, SODIUM GLUCONATE, SODIUM ACETATE, POTASSIUM CHLORIDE, MAGNESIUM CHLORIDE, SODIUM PHOSPHATE, DIBASIC, AND POTASSIUM PHOSPHATE 125 ML/HR: .53; .5; .37; .037; .03; .012; .00082 INJECTION, SOLUTION INTRAVENOUS at 00:32

## 2025-05-15 RX ADMIN — SODIUM CHLORIDE, SODIUM GLUCONATE, SODIUM ACETATE, POTASSIUM CHLORIDE, MAGNESIUM CHLORIDE, SODIUM PHOSPHATE, DIBASIC, AND POTASSIUM PHOSPHATE 1000 ML: .53; .5; .37; .037; .03; .012; .00082 INJECTION, SOLUTION INTRAVENOUS at 00:27

## 2025-05-15 RX ADMIN — CHLORHEXIDINE GLUCONATE 15 ML: 1.2 SOLUTION ORAL at 00:03

## 2025-05-15 RX ADMIN — SODIUM CHLORIDE, SODIUM GLUCONATE, SODIUM ACETATE, POTASSIUM CHLORIDE, MAGNESIUM CHLORIDE, SODIUM PHOSPHATE, DIBASIC, AND POTASSIUM PHOSPHATE 125 ML/HR: .53; .5; .37; .037; .03; .012; .00082 INJECTION, SOLUTION INTRAVENOUS at 08:50

## 2025-05-15 NOTE — ASSESSMENT & PLAN NOTE
"31-year-old male with a history of polysubstance abuse.  Patient reports he normally uses \"bags of fentanyl\".  Patient presented to the emergency department with suspected overdose, lethargy and bradycardia.  UDS positive for benzodiazepines, cocaine, fentanyl.  ED discussed case with toxicology with possible suspected xylazine and bradycardia at 35.  They recommended transfer to higher level of care.  Upon arrival to [facility], patient is alert and oriented, heart rate 43-57, sinus bradycardia, no hypotension    Plan  Continue fluid resuscitation  Consider toxicology consult  Continue cardiac monitoring  "

## 2025-05-15 NOTE — UTILIZATION REVIEW
NOTIFICATION OF ADMISSION DISCHARGE   This is a Notification of Discharge from [facility]. Please be advised that this patient has been discharge from our facility. Below you will find the admission and discharge date and time including the patient’s disposition.   UTILIZATION REVIEW CONTACT:  Utilization Review Assistants  Network Utilization Review Department  Phone: [phone] x carefully listen to the prompts. All voicemails are confidential.  Email: [email or web address]     ADMISSION INFORMATION  PRESENTATION DATE: 5/14/2025 11:46 PM  OBERVATION ADMISSION DATE: N/A  INPATIENT ADMISSION DATE: 5/14/25 11:46 PM   DISCHARGE DATE: 5/15/2025 11:45 AM   DISPOSITION:Home/Self Care    Network Utilization Review Department  ATTENTION: Please call with any questions or concerns to [phone] and carefully listen to the prompts so that you are directed to the right person. All voicemails are confidential.   For Discharge needs, contact Care Management DC Support Team at [phone] opt. 2  Send all requests for admission clinical reviews, approved or denied determinations and any other requests to dedicated fax number below belonging to the campus where the patient is receiving treatment. List of dedicated fax numbers for the Facilities:  FACILITY NAME UR FAX NUMBER   ADMISSION DENIALS (Administrative/Medical Necessity) [phone]   DISCHARGE SUPPORT TEAM ([facility]) [phone]   PARENT CHILD HEALTH (Maternity/NICU/Pediatrics) [phone]   [facility] [phone]   [facility] [phone]   [facility] [phone]   [facility] [phone]   [facility] [phone]   [facility] [phone]   [facility] [phone]   [facility] [phone]   [facility]  [facility] [phone]   [facility] [phone]   [facility] [phone]   [facility] [phone]

## 2025-05-15 NOTE — DISCHARGE INSTR - OTHER ORDERS
Traci Mendes   Certified     [facility], [place] and [facility]  [phone]  Monday-Friday 6:45am-3:15pm    [facility]   [email or web address]    NA   [email or web address]    MOUD providers     [email or web address]    [facility]   [address] [facility] [address]   [phone]    [facility]   [address] 1st floor   [place] [address]  [phone]    [facility]   [address] [facility]   [place] [address]  [phone]    [facility] [place]  [address]  [place] [address]  [phone]    [facility] [place]   [address] [place] [address]   [place] [address]  [phone]     [facility]   [phone]    [facility] for online providers   [email or web address]  [phone]

## 2025-05-15 NOTE — PLAN OF CARE
Problem: PAIN - ADULT  Goal: Verbalizes/displays adequate comfort level or baseline comfort level  Description: Interventions:  - Encourage patient to monitor pain and request assistance  - Assess pain using appropriate pain scale  - Administer analgesics as ordered based on type and severity of pain and evaluate response  - Implement non-pharmacological measures as appropriate and evaluate response  - Consider cultural and social influences on pain and pain management  - Notify physician/advanced practitioner if interventions unsuccessful or patient reports new pain  - Educate patient/family on pain management process including their role and importance of  reporting pain   - Provide non-pharmacologic/complimentary pain relief interventions  Outcome: Progressing     Problem: INFECTION - ADULT  Goal: Absence or prevention of progression during hospitalization  Description: INTERVENTIONS:  - Assess and monitor for signs and symptoms of infection  - Monitor lab/diagnostic results  - Monitor all insertion sites, i.e. indwelling lines, tubes, and drains  - Monitor endotracheal if appropriate and nasal secretions for changes in amount and color  - [place] appropriate cooling/warming therapies per order  - Administer medications as ordered  - Instruct and encourage patient and family to use good hand hygiene technique  - Identify and instruct in appropriate isolation precautions for identified infection/condition  Outcome: Progressing     Problem: SAFETY ADULT  Goal: Patient will remain free of falls  Description: INTERVENTIONS:  - Educate patient/family on patient safety including physical limitations  - Instruct patient to call for assistance with activity   - Consider consulting OT/PT to assist with strengthening/mobility based on AM PAC & JH-HLM score  - Consult OT/PT to assist with strengthening/mobility   - Keep Call bell within reach  - Keep bed low and locked with side rails adjusted as appropriate  - Keep  care items and personal belongings within reach  - Initiate and maintain comfort rounds  - Make Fall Risk Sign visible to staff  - Offer Toileting every  Hours, in advance of need  - Initiate/Maintain alarm  - Obtain necessary fall risk management equipment:   - Apply yellow socks and bracelet for high fall risk patients  - Consider moving patient to room near nurses station  Outcome: Progressing  Goal: Maintain or return to baseline ADL function  Description: INTERVENTIONS:  -  Assess patient's ability to carry out ADLs; assess patient's baseline for ADL function and identify physical deficits which impact ability to perform ADLs (bathing, care of mouth/teeth, toileting, grooming, dressing, etc.)  - Assess/evaluate cause of self-care deficits   - Assess range of motion  - Assess patient's mobility; develop plan if impaired  - Assess patient's need for assistive devices and provide as appropriate  - Encourage maximum independence but intervene and supervise when necessary  - Involve family in performance of ADLs  - Assess for home care needs following discharge   - Consider OT consult to assist with ADL evaluation and planning for discharge  - Provide patient education as appropriate  - Monitor functional capacity and physical performance, use of AM PAC & JH-HLM   - Monitor gait, balance and fatigue with ambulation    Outcome: Progressing  Goal: Maintains/Returns to pre admission functional level  Description: INTERVENTIONS:  - Perform AM-PAC 6 Click Basic Mobility/ Daily Activity assessment daily.  - Set and communicate daily mobility goal to care team and patient/family/caregiver.   - Collaborate with rehabilitation services on mobility goals if consulted  - Perform Range of Motion  times a day.  - Reposition patient every  hours.  - Dangle patient  times a day  - Stand patient  times a day  - Ambulate patient  times a day  - Out of bed to chair  times a day   - Out of bed for meals  times a day  - Out of bed for  toileting  - Record patient progress and toleration of activity level   Outcome: Progressing     Problem: DISCHARGE PLANNING  Goal: Discharge to home or other facility with appropriate resources  Description: INTERVENTIONS:  - Identify barriers to discharge w/patient and caregiver  - Arrange for needed discharge resources and transportation as appropriate  - Identify discharge learning needs (meds, wound care, etc.)  - Arrange for interpretive services to assist at discharge as needed  - Refer to Case Management Department for coordinating discharge planning if the patient needs post-hospital services based on physician/advanced practitioner order or complex needs related to functional status, cognitive ability, or social support system  Outcome: Progressing     Problem: Knowledge Deficit  Goal: Patient/family/caregiver demonstrates understanding of disease process, treatment plan, medications, and discharge instructions  Description: Complete learning assessment and assess knowledge base.  Interventions:  - Provide teaching at level of understanding  - Provide teaching via preferred learning methods  Outcome: Progressing     Problem: Nutrition/Hydration-ADULT  Goal: Nutrient/Hydration intake appropriate for improving, restoring or maintaining nutritional needs  Description: Monitor and assess patient's nutrition/hydration status for malnutrition. Collaborate with interdisciplinary team and initiate plan and interventions as ordered.  Monitor patient's weight and dietary intake as ordered or per policy. Utilize nutrition screening tool and intervene as necessary. Determine patient's food preferences and provide high-protein, high-caloric foods as appropriate.     INTERVENTIONS:  - Monitor oral intake, urinary output, labs, and treatment plans  - Assess nutrition and hydration status and recommend course of action  - Evaluate amount of meals eaten  - Assist patient with eating if necessary   - Allow adequate time for  meals  - Recommend/ encourage appropriate diets, oral nutritional supplements, and vitamin/mineral supplements  - Order, calculate, and assess calorie counts as needed  - Recommend, monitor, and adjust tube feedings and TPN/PPN based on assessed needs  - Assess need for intravenous fluids  - Provide specific nutrition/hydration education as appropriate  - Include patient/family/caregiver in decisions related to nutrition  Outcome: Progressing     Problem: NEUROSENSORY - ADULT  Goal: Achieves stable or improved neurological status  Description: INTERVENTIONS  - Monitor and report changes in neurological status  - Monitor vital signs such as temperature, blood pressure, glucose, and any other labs ordered   - Initiate measures to prevent increased intracranial pressure  - Monitor for seizure activity and implement precautions if appropriate      Outcome: Progressing  Goal: Remains free of injury related to seizures activity  Description: INTERVENTIONS  - Maintain airway, patient safety  and administer oxygen as ordered  - Monitor patient for seizure activity, document and report duration and description of seizure to physician/advanced practitioner  - If seizure occurs,  ensure patient safety during seizure  - Reorient patient post seizure  - Seizure pads on all 4 side rails  - Instruct patient/family to notify RN of any seizure activity including if an aura is experienced  - Instruct patient/family to call for assistance with activity based on nursing assessment  - Administer anti-seizure medications if ordered    Outcome: Progressing  Goal: Achieves maximal functionality and self care  Description: INTERVENTIONS  - Monitor swallowing and airway patency with patient fatigue and changes in neurological status  - Encourage and assist patient to increase activity and self care.   - Encourage visually impaired, hearing impaired and aphasic patients to use assistive/communication devices  Outcome: Progressing     Problem:  CARDIOVASCULAR - ADULT  Goal: Maintains optimal cardiac output and hemodynamic stability  Description: INTERVENTIONS:  - Monitor I/O, vital signs and rhythm  - Monitor for S/S and trends of decreased cardiac output  - Administer and titrate ordered vasoactive medications to optimize hemodynamic stability  - Assess quality of pulses, skin color and temperature  - Assess for signs of decreased coronary artery perfusion  - Instruct patient to report change in severity of symptoms  Outcome: Progressing  Goal: Absence of cardiac dysrhythmias or at baseline rhythm  Description: INTERVENTIONS:  - Continuous cardiac monitoring, vital signs, obtain 12 lead EKG if ordered  - Administer antiarrhythmic and heart rate control medications as ordered  - Monitor electrolytes and administer replacement therapy as ordered  Outcome: Progressing     Problem: RESPIRATORY - ADULT  Goal: Achieves optimal ventilation and oxygenation  Description: INTERVENTIONS:  - Assess for changes in respiratory status  - Assess for changes in mentation and behavior  - Position to facilitate oxygenation and minimize respiratory effort  - Oxygen administered by appropriate delivery if ordered  - Initiate smoking cessation education as indicated  - Encourage broncho-pulmonary hygiene including cough, deep breathe, Incentive Spirometry  - Assess the need for suctioning and aspirate as needed  - Assess and instruct to report SOB or any respiratory difficulty  - Respiratory Therapy support as indicated  Outcome: Progressing     Problem: GASTROINTESTINAL - ADULT  Goal: Minimal or absence of nausea and/or vomiting  Description: INTERVENTIONS:  - Administer IV fluids if ordered to ensure adequate hydration  - Maintain NPO status until nausea and vomiting are resolved  - Nasogastric tube if ordered  - Administer ordered antiemetic medications as needed  - Provide nonpharmacologic comfort measures as appropriate  - Advance diet as tolerated, if ordered  - Consider  nutrition services referral to assist patient with adequate nutrition and appropriate food choices  Outcome: Progressing  Goal: Maintains or returns to baseline bowel function  Description: INTERVENTIONS:  - Assess bowel function  - Encourage oral fluids to ensure adequate hydration  - Administer IV fluids if ordered to ensure adequate hydration  - Administer ordered medications as needed  - Encourage mobilization and activity  - Consider nutritional services referral to assist patient with adequate nutrition and appropriate food choices  Outcome: Progressing  Goal: Maintains adequate nutritional intake  Description: INTERVENTIONS:  - Monitor percentage of each meal consumed  - Identify factors contributing to decreased intake, treat as appropriate  - Assist with meals as needed  - Monitor I&O, weight, and lab values if indicated  - Obtain nutrition services referral as needed  Outcome: Progressing  Goal: Establish and maintain optimal ostomy function  Description: INTERVENTIONS:  - Assess bowel function  - Encourage oral fluids to ensure adequate hydration  - Administer IV fluids if ordered to ensure adequate hydration   - Administer ordered medications as needed  - Encourage mobilization and activity  - Nutrition services referral to assist patient with appropriate food choices  - Assess stoma site  - Consider wound care consult   Outcome: Progressing  Goal: Oral mucous membranes remain intact  Description: INTERVENTIONS  - Assess oral mucosa and hygiene practices  - Implement preventative oral hygiene regimen  - Implement oral medicated treatments as ordered  - Initiate Nutrition services referral as needed  Outcome: Progressing     Problem: GENITOURINARY - ADULT  Goal: Maintains or returns to baseline urinary function  Description: INTERVENTIONS:  - Assess urinary function  - Encourage oral fluids to ensure adequate hydration if ordered  - Administer IV fluids as ordered to ensure adequate hydration  -  Administer ordered medications as needed  - Offer frequent toileting  - Follow urinary retention protocol if ordered  Outcome: Progressing  Goal: Absence of urinary retention  Description: INTERVENTIONS:  - Assess patient’s ability to void and empty bladder  - Monitor I/O  - Bladder scan as needed  - Discuss with physician/AP medications to alleviate retention as needed  - Discuss catheterization for long term situations as appropriate  Outcome: Progressing  Goal: Urinary catheter remains patent  Description: INTERVENTIONS:  - Assess patency of urinary catheter  - If patient has a chronic miller, consider changing catheter if non-functioning  - Follow guidelines for intermittent irrigation of non-functioning urinary catheter  Outcome: Progressing     Problem: METABOLIC, FLUID AND ELECTROLYTES - ADULT  Goal: Electrolytes maintained within normal limits  Description: INTERVENTIONS:  - Monitor labs and assess patient for signs and symptoms of electrolyte imbalances  - Administer electrolyte replacement as ordered  - Monitor response to electrolyte replacements, including repeat lab results as appropriate  - Instruct patient on fluid and nutrition as appropriate  Outcome: Progressing  Goal: Fluid balance maintained  Description: INTERVENTIONS:  - Monitor labs   - Monitor I/O and WT  - Instruct patient on fluid and nutrition as appropriate  - Assess for signs & symptoms of volume excess or deficit  Outcome: Progressing  Goal: Glucose maintained within target range  Description: INTERVENTIONS:  - Monitor Blood Glucose as ordered  - Assess for signs and symptoms of hyperglycemia and hypoglycemia  - Administer ordered medications to maintain glucose within target range  - Assess nutritional intake and initiate nutrition service referral as needed  Outcome: Progressing     Problem: SKIN/TISSUE INTEGRITY - ADULT  Goal: Skin Integrity remains intact(Skin Breakdown Prevention)  Description: Assess:  -Perform [place] assessment  every   -Clean and moisturize skin every   -Inspect skin when repositioning, toileting, and assisting with ADLS  -Assess under medical devices such as  every   -Assess extremities for adequate circulation and sensation     Bed Management:  -Have minimal linens on bed & keep smooth, unwrinkled  -Change linens as needed when moist or perspiring  -Avoid sitting or lying in one position for more than  hours while in bed  -Keep HOB at degrees     Toileting:  -Offer bedside commode  -Assess for incontinence every   -Use incontinent care products after each incontinent episode such as     Activity:  -Mobilize patient  times a day  -Encourage activity and walks on unit  -Encourage or provide ROM exercises   -Turn and reposition patient every  Hours  -Use appropriate equipment to lift or move patient in bed  -Instruct/ Assist with weight shifting every  when out of bed in chair  -Consider limitation of chair time  hour intervals    Skin Care:  -Avoid use of baby powder, tape, friction and shearing, hot water or constrictive clothing  -Relieve pressure over bony prominences using   -Do not massage red bony areas    Next Steps:  -Teach patient strategies to minimize risks such as    -Consider consults to  interdisciplinary teams such as   Outcome: Progressing  Goal: Incision(s), wounds(s) or drain site(s) healing without S/S of infection  Description: INTERVENTIONS  - Assess and document dressing, incision, wound bed, drain sites and surrounding tissue  - Provide patient and family education  - Perform skin care/dressing changes every   Outcome: Progressing  Goal: Pressure injury heals and does not worsen  Description: Interventions:  - Implement low air loss mattress or specialty surface (Criteria met)  - Apply silicone foam dressing  - Instruct/assist with weight shifting every  minutes when in chair   - Limit chair time to  hour intervals  - Use special pressure reducing interventions such as  when in chair   - Apply fecal  or urinary incontinence containment device   - Perform passive or active ROM every   - Turn and reposition patient & offload bony prominences every  hours   - Utilize friction reducing device or surface for transfers   - Consider consults to  interdisciplinary teams such as   - Use incontinent care products after each incontinent episode such as   - Consider nutrition services referral as needed  Outcome: Progressing     Problem: HEMATOLOGIC - ADULT  Goal: Maintains hematologic stability  Description: INTERVENTIONS  - Assess for signs and symptoms of bleeding or hemorrhage  - Monitor labs  - Administer supportive blood products/factors as ordered and appropriate  Outcome: Progressing     Problem: MUSCULOSKELETAL - ADULT  Goal: Maintain or return mobility to safest level of function  Description: INTERVENTIONS:  - Assess patient's ability to carry out ADLs; assess patient's baseline for ADL function and identify physical deficits which impact ability to perform ADLs (bathing, care of mouth/teeth, toileting, grooming, dressing, etc.)  - Assess/evaluate cause of self-care deficits   - Assess range of motion  - Assess patient's mobility  - Assess patient's need for assistive devices and provide as appropriate  - Encourage maximum independence but intervene and supervise when necessary  - Involve family in performance of ADLs  - Assess for home care needs following discharge   - Consider OT consult to assist with ADL evaluation and planning for discharge  - Provide patient education as appropriate  Outcome: Progressing  Goal: Maintain proper alignment of affected body part  Description: INTERVENTIONS:  - Support, maintain and protect limb and body alignment  - Provide patient/ family with appropriate education  Outcome: Progressing     Problem: COPING  Goal: Pt/Family able to verbalize concerns and demonstrate effective coping strategies  Description: INTERVENTIONS:  - Assist patient/family to identify coping skills,  available support systems and cultural and spiritual values  - Provide emotional support, including active listening and acknowledgement of concerns of patient and caregivers  - Reduce environmental stimuli, as able  - Provide patient education  - Assess for spiritual pain/suffering and initiate spiritual care, including notification of Pastoral Care or gila based community as needed  - Assess effectiveness of coping strategies  Outcome: Progressing  Goal: Will report anxiety at manageable levels  Description: INTERVENTIONS:  - Administer medication as ordered  - Teach and encourage coping skills  - Provide emotional support  - Assess patient/family for anxiety and ability to cope  Outcome: Progressing     Problem: DEATH & DYING  Goal: Pt/Family communicate acceptance of impending death and expresses psychological comfort and peace  Description: INTERVENTIONS:  - Assess patient/family anxiety and grief process related to end of life issues  - Provide emotional, spiritual and psychosocial support  - Provide information about the patient’s health status with consideration of family and cultural values  - Communicate willingness to discuss death and facilitate grief process  with patient/family as appropriate  - Emphasize sustaining relationships within family system and community, or gila/spiritual traditions  - Initiate Spiritual Care, Pastoral care or other ancillary consults as needed  - Refer to community support groups as appropriate  Outcome: Progressing     Problem: CHANGE IN BODY IMAGE  Goal: Pt/Family communicate acceptance of loss or change in body image and expresses psychological comfort and peace  Description: INTERVENTIONS:  - Assess patient/family anxiety and grief process related to change in body image, loss of functional status and loss of sense of self  - Assess patient/family's coping skills and provide emotional, spiritual and psychosocial support  - Provide information about the patient's  health status with consideration of family and cultural values  - Communicate willingness to discuss loss and facilitate grief process with patient/family as appropriate  - Emphasize sustaining relationships within family system and community, or gila/spiritual traditions  - Refer to community support groups as appropriate  - Initiate Spiritual Care, Pastoral care or other ancillary consults as needed  Outcome: Progressing     Problem: DECISION MAKING  Goal: Pt/Family able to effectively weigh alternatives and participate in decision making related to treatment and care  Description: INTERVENTIONS:  - Identify decision maker  - Determine when there are differences among patient's view, family's view, and healthcare provider's view of patient condition and care goals  - Facilitate patient/family articulation of goals for care  - Help patient/family identify pros/cons of alternative solutions  - Provide information as requested by patient/family  - Respect patient/family rights related to privacy and sharing information   - Serve as a liaison between patient, family and health care team  - Initiate consults as appropriate (Ethics Team, Palliative Care, Family Care Conference, etc.)  Outcome: Progressing     Problem: CONFUSION/THOUGHT DISTURBANCE  Goal: Thought disturbances (confusion, delirium, depression, dementia or psychosis) are managed to maintain or return to baseline mental status and functional level  Description: INTERVENTIONS:  - Assess for possible contributors to  thought disturbance, including but not limited to medications, infection, impaired vision or hearing, underlying metabolic abnormalities, dehydration, respiratory compromise,  psychiatric diagnoses and notify attending PHYSICAN/AP  - Monitor and intervene to maintain adequate nutrition, hydration, elimination, sleep and activity  - Decrease environmental stimuli, including noise as appropriate.  - Provide frequent contacts to provide refocusing,  direction and reassurance as needed. Approach patient calmly with eye contact and at their level.  - [place] high risk fall precautions, aspiration precautions and other safety measures, as indicated  - If delirium suspected, notify physician/AP of change in condition and request immediate in-person evaluation  - Pursue consults as appropriate including Geriatric (campus dependent), OT for cognitive evaluation/activity planning, psychiatric, pastoral care, etc.  Outcome: Progressing     Problem: BEHAVIOR  Goal: Pt/Family maintain appropriate behavior and adhere to behavioral management agreement, if implemented  Description: INTERVENTIONS:  - Assess the family dynamic   - Encourage verbalization of thoughts and concerns in a socially appropriate manner  - Assess patient/family's coping skills and non-compliant behavior (including use of illegal substances).  - Utilize positive, consistent limit setting strategies supporting safety of patient, staff and others  - Initiate consult with Case Management, Spiritual Care or other ancillary services as appropriate  - If a patient's/visitor's behavior jeopardizes the safety of the patient, staff, or others, refer to organization procedure.   - Notify Security of behavior or suspected illegal substances which indicate the need for search of the patient and/or belongings  - Encourage participation in the decision making process about a behavioral management agreement; implement if patient meets criteria  Outcome: Progressing     Problem: Depression - IP adult  Goal: Effects of depression will be minimized  Description: INTERVENTIONS:  - Assess impact of patient's symptoms on level of functioning, self-care needs and offer support as indicated  - Assess patient/family knowledge of depression, impact on illness and need for teaching  - Provide emotional support, presence and reassurance  - Assess for possible suicidal thoughts, ideation or self-harm. If patient expresses  suicidal thoughts or statements do not leave alone, notify physician/AP immediately, initiate Suicide Precautions, and determine need for continual observation.  - Initiate consults and referrals as appropriate (a mental health professional, Spiritual Care)  - Administer medication as ordered  Outcome: Progressing     Problem: SELF HARM  Goal: Effect of psychiatric condition will be minimized and patient will be protected from self harm  Description: INTERVENTIONS:  - Assess impact of patient's symptoms on level of functioning, self-care needs and offer support as indicated  - Assess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

## 2025-05-15 NOTE — CERTIFIED RECOVERY SPECIALIST
Certified  Note      Name: Pravin Oliveros 39 y.o. male I MRN: [id number]  Unit/Bed#: ICU 12 I Date of Admission: 5/14/2025   Date of Service: 5/15/2025 I Hospital Day: 1    Summary of Contact   Time spent with Patient 10    CRS engaged with patient to check in and offer support if desired.  CRS made introduction and  explained CRS services provided at hospital(Support, encouragement and resources if desired)     Patient reports he is doing well and nothing needed at this time, not receptive to conversation regarding OUD use and would reach out if needed     CRS educated patient about the disease of Alcohol/Substance Use Disorder, and its progression as well as the physical and me    Resources and contact information given     Follow up: NA       Administrative Statements  I have spent a total time of 15 minutes in caring for this patient on the day of the visit/encounter.    Traci Mendes

## 2025-05-15 NOTE — PLAN OF CARE
Problem: PAIN - ADULT  Goal: Verbalizes/displays adequate comfort level or baseline comfort level  Description: Interventions:  - Encourage patient to monitor pain and request assistance  - Assess pain using appropriate pain scale  - Administer analgesics as ordered based on type and severity of pain and evaluate response  - Implement non-pharmacological measures as appropriate and evaluate response  - Consider cultural and social influences on pain and pain management  - Notify physician/advanced practitioner if interventions unsuccessful or patient reports new pain  - Educate patient/family on pain management process including their role and importance of  reporting pain   - Provide non-pharmacologic/complimentary pain relief interventions  Outcome: Progressing     Problem: INFECTION - ADULT  Goal: Absence or prevention of progression during hospitalization  Description: INTERVENTIONS:  - Assess and monitor for signs and symptoms of infection  - Monitor lab/diagnostic results  - Monitor all insertion sites, i.e. indwelling lines, tubes, and drains  - Monitor endotracheal if appropriate and nasal secretions for changes in amount and color  - [place] appropriate cooling/warming therapies per order  - Administer medications as ordered  - Instruct and encourage patient and family to use good hand hygiene technique  - Identify and instruct in appropriate isolation precautions for identified infection/condition  Outcome: Progressing  Goal: Absence of fever/infection during neutropenic period  Description: INTERVENTIONS:  - Monitor WBC  - Perform strict hand hygiene  - Limit to healthy visitors only  - No plants, dried, fresh or silk flowers with cardenas in patient room  Outcome: Progressing     Problem: SAFETY ADULT  Goal: Patient will remain free of falls  Description: INTERVENTIONS:  - Educate patient/family on patient safety including physical limitations  - Instruct patient to call for assistance with activity   -  Consider consulting OT/PT to assist with strengthening/mobility based on AM PAC & JH-HLM score  - Consult OT/PT to assist with strengthening/mobility   - Keep Call bell within reach  - Keep bed low and locked with side rails adjusted as appropriate  - Keep care items and personal belongings within reach  - Initiate and maintain comfort rounds  - Make Fall Risk Sign visible to staff  - Offer Toileting every 2 Hours, in advance of need  - Initiate/Maintain bed alarm  - Obtain necessary fall risk management equipment:   - Apply yellow socks and bracelet for high fall risk patients  - Consider moving patient to room near nurses station  Outcome: Progressing  Goal: Maintain or return to baseline ADL function  Description: INTERVENTIONS:  -  Assess patient's ability to carry out ADLs; assess patient's baseline for ADL function and identify physical deficits which impact ability to perform ADLs (bathing, care of mouth/teeth, toileting, grooming, dressing, etc.)  - Assess/evaluate cause of self-care deficits   - Assess range of motion  - Assess patient's mobility; develop plan if impaired  - Assess patient's need for assistive devices and provide as appropriate  - Encourage maximum independence but intervene and supervise when necessary  - Involve family in performance of ADLs  - Assess for home care needs following discharge   - Consider OT consult to assist with ADL evaluation and planning for discharge  - Provide patient education as appropriate  - Monitor functional capacity and physical performance, use of AM PAC & JH-HLM   - Monitor gait, balance and fatigue with ambulation    Outcome: Progressing  Goal: Maintains/Returns to pre admission functional level  Description: INTERVENTIONS:  - Perform AM-PAC 6 Click Basic Mobility/ Daily Activity assessment daily.  - Set and communicate daily mobility goal to care team and patient/family/caregiver.   - Collaborate with rehabilitation services on mobility goals if  consulted  - Perform Range of Motion 3 times a day.  - Reposition patient every 2 hours.  - Dangle patient 3 times a day  - Stand patient 3 times a day  - Ambulate patient 3 times a day  - Out of bed to chair 3 times a day   - Out of bed for meals 3 times a day  - Out of bed for toileting  - Record patient progress and toleration of activity level   Outcome: Progressing     Problem: DISCHARGE PLANNING  Goal: Discharge to home or other facility with appropriate resources  Description: INTERVENTIONS:  - Identify barriers to discharge w/patient and caregiver  - Arrange for needed discharge resources and transportation as appropriate  - Identify discharge learning needs (meds, wound care, etc.)  - Arrange for interpretive services to assist at discharge as needed  - Refer to Case Management Department for coordinating discharge planning if the patient needs post-hospital services based on physician/advanced practitioner order or complex needs related to functional status, cognitive ability, or social support system  Outcome: Progressing     Problem: Knowledge Deficit  Goal: Patient/family/caregiver demonstrates understanding of disease process, treatment plan, medications, and discharge instructions  Description: Complete learning assessment and assess knowledge base.  Interventions:  - Provide teaching at level of understanding  - Provide teaching via preferred learning methods  Outcome: Progressing     Problem: Nutrition/Hydration-ADULT  Goal: Nutrient/Hydration intake appropriate for improving, restoring or maintaining nutritional needs  Description: Monitor and assess patient's nutrition/hydration status for malnutrition. Collaborate with interdisciplinary team and initiate plan and interventions as ordered.  Monitor patient's weight and dietary intake as ordered or per policy. Utilize nutrition screening tool and intervene as necessary. Determine patient's food preferences and provide high-protein, high-caloric  foods as appropriate.     INTERVENTIONS:  - Monitor oral intake, urinary output, labs, and treatment plans  - Assess nutrition and hydration status and recommend course of action  - Evaluate amount of meals eaten  - Assist patient with eating if necessary   - Allow adequate time for meals  - Recommend/ encourage appropriate diets, oral nutritional supplements, and vitamin/mineral supplements  - Order, calculate, and assess calorie counts as needed  - Recommend, monitor, and adjust tube feedings and TPN/PPN based on assessed needs  - Assess need for intravenous fluids  - Provide specific nutrition/hydration education as appropriate  - Include patient/family/caregiver in decisions related to nutrition  Outcome: Progressing     Problem: NEUROSENSORY - ADULT  Goal: Achieves stable or improved neurological status  Description: INTERVENTIONS  - Monitor and report changes in neurological status  - Monitor vital signs such as temperature, blood pressure, glucose, and any other labs ordered   - Initiate measures to prevent increased intracranial pressure  - Monitor for seizure activity and implement precautions if appropriate      Outcome: Progressing  Goal: Remains free of injury related to seizures activity  Description: INTERVENTIONS  - Maintain airway, patient safety  and administer oxygen as ordered  - Monitor patient for seizure activity, document and report duration and description of seizure to physician/advanced practitioner  - If seizure occurs,  ensure patient safety during seizure  - Reorient patient post seizure  - Seizure pads on all 4 side rails  - Instruct patient/family to notify RN of any seizure activity including if an aura is experienced  - Instruct patient/family to call for assistance with activity based on nursing assessment  - Administer anti-seizure medications if ordered    Outcome: Progressing  Goal: Achieves maximal functionality and self care  Description: INTERVENTIONS  - Monitor swallowing and  airway patency with patient fatigue and changes in neurological status  - Encourage and assist patient to increase activity and self care.   - Encourage visually impaired, hearing impaired and aphasic patients to use assistive/communication devices  Outcome: Progressing     Problem: CARDIOVASCULAR - ADULT  Goal: Maintains optimal cardiac output and hemodynamic stability  Description: INTERVENTIONS:  - Monitor I/O, vital signs and rhythm  - Monitor for S/S and trends of decreased cardiac output  - Administer and titrate ordered vasoactive medications to optimize hemodynamic stability  - Assess quality of pulses, skin color and temperature  - Assess for signs of decreased coronary artery perfusion  - Instruct patient to report change in severity of symptoms  Outcome: Progressing  Goal: Absence of cardiac dysrhythmias or at baseline rhythm  Description: INTERVENTIONS:  - Continuous cardiac monitoring, vital signs, obtain 12 lead EKG if ordered  - Administer antiarrhythmic and heart rate control medications as ordered  - Monitor electrolytes and administer replacement therapy as ordered  Outcome: Progressing     Problem: RESPIRATORY - ADULT  Goal: Achieves optimal ventilation and oxygenation  Description: INTERVENTIONS:  - Assess for changes in respiratory status  - Assess for changes in mentation and behavior  - Position to facilitate oxygenation and minimize respiratory effort  - Oxygen administered by appropriate delivery if ordered  - Initiate smoking cessation education as indicated  - Encourage broncho-pulmonary hygiene including cough, deep breathe, Incentive Spirometry  - Assess the need for suctioning and aspirate as needed  - Assess and instruct to report SOB or any respiratory difficulty  - Respiratory Therapy support as indicated  Outcome: Progressing     Problem: GASTROINTESTINAL - ADULT  Goal: Minimal or absence of nausea and/or vomiting  Description: INTERVENTIONS:  - Administer IV fluids if ordered to  ensure adequate hydration  - Maintain NPO status until nausea and vomiting are resolved  - Nasogastric tube if ordered  - Administer ordered antiemetic medications as needed  - Provide nonpharmacologic comfort measures as appropriate  - Advance diet as tolerated, if ordered  - Consider nutrition services referral to assist patient with adequate nutrition and appropriate food choices  Outcome: Progressing  Goal: Maintains or returns to baseline bowel function  Description: INTERVENTIONS:  - Assess bowel function  - Encourage oral fluids to ensure adequate hydration  - Administer IV fluids if ordered to ensure adequate hydration  - Administer ordered medications as needed  - Encourage mobilization and activity  - Consider nutritional services referral to assist patient with adequate nutrition and appropriate food choices  Outcome: Progressing  Goal: Maintains adequate nutritional intake  Description: INTERVENTIONS:  - Monitor percentage of each meal consumed  - Identify factors contributing to decreased intake, treat as appropriate  - Assist with meals as needed  - Monitor I&O, weight, and lab values if indicated  - Obtain nutrition services referral as needed  Outcome: Progressing  Goal: Establish and maintain optimal ostomy function  Description: INTERVENTIONS:  - Assess bowel function  - Encourage oral fluids to ensure adequate hydration  - Administer IV fluids if ordered to ensure adequate hydration   - Administer ordered medications as needed  - Encourage mobilization and activity  - Nutrition services referral to assist patient with appropriate food choices  - Assess stoma site  - Consider wound care consult   Outcome: Progressing  Goal: Oral mucous membranes remain intact  Description: INTERVENTIONS  - Assess oral mucosa and hygiene practices  - Implement preventative oral hygiene regimen  - Implement oral medicated treatments as ordered  - Initiate Nutrition services referral as needed  Outcome: Progressing      Problem: GENITOURINARY - ADULT  Goal: Maintains or returns to baseline urinary function  Description: INTERVENTIONS:  - Assess urinary function  - Encourage oral fluids to ensure adequate hydration if ordered  - Administer IV fluids as ordered to ensure adequate hydration  - Administer ordered medications as needed  - Offer frequent toileting  - Follow urinary retention protocol if ordered  Outcome: Progressing  Goal: Absence of urinary retention  Description: INTERVENTIONS:  - Assess patient’s ability to void and empty bladder  - Monitor I/O  - Bladder scan as needed  - Discuss with physician/AP medications to alleviate retention as needed  - Discuss catheterization for long term situations as appropriate  Outcome: Progressing  Goal: Urinary catheter remains patent  Description: INTERVENTIONS:  - Assess patency of urinary catheter  - If patient has a chronic miller, consider changing catheter if non-functioning  - Follow guidelines for intermittent irrigation of non-functioning urinary catheter  Outcome: Progressing     Problem: METABOLIC, FLUID AND ELECTROLYTES - ADULT  Goal: Electrolytes maintained within normal limits  Description: INTERVENTIONS:  - Monitor labs and assess patient for signs and symptoms of electrolyte imbalances  - Administer electrolyte replacement as ordered  - Monitor response to electrolyte replacements, including repeat lab results as appropriate  - Instruct patient on fluid and nutrition as appropriate  Outcome: Progressing  Goal: Fluid balance maintained  Description: INTERVENTIONS:  - Monitor labs   - Monitor I/O and WT  - Instruct patient on fluid and nutrition as appropriate  - Assess for signs & symptoms of volume excess or deficit  Outcome: Progressing  Goal: Glucose maintained within target range  Description: INTERVENTIONS:  - Monitor Blood Glucose as ordered  - Assess for signs and symptoms of hyperglycemia and hypoglycemia  - Administer ordered medications to maintain glucose  within target range  - Assess nutritional intake and initiate nutrition service referral as needed  Outcome: Progressing     Problem: SKIN/TISSUE INTEGRITY - ADULT  Goal: Skin Integrity remains intact(Skin Breakdown Prevention)  Description: Assess:  -Perform Magnus assessment   -Clean and moisturize skin  -Inspect skin when repositioning, toileting, and assisting with ADLS  -Assess under medical devices   -Assess extremities for adequate circulation and sensation     Bed Management:  -Have minimal linens on bed & keep smooth, unwrinkled  -Change linens as needed when moist or perspiring  -Keep HOB at 30 degrees       Activity:  -Mobilize patient 3 times a day  -Encourage activity and walks on unit  -Encourage or provide ROM exercises   -Use appropriate equipment to lift or move patient in bed      Skin Care:  -Avoid use of baby powder, tape, friction and shearing, hot water or constrictive clothing  -Relieve pressure over bony prominences -Do not massage red bony areas    Next Steps:  -Teach patient strategies to minimize risks   -Consider consults to  interdisciplinary teams Outcome: Progressing  Goal: Incision(s), wounds(s) or drain site(s) healing without S/S of infection  Description: INTERVENTIONS  - Assess and document dressing, incision, wound bed, drain sites and surrounding tissue  - Provide patient and family education  - Perform skin care/dressing changes Outcome: Progressing  Goal: Pressure injury heals and does not worsen  Description: Interventions:  - Implement low air loss mattress or specialty surface (Criteria met)  - Apply silicone foam dressing  - Turn and reposition patient & offload bony prominences every 2 hours   - Utilize friction reducing device or surface for transfers   - Consider consults to  interdisciplinary teams  - Use incontinent care products after each incontinent episode - Consider nutrition services referral as needed  Outcome: Progressing     Problem: HEMATOLOGIC -  ADULT  Goal: Maintains hematologic stability  Description: INTERVENTIONS  - Assess for signs and symptoms of bleeding or hemorrhage  - Monitor labs  - Administer supportive blood products/factors as ordered and appropriate  Outcome: Progressing     Problem: MUSCULOSKELETAL - ADULT  Goal: Maintain or return mobility to safest level of function  Description: INTERVENTIONS:  - Assess patient's ability to carry out ADLs; assess patient's baseline for ADL function and identify physical deficits which impact ability to perform ADLs (bathing, care of mouth/teeth, toileting, grooming, dressing, etc.)  - Assess/evaluate cause of self-care deficits   - Assess range of motion  - Assess patient's mobility  - Assess patient's need for assistive devices and provide as appropriate  - Encourage maximum independence but intervene and supervise when necessary  - Involve family in performance of ADLs  - Assess for home care needs following discharge   - Consider OT consult to assist with ADL evaluation and planning for discharge  - Provide patient education as appropriate  Outcome: Progressing  Goal: Maintain proper alignment of affected body part  Description: INTERVENTIONS:  - Support, maintain and protect limb and body alignment  - Provide patient/ family with appropriate education  Outcome: Progressing     Problem: COPING  Goal: Pt/Family able to verbalize concerns and demonstrate effective coping strategies  Description: INTERVENTIONS:  - Assist patient/family to identify coping skills, available support systems and cultural and spiritual values  - Provide emotional support, including active listening and acknowledgement of concerns of patient and caregivers  - Reduce environmental stimuli, as able  - Provide patient education  - Assess for spiritual pain/suffering and initiate spiritual care, including notification of Pastoral Care or gila based community as needed  - Assess effectiveness of coping strategies  Outcome:  Progressing  Goal: Will report anxiety at manageable levels  Description: INTERVENTIONS:  - Administer medication as ordered  - Teach and encourage coping skills  - Provide emotional support  - Assess patient/family for anxiety and ability to cope  Outcome: Progressing     Problem: DEATH & DYING  Goal: Pt/Family communicate acceptance of impending death and expresses psychological comfort and peace  Description: INTERVENTIONS:  - Assess patient/family anxiety and grief process related to end of life issues  - Provide emotional, spiritual and psychosocial support  - Provide information about the patient’s health status with consideration of family and cultural values  - Communicate willingness to discuss death and facilitate grief process  with patient/family as appropriate  - Emphasize sustaining relationships within family system and community, or gila/spiritual traditions  - Initiate Spiritual Care, Pastoral care or other ancillary consults as needed  - Refer to community support groups as appropriate  Outcome: Progressing     Problem: CHANGE IN BODY IMAGE  Goal: Pt/Family communicate acceptance of loss or change in body image and expresses psychological comfort and peace  Description: INTERVENTIONS:  - Assess patient/family anxiety and grief process related to change in body image, loss of functional status and loss of sense of self  - Assess patient/family's coping skills and provide emotional, spiritual and psychosocial support  - Provide information about the patient's health status with consideration of family and cultural values  - Communicate willingness to discuss loss and facilitate grief process with patient/family as appropriate  - Emphasize sustaining relationships within family system and community, or gila/spiritual traditions  - Refer to community support groups as appropriate  - Initiate Spiritual Care, Pastoral care or other ancillary consults as needed  Outcome: Progressing     Problem:  DECISION MAKING  Goal: Pt/Family able to effectively weigh alternatives and participate in decision making related to treatment and care  Description: INTERVENTIONS:  - Identify decision maker  - Determine when there are differences among patient's view, family's view, and healthcare provider's view of patient condition and care goals  - Facilitate patient/family articulation of goals for care  - Help patient/family identify pros/cons of alternative solutions  - Provide information as requested by patient/family  - Respect patient/family rights related to privacy and sharing information   - Serve as a liaison between patient, family and health care team  - Initiate consults as appropriate (Ethics Team, Palliative Care, Family Care Conference, etc.)  Outcome: Progressing     Problem: CONFUSION/THOUGHT DISTURBANCE  Goal: Thought disturbances (confusion, delirium, depression, dementia or psychosis) are managed to maintain or return to baseline mental status and functional level  Description: INTERVENTIONS:  - Assess for possible contributors to  thought disturbance, including but not limited to medications, infection, impaired vision or hearing, underlying metabolic abnormalities, dehydration, respiratory compromise,  psychiatric diagnoses and notify attending PHYSICAN/AP  - Monitor and intervene to maintain adequate nutrition, hydration, elimination, sleep and activity  - Decrease environmental stimuli, including noise as appropriate.  - Provide frequent contacts to provide refocusing, direction and reassurance as needed. Approach patient calmly with eye contact and at their level.  - [place] high risk fall precautions, aspiration precautions and other safety measures, as indicated  - If delirium suspected, notify physician/AP of change in condition and request immediate in-person evaluation  - Pursue consults as appropriate including Geriatric (campus dependent), OT for cognitive evaluation/activity planning,  psychiatric, pastoral care, etc.  Outcome: Progressing     Problem: BEHAVIOR  Goal: Pt/Family maintain appropriate behavior and adhere to behavioral management agreement, if implemented  Description: INTERVENTIONS:  - Assess the family dynamic   - Encourage verbalization of thoughts and concerns in a socially appropriate manner  - Assess patient/family's coping skills and non-compliant behavior (including use of illegal substances).  - Utilize positive, consistent limit setting strategies supporting safety of patient, staff and others  - Initiate consult with Case Management, Spiritual Care or other ancillary services as appropriate  - If a patient's/visitor's behavior jeopardizes the safety of the patient, staff, or others, refer to organization procedure.   - Notify Security of behavior or suspected illegal substances which indicate the need for search of the patient and/or belongings  - Encourage participation in the decision making process about a behavioral management agreement; implement if patient meets criteria  Outcome: Progressing     Problem: Depression - IP adult  Goal: Effects of depression will be minimized  Description: INTERVENTIONS:  - Assess impact of patient's symptoms on level of functioning, self-care needs and offer support as indicated  - Assess patient/family knowledge of depression, impact on illness and need for teaching  - Provide emotional support, presence and reassurance  - Assess for possible suicidal thoughts, ideation or self-harm. If patient expresses suicidal thoughts or statements do not leave alone, notify physician/AP immediately, initiate Suicide Precautions, and determine need for continual observation.  - Initiate consults and referrals as appropriate (a mental health professional, Spiritual Care)  - Administer medication as ordered  Outcome: Progressing     Problem: SELF HARM  Goal: Effect of psychiatric condition will be minimized and patient will be protected from self  harm  Description: INTERVENTIONS:  - Assess impact of patient's symptoms on level of functioning, self-care needs and offer support as indicated  - Assess patient/family knowledge of depression, impact on illness and need for teaching  - Provide emotional support, presence and reassurance  - Assess for possible suicidal thoughts, ideation or self-harm. If patient expresses suicidal thoughts or statements do not leave alone, notify physician/AP immediately, initiate suicide precautions, and determine need for continual observation.  - initiate consults and referrals as appropriate (a mental health professional,

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
## 2025-05-15 NOTE — UTILIZATION REVIEW
"Initial Clinical Review    Admission: Date/Time/Statement:   Admission Orders (From admission, onward)       Ordered        05/14/25 1663  Inpatient Admission  Once                          Orders Placed This Encounter   Procedures    Inpatient Admission     Standing Status:   Standing     Number of Occurrences:   1     Level of Care:   Level 1 Stepdown [13]     Estimated length of stay:   More than 2 Midnights     Certification:   I certify that inpatient services are medically necessary for this patient for a duration of greater than two midnights. See H&P and MD Progress Notes for additional information about the patient's course of treatment.     Initial Presentation: transferred from [facility]  31-year-old male with a history of polysubstance abuse. Patient reports he normally uses \"bags of fentanyl\". Patient presented to [place] ER with suspected overdose, lethargy and bradycardia. ED discussed case with toxicology with possible suspected xylazine and bradycardia at 35. They recommended transfer to higher level of care.   Transferred to [facility] & admitted to inpatient status for drug overdose. Plan: IVF, neuro checks.      Date: 5/15/25   Day 2:   Polysubstance drug overdose. Acute toxic encephalopathy which is now resolved. Pt d/c'd home today.    Scheduled Medications:  Medications 05/06 05/07 05/08 05/09 05/10 05/11 05/12 05/13 05/14 05/15   chlorhexidine (PERIDEX) 0.12 % oral rinse 15 mL  Dose: 15 mL  Freq: Every 12 hours scheduled Route: MT  Start: 05/15/25 0000   Admin Instructions:                0003     0850     2100        multi-electrolyte (Plasmalyte-A/Isolyte-S PH 7.4/Normosol-R) IV bolus 1,000 mL  Dose: 1,000 mL  Freq: Once Route: IV  Last Dose: Stopped (05/15/25 0136)  Start: 05/15/25 0000 End: 05/15/25 0136             0027     0136        naloxone (NARCAN) intranasal 2 mg  Dose: 2 mg  Freq: Once Route: NA  Start: 05/14/25 2000 End: 05/14/25 2004   Admin Instructions:    "            2004         naloxone (NARCAN) intranasal 2 mg  Dose: 2 mg  Freq: Once Route: NA  Start: 05/14/25 1845 End: 05/14/25 1843   Admin Instructions:               1843         naloxone (NARCAN) intranasal 2 mg  Dose: 2 mg  Freq: Once Route: NA  Start: 05/14/25 1830 End: 05/14/25 1822   Admin Instructions:               1822         sodium chloride 0.9 % bolus 1,000 mL  Dose: 1,000 mL  Freq: Once Route: IV  Last Dose: Stopped (05/14/25 2220)  Start: 05/14/25 2000 End: 05/14/25 2220 2003 2220         Legend:       [id number]/0705/0805/0905/1005/1105/1205/1305/1405/15        Continuous Meds Sorted by Name  for Pravin Oliveros as of 05/06/25 through 5/15/25  Legend:       Medications 05/06 05/07 05/08 05/09 05/10 05/11 05/12 05/13 05/14 05/15   multi-electrolyte (Plasmalyte-A/Isolyte-S PH 7.4/Normosol-R) IV solution  Rate: 125 mL/hr Dose: 125 mL/hr  Freq: Continuous Route: IV  Last Dose: Stopped (05/15/25 1018)  Start: 05/15/25 0000             0032     0729     0850     1018                      ED Triage Vitals   Temperature Pulse Respirations Blood Pressure SpO2 Pain Score   05/14/25 2352 05/14/25 2352 05/14/25 2352 05/14/25 2352 05/14/25 2352 05/15/25 0016   97.5 °F (36.4 °C) (!) 45 (!) 27 110/76 98 % No Pain     Weight (last 2 days)       Date/Time Weight    05/15/25 0531 77.9 (171.74)    05/15/25 0015 77.9 (171.74)            Vital Signs (last 3 days)       Date/Time Temp Pulse Resp BP MAP (mmHg) SpO2 Calculated FIO2 (%) - Nasal Cannula Nasal Cannula O2 Flow Rate (L/min) O2 Device Patient Position - Orthostatic VS Flavio Coma Scale Score Pain    05/15/25 0900 -- 52 19 117/77 84 97 % -- -- -- -- -- --    05/15/25 0855 -- -- -- -- -- -- -- -- -- -- 15 No Pain    05/15/25 0800 -- 40 13 117/78 95 97 % -- -- None (Room air) -- -- --    05/15/25 0700 97.4 °F (36.3 °C) 46 16 119/79 91 96 % -- -- -- -- 15 --    05/15/25 0600 -- 72 21 111/70 83 96 % -- -- -- -- -- --    05/15/25 0500 -- 44 15  "106/68 80 95 % -- -- -- -- 14 --    05/15/25 0400 98 °F (36.7 °C) 44 17 111/73 87 96 % -- -- None (Room air) Lying -- No Pain    05/15/25 0300 -- 44 18 117/76 90 96 % -- -- -- -- 14 --    05/15/25 0200 -- 40 18 115/64 86 99 % -- -- -- -- -- --    05/15/25 0100 97.5 °F (36.4 °C) 38 21 110/71 85 98 % 28 2 L/min Nasal cannula Lying 14 No Pain    05/15/25 0016 -- -- -- -- -- -- -- -- -- -- -- No Pain    05/15/25 0015 97.5 °F (36.4 °C) 45 27 110/76 -- -- -- -- -- -- -- --    05/14/25 2352 97.5 °F (36.4 °C) 45 27 110/76 -- 98 % -- -- -- -- -- --              Pertinent Labs/Diagnostic Test Results:   Radiology:  No orders to display     Cardiology:  No orders to display     GI:  No orders to display           Results from last 7 days   Lab Units 05/15/25  0424 05/15/25  0024 05/14/25  2003   WBC Thousand/uL 9.35 11.32* 14.26*   HEMOGLOBIN g/dL 11.9* 13.1 13.7   HEMATOCRIT % 34.3* 37.8 41.7   PLATELETS Thousands/uL 235 274 288   TOTAL NEUT ABS Thousands/µL 6.99 9.73* 11.67*         Results from last 7 days   Lab Units 05/15/25  0501 05/15/25  0116 05/15/25  0024 05/14/25 2003   SODIUM mmol/L 139 139  --  141   POTASSIUM mmol/L 4.2 4.2  --  3.8   CHLORIDE mmol/L 106 106  --  101   CO2 mmol/L 28 27  --  33*   ANION GAP mmol/L 5 6  --  7   BUN mg/dL 9 9  --  9   CREATININE mg/dL 0.67 0.69  --  0.93   EGFR ml/min/1.73sq m 121 120  --  103   CALCIUM mg/dL 8.4 8.4  --  9.2   CALCIUM, IONIZED mmol/L  --   --  1.13  --    MAGNESIUM mg/dL 2.1 2.1  --   --    PHOSPHORUS mg/dL 3.2 3.1  --   --      Results from last 7 days   Lab Units 05/15/25  0116 05/14/25 2003   AST U/L 23 27   ALT U/L 28 34   ALK PHOS U/L 44 53   TOTAL PROTEIN g/dL 5.1* 6.4   ALBUMIN g/dL 3.5 4.2   TOTAL BILIRUBIN mg/dL 1.02* 1.01*   BILIRUBIN DIRECT mg/dL 0.15 0.23*         Results from last 7 days   Lab Units 05/15/25  0501 05/15/25  0116 05/14/25 2003   GLUCOSE RANDOM mg/dL 100 103 115             No results found for: \"BETA-HYDROXYBUTYRATE\"       Results " from last 7 days   Lab Units 05/15/25  0116   PH JASBIR  7.388   PCO2 JASBIR mm Hg 44.2   PO2 JASBIR mm Hg 91.9*   HCO3 JASBIR mmol/L 26.0   BASE EXC JASBIR mmol/L 0.8   O2 CONTENT JASBIR ml/dL 17.5   O2 HGB, VENOUS % 95.8*                                 Results from last 7 days   Lab Units 05/15/25  0024   LACTIC ACID mmol/L 0.8                                                             Results from last 7 days   Lab Units 05/14/25 2026   AMPH/METH  Negative   BARBITURATE UR  Negative   BENZODIAZEPINE UR  Positive*   COCAINE UR  Positive*   METHADONE URINE  Negative   OPIATE UR  Negative   PCP UR  Negative   THC UR  Negative     Results from last 7 days   Lab Units 05/14/25 2047 05/14/25 2003   ETHANOL LVL mg/dL  --  <10   ACETAMINOPHEN LVL ug/mL  --  <2*   SALICYLATE LVL mg/dL <5*  --                                    Past Medical History:   Diagnosis Date    Anxiety     Cutaneous abscess of right knee 10/05/2020    Drug abuse (HCC)     Leukocytosis 12/22/2018    Substance abuse (HCC)      Present on Admission:  **None**      Admitting Diagnosis: Overdose [T50.901A]  Age/Sex: 39 y.o. male    Network Utilization Review Department  ATTENTION: Please call with any questions or concerns to [phone] and carefully listen to the prompts so that you are directed to the right person. All voicemails are confidential.   For Discharge needs, contact Care Management DC Support Team at [phone] opt. 2  Send all requests for admission clinical reviews, approved or denied determinations and any other requests to dedicated fax number below belonging to the campus where the patient is receiving treatment. List of dedicated fax numbers for the Facilities:  FACILITY NAME UR FAX NUMBER   ADMISSION DENIALS (Administrative/Medical Necessity) [phone]   DISCHARGE SUPPORT TEAM (NETWORK) [phone]   PARENT CHILD HEALTH (Maternity/NICU/Pediatrics) [phone]   [facility] [phone]   [facility]  [facility] [phone]   [facility] [phone]   [facility] [phone]   [facility] [phone]   [facility] [phone]   [facility] [phone]   [facility] [phone]   [facility] [phone]   [facility] [phone]   [facility] [phone]   [facility] [phone]

## 2025-05-15 NOTE — EMTALA/ACUTE CARE TRANSFER
[facility] EMERGENCY DEPARTMENT  [address] [facility] [address]  [phone]  Dept: [phone]      EMTALA TRANSFER CONSENT    NAME Pravin WHITNEY 1985                              MRN [id number]    I have been informed of my rights regarding examination, treatment, and transfer   by Dr. Sarbjit Rodrigues MD    Benefits: Specialized equipment and/or services available at the receiving facility (Include comment)________________________    Risks: Potential for delay in receiving treatment      Consent for Transfer:  I acknowledge that my medical condition has been evaluated and explained to me by the emergency department physician or other qualified medical person and/or my attending physician, who has recommended that I be transferred to the service of  Accepting Physician: Dr Chinmay tai  . The above potential benefits of such transfer, the potential risks associated with such transfer, and the probable risks of not being transferred have been explained to me, and I fully understand them.  The doctor has explained that, in my case, the benefits of transfer outweigh the risks.  I agree to be transferred.    I authorize the performance of emergency medical procedures and treatments upon me in both transit and upon arrival at the receiving facility.  Additionally, I authorize the release of any and all medical records to the receiving facility and request they be transported with me, if possible.  I understand that the safest mode of transportation during a medical emergency is an ambulance and that the Hospital advocates the use of this mode of transport. Risks of traveling to the receiving facility by car, including absence of medical control, life sustaining equipment, such as oxygen, and medical personnel has been explained to me and I fully understand them.    (SHELBY CORRECT BOX BELOW)  [  ]  I consent to the stated transfer and to be  transported by ambulance/helicopter.  [  ]  I consent to the stated transfer, but refuse transportation by ambulance and accept full responsibility for my transportation by car.  I understand the risks of non-ambulance transfers and I exonerate the Hospital and its staff from any deterioration in my condition that results from this refusal.    X___________________________________________    DATE  25  TIME________  Signature of patient or legally responsible individual signing on patient behalf           RELATIONSHIP TO PATIENT_________________________          Provider Certification    NAME Pravin Oliveros                                         1985                              MRN [id number]    A medical screening exam was performed on the above named patient.  Based on the examination:    Condition Necessitating Transfer The primary encounter diagnosis was Substance abuse (HCC). A diagnosis of Bradycardia was also pertinent to this visit.    Patient Condition: The patient has been stabilized such that within reasonable medical probability, no material deterioration of the patient condition or the condition of the unborn child(rachel) is likely to result from the transfer    Reason for Transfer: Level of Care needed not available at this facility    Transfer Requirements: Facility     Space available and qualified personnel available for treatment as acknowledged by    Agreed to accept transfer and to provide appropriate medical treatment as acknowledged by       Dr Moy  Appropriate medical records of the examination and treatment of the patient are provided at the time of transfer   STAFF INITIAL WHEN COMPLETED _______  Transfer will be performed by qualified personnel from    and appropriate transfer equipment as required, including the use of necessary and appropriate life support measures.    Provider Certification: I have examined the patient and explained the following risks and benefits of  being transferred/refusing transfer to the patient/family:  General risk, such as traffic hazards, adverse weather conditions, rough terrain or turbulence, possible failure of equipment (including vehicle or aircraft), or consequences of actions of persons outside the control of the transport personnel      Based on these reasonable risks and benefits to the patient and/or the unborn child(rachel), and based upon the information available at the time of the patient’s examination, I certify that the medical benefits reasonably to be expected from the provision of appropriate medical treatments at another medical facility outweigh the increasing risks, if any, to the individual’s medical condition, and in the case of labor to the unborn child, from effecting the transfer.    X____________________________________________ DATE 05/14/25        TIME_______      ORIGINAL - SEND TO MEDICAL RECORDS   COPY - SEND WITH PATIENT DURING TRANSFER

## 2025-05-15 NOTE — NURSING NOTE
Patient met with CRS. Resources given. Reviewed AVS with patient. Answered all patient's questions. Patient discharged to home along with all belongings including cell phone and wallet.

## 2025-05-15 NOTE — ED NOTES
Patient lethargic - awakens to name - answers orientation questions - follows simple commands. Monitor - sinus willis with stable vital signs and easy, adequate respirations.     Ida Nicole RN  05/14/25 [id number]

## 2025-05-15 NOTE — UTILIZATION REVIEW
NOTIFICATION OF INPATIENT ADMISSION   AUTHORIZATION REQUEST   SERVICING FACILITY:   [place] [address]  Tax ID: [id number]  NPI: [id number] ATTENDING PROVIDER:  Attending Name and NPI#: Gamal Alvarez Md [[id number]]  Address: [address] [place] [address]  Phone: [phone]     ADMISSION INFORMATION:  Place of Service: Inpatient [facility]  Place of Service Code: 21  Inpatient Admission Date/Time: 5/14/25 11:46 PM  Discharge Date/Time: 5/15/2025 11:45 AM  Admitting Diagnosis Code/Description:  Overdose [T50.901A]     UTILIZATION REVIEW CONTACT:  Ida Law, Utilization   Network Utilization Review Department  Phone: [phone]  Fax [phone]  Email: [email or web address]  Contact for approvals/pending authorizations, clinical reviews, and discharge.     PHYSICIAN ADVISORY SERVICES:  Medical Necessity Denial & Odbp-fh-Bcpb Review  Phone: [phone]  Fax: [phone]  Email: [email or web address]     DISCHARGE SUPPORT TEAM:  For Patients Discharge Needs & Updates  Phone: [phone] opt. 2 Fax: [phone]  Email: [email or web address]

## 2025-05-15 NOTE — H&P
"H&P - Critical Care/ICU   Name: Pravin Oliveros 39 y.o. male I MRN: [id number]  Unit/Bed#: ICU 12 I Date of Admission: 5/14/2025   Date of Service: 5/15/2025 I Hospital Day: 1       ----------------------------------------------------------------------------------------  HPI:    Pravin Oliveros is a 31-year-old male with a history of polysubstance abuse.  Patient reports he normally uses \"bags of fentanyl\".  Patient presented to the emergency department with suspected overdose, lethargy and bradycardia.    Patient's lab work relatively unremarkable but UDS positive for benzodiazepines, cocaine, fentanyl.    ED discussed case with toxicology with possible suspected xylazine and bradycardia at 35.  They recommended transfer to higher level of care.    Upon arrival to [facility], patient is alert and oriented, heart rate 43-57, sinus bradycardia, no hypotension.  Will continue with fluid resuscitation and symptomatic management.     ---------------------------------------------------------------------------------------    Assessment & Plan  Drug overdose  31-year-old male with a history of polysubstance abuse.  Patient reports he normally uses \"bags of fentanyl\".  Patient presented to the emergency department with suspected overdose, lethargy and bradycardia.  UDS positive for benzodiazepines, cocaine, fentanyl.  ED discussed case with toxicology with possible suspected xylazine and bradycardia at 35.  They recommended transfer to higher level of care.  Upon arrival to [facility], patient is alert and oriented, heart rate 43-57, sinus bradycardia, no hypotension    Plan  Continue fluid resuscitation  Consider toxicology consult  Continue cardiac monitoring    Disposition: Stepdown Level 1    History of Present Illness   See above     History obtained from chart review and the patient.  Review of Systems: Review of Systems   Respiratory:  Negative for chest tightness and shortness of breath.  "   Cardiovascular:  Negative for chest pain.   Gastrointestinal:  Negative for abdominal pain, diarrhea, nausea and vomiting.   Neurological:  Negative for dizziness.       Historical Information   Past Medical History:  No date: Anxiety  10/05/2020: Cutaneous abscess of right knee  No date: Drug abuse ([facility])  12/22/2018: Leukocytosis  No date: Substance abuse ([facility]) No past surgical history on file.   Current Outpatient Medications   Medication Instructions    buprenorphine-naloxone (Suboxone) 8-2 mg TAKE 1 FILM SUBLINGUALLY EVERY DAY    hydrOXYzine pamoate (VISTARIL) 25 mg capsule     Narcan 4 MG/0.1ML nasal spray 1 spray    Allergies[1]   Social History[2] Family History   Problem Relation Age of Onset    Diabetes Brother     Heart disease Maternal Grandmother           Objective :                   Vitals I/O      Most Recent Min/Max in 24hrs   Temp   Temp  Min: 97.5 °F (36.4 °C)  Max: 97.5 °F (36.4 °C)   Pulse   Pulse  Min: 35  Max: 58   Resp   Resp  Min: 12  Max: 16   BP   BP  Min: 88/33  Max: 139/98   O2 Sat   SpO2  Min: 95 %  Max: 99 %    No intake or output data in the 24 hours ending 05/15/25 0007    Diet NPO    Invasive Monitoring           Physical Exam   Physical Exam  Vitals reviewed.   Eyes:      General: Gaze aligned appropriately.   Skin:     General: Skin is warm.   HENT:      Head: Normocephalic and atraumatic.   Cardiovascular:      Rate and Rhythm: Regular rhythm. Bradycardia present.   Musculoskeletal:      Cervical back: Normal range of motion.   Constitutional:       General: He is awake. He is in acute distress.      Appearance: He is not ill-appearing.   Pulmonary:      Effort: Pulmonary effort is normal.      Breath sounds: Normal breath sounds.   Neurological:      General: No focal deficit present.      Mental Status: He is alert.      GCS: GCS eye subscore is 4. GCS verbal subscore is 5. GCS motor subscore is 6.          Diagnostic Studies        Lab Results: I have reviewed the following  "results:     Medications:  Scheduled PRN   chlorhexidine, 15 mL, Q12H [place]  multi-electrolyte, 1,000 mL, Once          Continuous    multi-electrolyte, 125 mL/hr         Labs:   CBC    Recent Labs     05/14/25 2003   WBC 14.26*   HGB 13.7   HCT 41.7        BMP    Recent Labs     05/14/25 2003   SODIUM 141   K 3.8      CO2 33*   AGAP 7   BUN 9   CREATININE 0.93   CALCIUM 9.2       Coags    No recent results     Additional Electrolytes  No recent results       Blood Gas    No recent results  No recent results LFTs  Recent Labs     05/14/25 2003   ALT 34   AST 27   ALKPHOS 53   ALB 4.2   TBILI 1.01*       Infectious  No recent results  Glucose  Recent Labs     05/14/25 2003   GLUC 115               [1]   Allergies  Allergen Reactions    Penicillins    [2]   Social History  Tobacco Use    Smoking status: Every Day     Current packs/day: 0.25     Types: Cigarettes    Smokeless tobacco: Never    Tobacco comments:     occationally   Vaping Use    Vaping status: Never Used   Substance Use Topics    Alcohol use: Not Currently     Comment: social    Drug use: Yes     Types: Methamphetamines, Fentanyl, \"Crack\" cocaine     "

## 2025-05-15 NOTE — ED NOTES
Pt continues to roll over in bed and pull of monitoring devices.      Crissy Adams RN  05/14/25 6987

## 2025-05-15 NOTE — ED NOTES
Report called to/care transferred to [facility]     Crissy Adams RN  05/14/25 2643

## 2025-05-15 NOTE — CASE MANAGEMENT
Case Management Assessment & Discharge Planning Note    Patient name Pravin Oliveros  Location ICU 12/ICU 12 MRN [id number]  : 1985 Date 5/15/2025       Current Admission Date: 2025  Current Admission Diagnosis:Drug overdose   Patient Active Problem List    Diagnosis Date Noted    Drug overdose 05/15/2025    Benzodiazepine withdrawal with complication (HCC) 2024    Polysubstance abuse (HCC) 2024    Opioid withdrawal (HCC) 2022    Opioid use disorder, moderate, dependence (HCC) 2022    Tobacco dependence 2018    Moderate benzodiazepine use disorder (HCC) 2018    Anxiety 2018    Polysubstance dependence including opioid type drug with complication, continuous use (HCC) 2018      LOS (days): 1  Geometric Mean LOS (GMLOS) (days):   Days to GMLOS:     OBJECTIVE:    Risk of Unplanned Readmission Score: 15.65         Current admission status: Inpatient       Preferred Pharmacy:   [id number] [place] [place] [address] [place] [address]  Phone: [phone] Fax: [phone]    [id number] [place], [place] - [address]  [address] [address]  Phone: [phone] Fax: [phone]    [id number] - [place], [place] - [address]  [address] [place] [address]  Phone: [phone] Fax: [phone]    Primary Care Provider: Pk Jolly DO    Primary Insurance: COMMERCIAL MISCELLANEOUS  Secondary Insurance:     ASSESSMENT:  Active Health Care Proxies       Myrna Stevens [facility] Care Representative - Mother   Primary Phone: [phone] (Home)                 Advance Directives  Does patient have a Health Care POA?: No  Was patient offered paperwork?: No  Does patient currently have a Health Care decision maker?: Yes, please see Health Care Proxy section  Does patient have Advance Directives?: No  Was patient offered paperwork?:  No  Primary Contact: Myrna Stevens (mother) [phone]    Patient Information  Admitted from:: Facility ( ED)  Mental Status: Alert  During Assessment patient was accompanied by: Not accompanied during assessment  Assessment information provided by:: Patient  Primary Caregiver: Self  County of Residence: [place]  What city do you live in?: [place]  Home entry access options. Select all that apply.: Stairs  Number of steps to enter home.: One Flight  Do the steps have railings?: Yes  Type of Current Residence: Apartment  Floor Level: 2  Upon entering residence, is there a bedroom on the main floor (no further steps)?: Yes  Upon entering residence, is there a bathroom on the main floor (no further steps)?: Yes  Living Arrangements: Lives w/ Spouse/significant other  Is patient a ?: No    Activities of Daily Living Prior to Admission  Functional Status: Independent  Completes ADLs independently?: Yes  Ambulates independently?: Yes  Does patient use assisted devices?: No  Does patient currently own DME?: No  Does patient have a history of Outpatient Therapy (PT/OT)?: No  Does the patient have a history of Short-Term Rehab?: No  Does patient have a history of HHC?: No  Does patient currently have HHC?: No       Patient Information Continued  Income Source: Unemployed  Does patient have prescription coverage?: Yes  Can the patient afford their medications and any related supplies (such as glucometers or test strips)?: N/A  Does patient receive dialysis treatments?: No  Does patient have a history of substance abuse?: Yes  Historical substance use preference: Heroin, Benzodiazepines, Cocaine, Methamphetamines (Xylazine)  History of Withdrawal Symptoms: Delirium tremors  Is patient currently in treatment for substance abuse?: No. Patient declined treatment information.  Does patient have a history of Mental Health Diagnosis?: Yes (anxiety, depression, PTSD)  Is patient receiving treatment for mental health?:  No. Patient declined treatment information.  Has patient received inpatient treatment related to mental health in the last 2 years?: No       Means of Transportation  Means of Transport to Appts:: Drives Self    DISCHARGE DETAILS:    Discharge planning discussed with:: patient  Freedom of Choice: Yes  Comments - Freedom of Choice: CM offered HOST and CRS.   Patient agreed to the referrals.  Patient left prior to HOST referral being placed and did not wait for host referral.  EDDIE Wu was able to see the patient prior to him leaving.  CM contacted family/caregiver?: No- see comments  Were Treatment Team discharge recommendations reviewed with patient/caregiver?: Yes  Did patient/caregiver verbalize understanding of patient care needs?: Yes  Were patient/caregiver advised of the risks associated with not following Treatment Team discharge recommendations?: Yes    Contacts  Patient Contacts: Myrna Stevens (mother) [phone]  Relationship to Patient:: Family    Requested Home Health Care         Is the patient interested in HHC at discharge?: No    DME Referral Provided  Referral made for DME?: No    Other Referral/Resources/Interventions Provided:  Interventions: Substance Abuse Treatment  Referral Comments: patient left the hospital prior to HOST referral       Treatment Team Recommendation: Home  Discharge Destination Plan:: Home         Additional Comments: CM met with the patient at the bedside for intake assessment and discharge planning. CM introduced self and reviewed role. CM reviewed the HOST and CRS programs at which time the patient agrees to the referral. Patient later left AMA before the referrals can be placed. EDDIE Wu was able to see the patient prior to him leaving.

## 2025-05-16 ENCOUNTER — TRANSITIONAL CARE MANAGEMENT (OUTPATIENT)
Dept: FAMILY MEDICINE CLINIC | Facility: CLINIC | Age: 40
End: 2025-05-16

## 2025-05-16 DIAGNOSIS — Z59.41 FOOD INSECURITY: Primary | ICD-10-CM

## 2025-05-16 DIAGNOSIS — Z59.819 HOUSING INSECURITY: ICD-10-CM

## 2025-05-16 SDOH — ECONOMIC STABILITY - HOUSING INSECURITY: HOUSING INSTABILITY UNSPECIFIED: Z59.819

## 2025-05-16 SDOH — ECONOMIC STABILITY - FOOD INSECURITY: FOOD INSECURITY: Z59.41

## 2025-05-21 ENCOUNTER — PATIENT OUTREACH (OUTPATIENT)
Dept: CASE MANAGEMENT | Facility: OTHER | Age: 40
End: 2025-05-21

## 2025-05-21 NOTE — PROGRESS NOTES
This writer was covering for JOSE MANSFIELD while she was out of the office. Patient was referred due to food and housing insecurities. JOSE TAM attempted outreach to number listed in the chart. Phone number rang but was unable to leave a voicemail at this time. Update to Jurgen regarding same.     JOSE TAM to attempt additional outreach

## 2025-05-28 ENCOUNTER — PATIENT OUTREACH (OUTPATIENT)
Dept: CASE MANAGEMENT | Facility: OTHER | Age: 40
End: 2025-05-28

## 2025-05-28 NOTE — LETTER
[address] [address]  [phone]    Re: Care Management   5/28/2025       Dear Pravin,    I have been asked to contact you to offer you Psychosocial support.  I have tried to contact you on two occasions however I have been unable to reach you.  Please feel free to contact me at #[phone]. Thank you.     Sincerely,         Anna Richards MSW

## 2025-05-28 NOTE — PROGRESS NOTES
OP SWCM has not received return call from patient to date.  Chart reviewed and second call placed to patient today to offer support regarding food and housing insecurity and message left. Unable to Reach letter has been sent to patient via My Chart.  Will close referral and remain available to provide support.